# Patient Record
Sex: MALE | Race: BLACK OR AFRICAN AMERICAN | NOT HISPANIC OR LATINO | Employment: UNEMPLOYED
[De-identification: names, ages, dates, MRNs, and addresses within clinical notes are randomized per-mention and may not be internally consistent; named-entity substitution may affect disease eponyms.]

---

## 2017-05-04 ENCOUNTER — RECORDS - HEALTHEAST (OUTPATIENT)
Dept: ADMINISTRATIVE | Facility: OTHER | Age: 57
End: 2017-05-04

## 2017-12-07 ENCOUNTER — OFFICE VISIT - HEALTHEAST (OUTPATIENT)
Dept: INTERNAL MEDICINE | Facility: CLINIC | Age: 57
End: 2017-12-07

## 2017-12-07 DIAGNOSIS — F19.10 POLYSUBSTANCE ABUSE (H): ICD-10-CM

## 2017-12-07 DIAGNOSIS — M25.562 LEFT KNEE PAIN: ICD-10-CM

## 2017-12-07 DIAGNOSIS — I10 ESSENTIAL HYPERTENSION: ICD-10-CM

## 2017-12-07 DIAGNOSIS — F32.9 MAJOR DEPRESSION: ICD-10-CM

## 2017-12-07 ASSESSMENT — MIFFLIN-ST. JEOR: SCORE: 1567.43

## 2018-01-04 ENCOUNTER — COMMUNICATION - HEALTHEAST (OUTPATIENT)
Dept: INTERNAL MEDICINE | Facility: CLINIC | Age: 58
End: 2018-01-04

## 2018-01-09 ENCOUNTER — RECORDS - HEALTHEAST (OUTPATIENT)
Dept: ADMINISTRATIVE | Facility: OTHER | Age: 58
End: 2018-01-09

## 2018-01-15 ENCOUNTER — RECORDS - HEALTHEAST (OUTPATIENT)
Dept: ADMINISTRATIVE | Facility: OTHER | Age: 58
End: 2018-01-15

## 2018-01-18 ENCOUNTER — OFFICE VISIT - HEALTHEAST (OUTPATIENT)
Dept: INTERNAL MEDICINE | Facility: CLINIC | Age: 58
End: 2018-01-18

## 2018-01-18 DIAGNOSIS — I10 ESSENTIAL HYPERTENSION: ICD-10-CM

## 2018-01-18 DIAGNOSIS — M54.9 BACK PAIN: ICD-10-CM

## 2018-01-18 DIAGNOSIS — M25.562 LEFT KNEE PAIN: ICD-10-CM

## 2018-01-18 DIAGNOSIS — F32.9 MAJOR DEPRESSION: ICD-10-CM

## 2018-01-18 LAB
ALBUMIN SERPL-MCNC: 3.8 G/DL (ref 3.5–5)
ALP SERPL-CCNC: 67 U/L (ref 45–120)
ALT SERPL W P-5'-P-CCNC: 18 U/L (ref 0–45)
ANION GAP SERPL CALCULATED.3IONS-SCNC: 12 MMOL/L (ref 5–18)
AST SERPL W P-5'-P-CCNC: 24 U/L (ref 0–40)
BILIRUB SERPL-MCNC: 0.5 MG/DL (ref 0–1)
BUN SERPL-MCNC: 15 MG/DL (ref 8–22)
CALCIUM SERPL-MCNC: 9.8 MG/DL (ref 8.5–10.5)
CHLORIDE BLD-SCNC: 95 MMOL/L (ref 98–107)
CO2 SERPL-SCNC: 29 MMOL/L (ref 22–31)
CREAT SERPL-MCNC: 1.03 MG/DL (ref 0.7–1.3)
GFR SERPL CREATININE-BSD FRML MDRD: >60 ML/MIN/1.73M2
GLUCOSE BLD-MCNC: 110 MG/DL (ref 70–125)
POTASSIUM BLD-SCNC: 4 MMOL/L (ref 3.5–5)
PROT SERPL-MCNC: 7.7 G/DL (ref 6–8)
SODIUM SERPL-SCNC: 136 MMOL/L (ref 136–145)

## 2018-01-18 ASSESSMENT — MIFFLIN-ST. JEOR: SCORE: 1612.79

## 2018-01-22 ENCOUNTER — COMMUNICATION - HEALTHEAST (OUTPATIENT)
Dept: INTERNAL MEDICINE | Facility: CLINIC | Age: 58
End: 2018-01-22

## 2018-01-23 ENCOUNTER — COMMUNICATION - HEALTHEAST (OUTPATIENT)
Dept: INTERNAL MEDICINE | Facility: CLINIC | Age: 58
End: 2018-01-23

## 2018-01-29 ENCOUNTER — RECORDS - HEALTHEAST (OUTPATIENT)
Dept: ADMINISTRATIVE | Facility: OTHER | Age: 58
End: 2018-01-29

## 2018-01-30 ENCOUNTER — RECORDS - HEALTHEAST (OUTPATIENT)
Dept: ADMINISTRATIVE | Facility: OTHER | Age: 58
End: 2018-01-30

## 2018-01-30 ENCOUNTER — OFFICE VISIT - HEALTHEAST (OUTPATIENT)
Dept: BEHAVIORAL HEALTH | Facility: CLINIC | Age: 58
End: 2018-01-30

## 2018-01-30 DIAGNOSIS — F32.1 MODERATE SINGLE CURRENT EPISODE OF MAJOR DEPRESSIVE DISORDER (H): ICD-10-CM

## 2018-02-20 ENCOUNTER — COMMUNICATION - HEALTHEAST (OUTPATIENT)
Dept: INTERNAL MEDICINE | Facility: CLINIC | Age: 58
End: 2018-02-20

## 2018-02-23 ENCOUNTER — RECORDS - HEALTHEAST (OUTPATIENT)
Dept: ADMINISTRATIVE | Facility: OTHER | Age: 58
End: 2018-02-23

## 2018-03-05 ENCOUNTER — OFFICE VISIT - HEALTHEAST (OUTPATIENT)
Dept: BEHAVIORAL HEALTH | Facility: CLINIC | Age: 58
End: 2018-03-05

## 2018-03-05 ENCOUNTER — COMMUNICATION - HEALTHEAST (OUTPATIENT)
Dept: BEHAVIORAL HEALTH | Facility: CLINIC | Age: 58
End: 2018-03-05

## 2018-03-05 DIAGNOSIS — F41.1 GENERALIZED ANXIETY DISORDER: ICD-10-CM

## 2018-03-05 DIAGNOSIS — F12.21 CANNABIS DEPENDENCE IN REMISSION (H): ICD-10-CM

## 2018-03-05 DIAGNOSIS — F17.200 TOBACCO USE DISORDER: ICD-10-CM

## 2018-03-05 DIAGNOSIS — F32.1 MODERATE SINGLE CURRENT EPISODE OF MAJOR DEPRESSIVE DISORDER (H): ICD-10-CM

## 2018-03-05 DIAGNOSIS — F10.21 ALCOHOL USE DISORDER, SEVERE, IN EARLY REMISSION (H): ICD-10-CM

## 2018-03-05 DIAGNOSIS — F11.20: ICD-10-CM

## 2018-03-27 ENCOUNTER — OFFICE VISIT - HEALTHEAST (OUTPATIENT)
Dept: BEHAVIORAL HEALTH | Facility: CLINIC | Age: 58
End: 2018-03-27

## 2018-03-27 ENCOUNTER — AMBULATORY - HEALTHEAST (OUTPATIENT)
Dept: BEHAVIORAL HEALTH | Facility: CLINIC | Age: 58
End: 2018-03-27

## 2018-03-27 DIAGNOSIS — F10.21 ALCOHOL USE DISORDER, SEVERE, IN EARLY REMISSION (H): ICD-10-CM

## 2018-03-27 DIAGNOSIS — F11.20: ICD-10-CM

## 2018-03-27 DIAGNOSIS — F12.21 CANNABIS DEPENDENCE IN REMISSION (H): ICD-10-CM

## 2018-03-27 DIAGNOSIS — F41.1 GENERALIZED ANXIETY DISORDER: ICD-10-CM

## 2018-03-27 DIAGNOSIS — F32.1 MODERATE SINGLE CURRENT EPISODE OF MAJOR DEPRESSIVE DISORDER (H): ICD-10-CM

## 2018-03-27 DIAGNOSIS — F17.200 TOBACCO USE DISORDER: ICD-10-CM

## 2018-04-02 ENCOUNTER — RECORDS - HEALTHEAST (OUTPATIENT)
Dept: ADMINISTRATIVE | Facility: OTHER | Age: 58
End: 2018-04-02

## 2018-04-03 ENCOUNTER — OFFICE VISIT - HEALTHEAST (OUTPATIENT)
Dept: BEHAVIORAL HEALTH | Facility: CLINIC | Age: 58
End: 2018-04-03

## 2018-04-03 DIAGNOSIS — F17.200 TOBACCO USE DISORDER: ICD-10-CM

## 2018-04-03 DIAGNOSIS — F11.20: ICD-10-CM

## 2018-04-03 DIAGNOSIS — F32.1 MODERATE SINGLE CURRENT EPISODE OF MAJOR DEPRESSIVE DISORDER (H): ICD-10-CM

## 2018-04-03 DIAGNOSIS — F10.21 ALCOHOL USE DISORDER, SEVERE, IN EARLY REMISSION (H): ICD-10-CM

## 2018-04-03 DIAGNOSIS — F41.1 GENERALIZED ANXIETY DISORDER: ICD-10-CM

## 2018-04-06 ENCOUNTER — RECORDS - HEALTHEAST (OUTPATIENT)
Dept: ADMINISTRATIVE | Facility: OTHER | Age: 58
End: 2018-04-06

## 2018-04-08 ENCOUNTER — COMMUNICATION - HEALTHEAST (OUTPATIENT)
Dept: SCHEDULING | Facility: CLINIC | Age: 58
End: 2018-04-08

## 2018-04-16 ENCOUNTER — RECORDS - HEALTHEAST (OUTPATIENT)
Dept: ADMINISTRATIVE | Facility: OTHER | Age: 58
End: 2018-04-16

## 2018-04-17 ENCOUNTER — COMMUNICATION - HEALTHEAST (OUTPATIENT)
Dept: BEHAVIORAL HEALTH | Facility: CLINIC | Age: 58
End: 2018-04-17

## 2018-04-26 ENCOUNTER — OFFICE VISIT - HEALTHEAST (OUTPATIENT)
Dept: INTERNAL MEDICINE | Facility: CLINIC | Age: 58
End: 2018-04-26

## 2018-04-26 DIAGNOSIS — E66.01 MORBID OBESITY (H): ICD-10-CM

## 2018-04-26 DIAGNOSIS — I10 ESSENTIAL HYPERTENSION: ICD-10-CM

## 2018-04-26 DIAGNOSIS — G89.29 CHRONIC BACK PAIN: ICD-10-CM

## 2018-04-26 DIAGNOSIS — F32.9 MAJOR DEPRESSION: ICD-10-CM

## 2018-04-26 DIAGNOSIS — M54.9 CHRONIC BACK PAIN: ICD-10-CM

## 2018-04-26 ASSESSMENT — MIFFLIN-ST. JEOR: SCORE: 1701.24

## 2018-05-01 ENCOUNTER — COMMUNICATION - HEALTHEAST (OUTPATIENT)
Dept: BEHAVIORAL HEALTH | Facility: CLINIC | Age: 58
End: 2018-05-01

## 2018-05-01 ENCOUNTER — AMBULATORY - HEALTHEAST (OUTPATIENT)
Dept: BEHAVIORAL HEALTH | Facility: CLINIC | Age: 58
End: 2018-05-01

## 2018-05-07 ENCOUNTER — RECORDS - HEALTHEAST (OUTPATIENT)
Dept: ADMINISTRATIVE | Facility: OTHER | Age: 58
End: 2018-05-07

## 2018-05-08 ENCOUNTER — OFFICE VISIT - HEALTHEAST (OUTPATIENT)
Dept: BEHAVIORAL HEALTH | Facility: CLINIC | Age: 58
End: 2018-05-08

## 2018-05-08 DIAGNOSIS — F10.21 ALCOHOL USE DISORDER, SEVERE, IN EARLY REMISSION (H): ICD-10-CM

## 2018-05-08 DIAGNOSIS — F32.1 MODERATE SINGLE CURRENT EPISODE OF MAJOR DEPRESSIVE DISORDER (H): ICD-10-CM

## 2018-05-08 DIAGNOSIS — F17.200 TOBACCO USE DISORDER: ICD-10-CM

## 2018-05-08 DIAGNOSIS — F12.21 CANNABIS DEPENDENCE IN REMISSION (H): ICD-10-CM

## 2018-05-08 DIAGNOSIS — F11.20: ICD-10-CM

## 2018-05-08 DIAGNOSIS — F41.1 GENERALIZED ANXIETY DISORDER: ICD-10-CM

## 2018-05-10 ENCOUNTER — OFFICE VISIT - HEALTHEAST (OUTPATIENT)
Dept: BEHAVIORAL HEALTH | Facility: CLINIC | Age: 58
End: 2018-05-10

## 2018-05-10 ENCOUNTER — COMMUNICATION - HEALTHEAST (OUTPATIENT)
Dept: SURGERY | Facility: CLINIC | Age: 58
End: 2018-05-10

## 2018-05-10 DIAGNOSIS — F06.31 DEPRESSION DUE TO PHYSICAL ILLNESS: ICD-10-CM

## 2018-05-10 DIAGNOSIS — F32.9 REACTIVE DEPRESSION: ICD-10-CM

## 2018-05-10 ASSESSMENT — MIFFLIN-ST. JEOR: SCORE: 1660.41

## 2018-05-15 ENCOUNTER — OFFICE VISIT - HEALTHEAST (OUTPATIENT)
Dept: BEHAVIORAL HEALTH | Facility: CLINIC | Age: 58
End: 2018-05-15

## 2018-05-15 DIAGNOSIS — F10.21 ALCOHOL USE DISORDER, SEVERE, IN EARLY REMISSION (H): ICD-10-CM

## 2018-05-15 DIAGNOSIS — F11.20: ICD-10-CM

## 2018-05-15 DIAGNOSIS — F17.200 TOBACCO USE DISORDER: ICD-10-CM

## 2018-05-15 DIAGNOSIS — F12.21 CANNABIS DEPENDENCE IN REMISSION (H): ICD-10-CM

## 2018-05-15 DIAGNOSIS — F32.1 MODERATE SINGLE CURRENT EPISODE OF MAJOR DEPRESSIVE DISORDER (H): ICD-10-CM

## 2018-05-15 DIAGNOSIS — F41.1 GENERALIZED ANXIETY DISORDER: ICD-10-CM

## 2018-05-22 ENCOUNTER — OFFICE VISIT - HEALTHEAST (OUTPATIENT)
Dept: BEHAVIORAL HEALTH | Facility: CLINIC | Age: 58
End: 2018-05-22

## 2018-05-22 DIAGNOSIS — F11.20: ICD-10-CM

## 2018-05-22 DIAGNOSIS — F12.21 CANNABIS DEPENDENCE IN REMISSION (H): ICD-10-CM

## 2018-05-22 DIAGNOSIS — F32.1 MODERATE SINGLE CURRENT EPISODE OF MAJOR DEPRESSIVE DISORDER (H): ICD-10-CM

## 2018-05-22 DIAGNOSIS — F41.1 GENERALIZED ANXIETY DISORDER: ICD-10-CM

## 2018-05-22 DIAGNOSIS — F10.21 ALCOHOL USE DISORDER, SEVERE, IN EARLY REMISSION (H): ICD-10-CM

## 2018-05-22 DIAGNOSIS — F17.200 TOBACCO USE DISORDER: ICD-10-CM

## 2018-05-24 ENCOUNTER — COMMUNICATION - HEALTHEAST (OUTPATIENT)
Dept: INTERNAL MEDICINE | Facility: CLINIC | Age: 58
End: 2018-05-24

## 2018-05-24 DIAGNOSIS — M54.9 CHRONIC BACK PAIN: ICD-10-CM

## 2018-05-24 DIAGNOSIS — G89.29 CHRONIC BACK PAIN: ICD-10-CM

## 2018-05-29 ENCOUNTER — OFFICE VISIT - HEALTHEAST (OUTPATIENT)
Dept: BEHAVIORAL HEALTH | Facility: CLINIC | Age: 58
End: 2018-05-29

## 2018-05-29 DIAGNOSIS — F32.1 MODERATE SINGLE CURRENT EPISODE OF MAJOR DEPRESSIVE DISORDER (H): ICD-10-CM

## 2018-05-29 DIAGNOSIS — F41.1 GENERALIZED ANXIETY DISORDER: ICD-10-CM

## 2018-05-29 DIAGNOSIS — F17.200 TOBACCO USE DISORDER: ICD-10-CM

## 2018-06-05 ENCOUNTER — RECORDS - HEALTHEAST (OUTPATIENT)
Dept: ADMINISTRATIVE | Facility: OTHER | Age: 58
End: 2018-06-05

## 2018-06-06 ENCOUNTER — RECORDS - HEALTHEAST (OUTPATIENT)
Dept: GENERAL RADIOLOGY | Facility: CLINIC | Age: 58
End: 2018-06-06

## 2018-06-06 DIAGNOSIS — R76.11 NONSPECIFIC REACTION TO TUBERCULIN SKIN TEST WITHOUT ACTIVE TUBERCULOSIS: ICD-10-CM

## 2018-06-11 ENCOUNTER — OFFICE VISIT - HEALTHEAST (OUTPATIENT)
Dept: BEHAVIORAL HEALTH | Facility: CLINIC | Age: 58
End: 2018-06-11

## 2018-06-11 DIAGNOSIS — F17.200 TOBACCO USE DISORDER: ICD-10-CM

## 2018-06-11 DIAGNOSIS — F32.1 MODERATE SINGLE CURRENT EPISODE OF MAJOR DEPRESSIVE DISORDER (H): ICD-10-CM

## 2018-06-11 DIAGNOSIS — F41.1 GENERALIZED ANXIETY DISORDER: ICD-10-CM

## 2018-06-11 DIAGNOSIS — F11.20: ICD-10-CM

## 2018-06-21 ENCOUNTER — COMMUNICATION - HEALTHEAST (OUTPATIENT)
Dept: INTERNAL MEDICINE | Facility: CLINIC | Age: 58
End: 2018-06-21

## 2018-06-21 DIAGNOSIS — M54.9 CHRONIC BACK PAIN: ICD-10-CM

## 2018-06-21 DIAGNOSIS — G89.29 CHRONIC BACK PAIN: ICD-10-CM

## 2018-06-23 ENCOUNTER — COMMUNICATION - HEALTHEAST (OUTPATIENT)
Dept: SCHEDULING | Facility: CLINIC | Age: 58
End: 2018-06-23

## 2018-06-23 DIAGNOSIS — M54.9 CHRONIC BACK PAIN: ICD-10-CM

## 2018-06-23 DIAGNOSIS — G89.29 CHRONIC BACK PAIN: ICD-10-CM

## 2018-06-25 ENCOUNTER — COMMUNICATION - HEALTHEAST (OUTPATIENT)
Dept: SCHEDULING | Facility: CLINIC | Age: 58
End: 2018-06-25

## 2018-06-25 ENCOUNTER — OFFICE VISIT - HEALTHEAST (OUTPATIENT)
Dept: BEHAVIORAL HEALTH | Facility: CLINIC | Age: 58
End: 2018-06-25

## 2018-06-25 DIAGNOSIS — F17.200 TOBACCO USE DISORDER: ICD-10-CM

## 2018-06-25 DIAGNOSIS — M54.9 CHRONIC BACK PAIN: ICD-10-CM

## 2018-06-25 DIAGNOSIS — G89.29 CHRONIC BACK PAIN: ICD-10-CM

## 2018-06-25 DIAGNOSIS — F32.1 MODERATE SINGLE CURRENT EPISODE OF MAJOR DEPRESSIVE DISORDER (H): ICD-10-CM

## 2018-06-25 DIAGNOSIS — F41.1 GENERALIZED ANXIETY DISORDER: ICD-10-CM

## 2018-06-28 ENCOUNTER — COMMUNICATION - HEALTHEAST (OUTPATIENT)
Dept: TELEHEALTH | Facility: CLINIC | Age: 58
End: 2018-06-28

## 2018-06-28 ENCOUNTER — AMBULATORY - HEALTHEAST (OUTPATIENT)
Dept: LAB | Facility: CLINIC | Age: 58
End: 2018-06-28

## 2018-06-28 ENCOUNTER — OFFICE VISIT - HEALTHEAST (OUTPATIENT)
Dept: SURGERY | Facility: CLINIC | Age: 58
End: 2018-06-28

## 2018-06-28 DIAGNOSIS — F19.11 HX OF DRUG ABUSE (H): ICD-10-CM

## 2018-06-28 DIAGNOSIS — R60.0 PERIPHERAL EDEMA: ICD-10-CM

## 2018-06-28 DIAGNOSIS — R79.89 LOW VITAMIN D LEVEL: ICD-10-CM

## 2018-06-28 DIAGNOSIS — H04.123 DRY EYES: ICD-10-CM

## 2018-06-28 DIAGNOSIS — G47.19 EXCESSIVE DAYTIME SLEEPINESS: ICD-10-CM

## 2018-06-28 DIAGNOSIS — F32.A DEPRESSION: ICD-10-CM

## 2018-06-28 DIAGNOSIS — M19.90 ARTHRITIS: ICD-10-CM

## 2018-06-28 DIAGNOSIS — I10 HTN (HYPERTENSION): ICD-10-CM

## 2018-06-28 LAB
ALBUMIN SERPL-MCNC: 3.9 G/DL (ref 3.5–5)
ALP SERPL-CCNC: 74 U/L (ref 45–120)
ALT SERPL W P-5'-P-CCNC: 13 U/L (ref 0–45)
ANION GAP SERPL CALCULATED.3IONS-SCNC: 7 MMOL/L (ref 5–18)
AST SERPL W P-5'-P-CCNC: 23 U/L (ref 0–40)
BILIRUB SERPL-MCNC: 0.5 MG/DL (ref 0–1)
BUN SERPL-MCNC: 16 MG/DL (ref 8–22)
CALCIUM SERPL-MCNC: 9.6 MG/DL (ref 8.5–10.5)
CHLORIDE BLD-SCNC: 96 MMOL/L (ref 98–107)
CO2 SERPL-SCNC: 31 MMOL/L (ref 22–31)
CREAT SERPL-MCNC: 1.15 MG/DL (ref 0.7–1.3)
ERYTHROCYTE [DISTWIDTH] IN BLOOD BY AUTOMATED COUNT: 13.5 % (ref 11–14.5)
GFR SERPL CREATININE-BSD FRML MDRD: >60 ML/MIN/1.73M2
GLUCOSE BLD-MCNC: 89 MG/DL (ref 70–125)
HCT VFR BLD AUTO: 41.8 % (ref 40–54)
HGB BLD-MCNC: 13.4 G/DL (ref 14–18)
MAGNESIUM SERPL-MCNC: 2.2 MG/DL (ref 1.8–2.6)
MCH RBC QN AUTO: 26.3 PG (ref 27–34)
MCHC RBC AUTO-ENTMCNC: 32.1 G/DL (ref 32–36)
MCV RBC AUTO: 82 FL (ref 80–100)
PLATELET # BLD AUTO: 260 THOU/UL (ref 140–440)
PMV BLD AUTO: 9.7 FL (ref 8.5–12.5)
POTASSIUM BLD-SCNC: 4.1 MMOL/L (ref 3.5–5)
PROT SERPL-MCNC: 7.4 G/DL (ref 6–8)
RBC # BLD AUTO: 5.1 MILL/UL (ref 4.4–6.2)
SODIUM SERPL-SCNC: 134 MMOL/L (ref 136–145)
TSH SERPL DL<=0.005 MIU/L-ACNC: 2.28 UIU/ML (ref 0.3–5)
VIT B12 SERPL-MCNC: 585 PG/ML (ref 213–816)
WBC: 7.7 THOU/UL (ref 4–11)

## 2018-06-28 ASSESSMENT — MIFFLIN-ST. JEOR: SCORE: 1664.95

## 2018-06-29 ENCOUNTER — AMBULATORY - HEALTHEAST (OUTPATIENT)
Dept: SLEEP MEDICINE | Facility: CLINIC | Age: 58
End: 2018-06-29

## 2018-06-29 LAB
25(OH)D3 SERPL-MCNC: 20.4 NG/ML (ref 30–80)
HBA1C MFR BLD: 6.9 % (ref 4.2–6.1)

## 2018-07-03 LAB — VIT B1 PYROPHOSHATE BLD-SCNC: 92 NMOL/L (ref 70–180)

## 2018-07-11 ENCOUNTER — COMMUNICATION - HEALTHEAST (OUTPATIENT)
Dept: SCHEDULING | Facility: CLINIC | Age: 58
End: 2018-07-11

## 2018-07-11 DIAGNOSIS — G89.29 CHRONIC BACK PAIN: ICD-10-CM

## 2018-07-11 DIAGNOSIS — M54.9 CHRONIC BACK PAIN: ICD-10-CM

## 2018-07-13 ENCOUNTER — COMMUNICATION - HEALTHEAST (OUTPATIENT)
Dept: INTERNAL MEDICINE | Facility: CLINIC | Age: 58
End: 2018-07-13

## 2018-07-13 DIAGNOSIS — G89.29 CHRONIC BACK PAIN: ICD-10-CM

## 2018-07-13 DIAGNOSIS — M54.9 CHRONIC BACK PAIN: ICD-10-CM

## 2018-07-16 ENCOUNTER — OFFICE VISIT - HEALTHEAST (OUTPATIENT)
Dept: BEHAVIORAL HEALTH | Facility: CLINIC | Age: 58
End: 2018-07-16

## 2018-07-16 DIAGNOSIS — F32.1 MODERATE SINGLE CURRENT EPISODE OF MAJOR DEPRESSIVE DISORDER (H): ICD-10-CM

## 2018-07-17 ENCOUNTER — OFFICE VISIT - HEALTHEAST (OUTPATIENT)
Dept: BEHAVIORAL HEALTH | Facility: CLINIC | Age: 58
End: 2018-07-17

## 2018-07-17 ENCOUNTER — AMBULATORY - HEALTHEAST (OUTPATIENT)
Dept: BEHAVIORAL HEALTH | Facility: HOSPITAL | Age: 58
End: 2018-07-17

## 2018-07-17 DIAGNOSIS — F41.1 GENERALIZED ANXIETY DISORDER: ICD-10-CM

## 2018-07-17 DIAGNOSIS — F17.200 TOBACCO USE DISORDER: ICD-10-CM

## 2018-07-17 DIAGNOSIS — F32.1 MODERATE SINGLE CURRENT EPISODE OF MAJOR DEPRESSIVE DISORDER (H): ICD-10-CM

## 2018-07-18 ENCOUNTER — OFFICE VISIT - HEALTHEAST (OUTPATIENT)
Dept: INTERNAL MEDICINE | Facility: CLINIC | Age: 58
End: 2018-07-18

## 2018-07-18 DIAGNOSIS — I10 ESSENTIAL HYPERTENSION: ICD-10-CM

## 2018-07-18 DIAGNOSIS — M54.9 BACK PAIN: ICD-10-CM

## 2018-07-18 DIAGNOSIS — F32.9 MAJOR DEPRESSION: ICD-10-CM

## 2018-07-18 DIAGNOSIS — Z12.11 SCREEN FOR COLON CANCER: ICD-10-CM

## 2018-07-18 ASSESSMENT — MIFFLIN-ST. JEOR: SCORE: 1646.8

## 2018-07-24 ENCOUNTER — COMMUNICATION - HEALTHEAST (OUTPATIENT)
Dept: INTERNAL MEDICINE | Facility: CLINIC | Age: 58
End: 2018-07-24

## 2018-07-24 ENCOUNTER — COMMUNICATION - HEALTHEAST (OUTPATIENT)
Dept: SCHEDULING | Facility: CLINIC | Age: 58
End: 2018-07-24

## 2018-07-30 ENCOUNTER — COMMUNICATION - HEALTHEAST (OUTPATIENT)
Dept: INTERNAL MEDICINE | Facility: CLINIC | Age: 58
End: 2018-07-30

## 2018-07-30 ENCOUNTER — COMMUNICATION - HEALTHEAST (OUTPATIENT)
Dept: SURGERY | Facility: CLINIC | Age: 58
End: 2018-07-30

## 2018-07-30 DIAGNOSIS — R14.3 EXCESSIVE GAS: ICD-10-CM

## 2018-08-06 ENCOUNTER — OFFICE VISIT - HEALTHEAST (OUTPATIENT)
Dept: BEHAVIORAL HEALTH | Facility: CLINIC | Age: 58
End: 2018-08-06

## 2018-08-06 DIAGNOSIS — F41.1 GENERALIZED ANXIETY DISORDER: ICD-10-CM

## 2018-08-06 DIAGNOSIS — F32.1 MODERATE SINGLE CURRENT EPISODE OF MAJOR DEPRESSIVE DISORDER (H): ICD-10-CM

## 2018-08-06 DIAGNOSIS — F11.20: ICD-10-CM

## 2018-08-06 DIAGNOSIS — F17.200 TOBACCO USE DISORDER: ICD-10-CM

## 2018-08-09 ENCOUNTER — COMMUNICATION - HEALTHEAST (OUTPATIENT)
Dept: INTERNAL MEDICINE | Facility: CLINIC | Age: 58
End: 2018-08-09

## 2018-08-20 ENCOUNTER — OFFICE VISIT - HEALTHEAST (OUTPATIENT)
Dept: BEHAVIORAL HEALTH | Facility: CLINIC | Age: 58
End: 2018-08-20

## 2018-08-20 DIAGNOSIS — F11.20: ICD-10-CM

## 2018-08-20 DIAGNOSIS — F32.1 MODERATE SINGLE CURRENT EPISODE OF MAJOR DEPRESSIVE DISORDER (H): ICD-10-CM

## 2018-08-20 DIAGNOSIS — F17.200 TOBACCO USE DISORDER: ICD-10-CM

## 2018-08-20 DIAGNOSIS — F41.1 GENERALIZED ANXIETY DISORDER: ICD-10-CM

## 2018-08-23 ENCOUNTER — COMMUNICATION - HEALTHEAST (OUTPATIENT)
Dept: INTERNAL MEDICINE | Facility: CLINIC | Age: 58
End: 2018-08-23

## 2018-08-23 DIAGNOSIS — G89.29 CHRONIC BACK PAIN: ICD-10-CM

## 2018-08-23 DIAGNOSIS — M54.9 CHRONIC BACK PAIN: ICD-10-CM

## 2018-08-24 ENCOUNTER — OFFICE VISIT - HEALTHEAST (OUTPATIENT)
Dept: SURGERY | Facility: CLINIC | Age: 58
End: 2018-08-24

## 2018-08-24 DIAGNOSIS — Z71.3 NUTRITIONAL COUNSELING: ICD-10-CM

## 2018-08-24 DIAGNOSIS — M25.562 LEFT KNEE PAIN, UNSPECIFIED CHRONICITY: ICD-10-CM

## 2018-08-24 DIAGNOSIS — E66.9 OBESITY WITH BODY MASS INDEX OF 30.0-39.9: ICD-10-CM

## 2018-08-24 ASSESSMENT — MIFFLIN-ST. JEOR: SCORE: 1669.48

## 2018-08-31 ENCOUNTER — RECORDS - HEALTHEAST (OUTPATIENT)
Dept: SLEEP MEDICINE | Facility: CLINIC | Age: 58
End: 2018-08-31

## 2018-08-31 ENCOUNTER — RECORDS - HEALTHEAST (OUTPATIENT)
Dept: ADMINISTRATIVE | Facility: OTHER | Age: 58
End: 2018-08-31

## 2018-08-31 DIAGNOSIS — G47.19 OTHER HYPERSOMNIA: ICD-10-CM

## 2018-08-31 DIAGNOSIS — E66.9 OBESITY, UNSPECIFIED: ICD-10-CM

## 2018-09-04 ENCOUNTER — COMMUNICATION - HEALTHEAST (OUTPATIENT)
Dept: INTERNAL MEDICINE | Facility: CLINIC | Age: 58
End: 2018-09-04

## 2018-09-07 ENCOUNTER — OFFICE VISIT - HEALTHEAST (OUTPATIENT)
Dept: BEHAVIORAL HEALTH | Facility: HOSPITAL | Age: 58
End: 2018-09-07

## 2018-09-07 ENCOUNTER — COMMUNICATION - HEALTHEAST (OUTPATIENT)
Dept: SLEEP MEDICINE | Facility: CLINIC | Age: 58
End: 2018-09-07

## 2018-09-07 DIAGNOSIS — F11.20: ICD-10-CM

## 2018-09-07 DIAGNOSIS — F41.1 GENERALIZED ANXIETY DISORDER: ICD-10-CM

## 2018-09-07 DIAGNOSIS — F32.1 MODERATE SINGLE CURRENT EPISODE OF MAJOR DEPRESSIVE DISORDER (H): ICD-10-CM

## 2018-09-07 DIAGNOSIS — F17.200 TOBACCO USE DISORDER: ICD-10-CM

## 2018-09-10 ENCOUNTER — COMMUNICATION - HEALTHEAST (OUTPATIENT)
Dept: INTERNAL MEDICINE | Facility: CLINIC | Age: 58
End: 2018-09-10

## 2018-09-20 ENCOUNTER — AMBULATORY - HEALTHEAST (OUTPATIENT)
Dept: BEHAVIORAL HEALTH | Facility: HOSPITAL | Age: 58
End: 2018-09-20

## 2018-09-25 ENCOUNTER — OFFICE VISIT - HEALTHEAST (OUTPATIENT)
Dept: SLEEP MEDICINE | Facility: CLINIC | Age: 58
End: 2018-09-25

## 2018-09-25 DIAGNOSIS — G47.8 SLEEP DYSFUNCTION WITH SLEEP STAGE DISTURBANCE: ICD-10-CM

## 2018-09-25 DIAGNOSIS — G47.10 HYPERSOMNIA: ICD-10-CM

## 2018-09-25 DIAGNOSIS — G47.33 OBSTRUCTIVE SLEEP APNEA: ICD-10-CM

## 2018-09-25 ASSESSMENT — MIFFLIN-ST. JEOR: SCORE: 1558.35

## 2018-09-27 ENCOUNTER — AMBULATORY - HEALTHEAST (OUTPATIENT)
Dept: SLEEP MEDICINE | Facility: CLINIC | Age: 58
End: 2018-09-27

## 2018-09-27 ENCOUNTER — COMMUNICATION - HEALTHEAST (OUTPATIENT)
Dept: INTERNAL MEDICINE | Facility: CLINIC | Age: 58
End: 2018-09-27

## 2018-09-27 DIAGNOSIS — I10 ESSENTIAL HYPERTENSION: ICD-10-CM

## 2018-09-27 DIAGNOSIS — M54.9 CHRONIC BACK PAIN: ICD-10-CM

## 2018-09-27 DIAGNOSIS — G89.29 CHRONIC BACK PAIN: ICD-10-CM

## 2018-09-27 DIAGNOSIS — R14.3 EXCESSIVE GAS: ICD-10-CM

## 2018-09-28 ENCOUNTER — COMMUNICATION - HEALTHEAST (OUTPATIENT)
Dept: SURGERY | Facility: CLINIC | Age: 58
End: 2018-09-28

## 2018-10-08 ENCOUNTER — OFFICE VISIT - HEALTHEAST (OUTPATIENT)
Dept: SURGERY | Facility: CLINIC | Age: 58
End: 2018-10-08

## 2018-10-08 DIAGNOSIS — E66.9 OBESITY WITH BODY MASS INDEX OF 30.0-39.9: ICD-10-CM

## 2018-10-08 DIAGNOSIS — Z71.3 NUTRITIONAL COUNSELING: ICD-10-CM

## 2018-10-08 DIAGNOSIS — I10 ESSENTIAL HYPERTENSION: ICD-10-CM

## 2018-10-08 ASSESSMENT — MIFFLIN-ST. JEOR: SCORE: 1680.82

## 2018-10-09 ENCOUNTER — COMMUNICATION - HEALTHEAST (OUTPATIENT)
Dept: INTERNAL MEDICINE | Facility: CLINIC | Age: 58
End: 2018-10-09

## 2018-10-09 DIAGNOSIS — G89.29 CHRONIC BACK PAIN: ICD-10-CM

## 2018-10-09 DIAGNOSIS — M54.9 CHRONIC BACK PAIN: ICD-10-CM

## 2018-11-12 ENCOUNTER — OFFICE VISIT - HEALTHEAST (OUTPATIENT)
Dept: SURGERY | Facility: CLINIC | Age: 58
End: 2018-11-12

## 2018-11-12 DIAGNOSIS — E66.01 OBESITY, MORBID, BMI 40.0-49.9 (H): ICD-10-CM

## 2018-11-12 DIAGNOSIS — Z71.3 NUTRITIONAL COUNSELING: ICD-10-CM

## 2018-11-12 DIAGNOSIS — I10 ESSENTIAL HYPERTENSION: ICD-10-CM

## 2018-11-12 ASSESSMENT — MIFFLIN-ST. JEOR: SCORE: 1705.77

## 2018-11-13 ENCOUNTER — COMMUNICATION - HEALTHEAST (OUTPATIENT)
Dept: INTERNAL MEDICINE | Facility: CLINIC | Age: 58
End: 2018-11-13

## 2018-11-13 DIAGNOSIS — M54.9 CHRONIC BACK PAIN: ICD-10-CM

## 2018-11-13 DIAGNOSIS — G89.29 CHRONIC BACK PAIN: ICD-10-CM

## 2018-11-21 ENCOUNTER — OFFICE VISIT - HEALTHEAST (OUTPATIENT)
Dept: BEHAVIORAL HEALTH | Facility: CLINIC | Age: 58
End: 2018-11-21

## 2018-11-21 ENCOUNTER — COMMUNICATION - HEALTHEAST (OUTPATIENT)
Dept: INTERNAL MEDICINE | Facility: CLINIC | Age: 58
End: 2018-11-21

## 2018-11-21 ENCOUNTER — OFFICE VISIT - HEALTHEAST (OUTPATIENT)
Dept: INTERNAL MEDICINE | Facility: CLINIC | Age: 58
End: 2018-11-21

## 2018-11-21 DIAGNOSIS — F10.21 ALCOHOL USE DISORDER, SEVERE, IN EARLY REMISSION (H): ICD-10-CM

## 2018-11-21 DIAGNOSIS — I10 ESSENTIAL HYPERTENSION: ICD-10-CM

## 2018-11-21 DIAGNOSIS — M54.9 CHRONIC BACK PAIN: ICD-10-CM

## 2018-11-21 DIAGNOSIS — F32.1 CURRENT MODERATE EPISODE OF MAJOR DEPRESSIVE DISORDER WITHOUT PRIOR EPISODE (H): ICD-10-CM

## 2018-11-21 DIAGNOSIS — G89.29 CHRONIC BACK PAIN: ICD-10-CM

## 2018-11-21 ASSESSMENT — MIFFLIN-ST. JEOR: SCORE: 1696.7

## 2018-11-26 ENCOUNTER — COMMUNICATION - HEALTHEAST (OUTPATIENT)
Dept: INTERNAL MEDICINE | Facility: CLINIC | Age: 58
End: 2018-11-26

## 2018-11-27 ENCOUNTER — COMMUNICATION - HEALTHEAST (OUTPATIENT)
Dept: INTERNAL MEDICINE | Facility: CLINIC | Age: 58
End: 2018-11-27

## 2018-11-28 ENCOUNTER — HOSPITAL ENCOUNTER (OUTPATIENT)
Dept: PHYSICAL MEDICINE AND REHAB | Facility: CLINIC | Age: 58
Discharge: HOME | End: 2018-11-28
Attending: INTERNAL MEDICINE

## 2018-11-28 DIAGNOSIS — R29.898 DECONDITIONED LOW BACK: ICD-10-CM

## 2018-11-28 DIAGNOSIS — M47.816 LUMBAR FACET ARTHROPATHY: ICD-10-CM

## 2018-11-28 DIAGNOSIS — M54.50 CHRONIC BILATERAL LOW BACK PAIN WITHOUT SCIATICA: ICD-10-CM

## 2018-11-28 DIAGNOSIS — M62.9 HAMSTRING TIGHTNESS OF BOTH LOWER EXTREMITIES: ICD-10-CM

## 2018-11-28 DIAGNOSIS — M51.369 DDD (DEGENERATIVE DISC DISEASE), LUMBAR: ICD-10-CM

## 2018-11-28 DIAGNOSIS — G89.29 CHRONIC BILATERAL LOW BACK PAIN WITHOUT SCIATICA: ICD-10-CM

## 2018-11-30 ENCOUNTER — OFFICE VISIT - HEALTHEAST (OUTPATIENT)
Dept: SLEEP MEDICINE | Facility: CLINIC | Age: 58
End: 2018-11-30

## 2018-11-30 ENCOUNTER — RECORDS - HEALTHEAST (OUTPATIENT)
Dept: ADMINISTRATIVE | Facility: OTHER | Age: 58
End: 2018-11-30

## 2018-11-30 DIAGNOSIS — G47.8 SLEEP DYSFUNCTION WITH SLEEP STAGE DISTURBANCE: ICD-10-CM

## 2018-11-30 DIAGNOSIS — G47.33 OSA ON CPAP: ICD-10-CM

## 2018-11-30 ASSESSMENT — MIFFLIN-ST. JEOR: SCORE: 1678.56

## 2018-12-03 ENCOUNTER — COMMUNICATION - HEALTHEAST (OUTPATIENT)
Dept: INTERNAL MEDICINE | Facility: CLINIC | Age: 58
End: 2018-12-03

## 2018-12-12 ENCOUNTER — COMMUNICATION - HEALTHEAST (OUTPATIENT)
Dept: INTERNAL MEDICINE | Facility: CLINIC | Age: 58
End: 2018-12-12

## 2018-12-17 ENCOUNTER — OFFICE VISIT - HEALTHEAST (OUTPATIENT)
Dept: SURGERY | Facility: CLINIC | Age: 58
End: 2018-12-17

## 2018-12-17 DIAGNOSIS — I10 ESSENTIAL HYPERTENSION: ICD-10-CM

## 2018-12-17 DIAGNOSIS — Z71.3 NUTRITIONAL COUNSELING: ICD-10-CM

## 2018-12-17 DIAGNOSIS — E66.9 OBESITY WITH BODY MASS INDEX OF 30.0-39.9: ICD-10-CM

## 2018-12-17 ASSESSMENT — MIFFLIN-ST. JEOR: SCORE: 1678.56

## 2019-02-18 ENCOUNTER — COMMUNICATION - HEALTHEAST (OUTPATIENT)
Dept: SURGERY | Facility: CLINIC | Age: 59
End: 2019-02-18

## 2019-03-09 ENCOUNTER — COMMUNICATION - HEALTHEAST (OUTPATIENT)
Dept: INTERNAL MEDICINE | Facility: CLINIC | Age: 59
End: 2019-03-09

## 2019-03-18 ENCOUNTER — COMMUNICATION - HEALTHEAST (OUTPATIENT)
Dept: SURGERY | Facility: CLINIC | Age: 59
End: 2019-03-18

## 2019-03-25 ENCOUNTER — OFFICE VISIT - HEALTHEAST (OUTPATIENT)
Dept: INTERNAL MEDICINE | Facility: CLINIC | Age: 59
End: 2019-03-25

## 2019-03-25 DIAGNOSIS — K21.9 GASTROESOPHAGEAL REFLUX DISEASE WITHOUT ESOPHAGITIS: ICD-10-CM

## 2019-03-25 DIAGNOSIS — E66.01 CLASS 3 SEVERE OBESITY DUE TO EXCESS CALORIES WITHOUT SERIOUS COMORBIDITY WITH BODY MASS INDEX (BMI) OF 40.0 TO 44.9 IN ADULT (H): ICD-10-CM

## 2019-03-25 DIAGNOSIS — K59.01 SLOW TRANSIT CONSTIPATION: ICD-10-CM

## 2019-03-25 DIAGNOSIS — E66.813 CLASS 3 SEVERE OBESITY DUE TO EXCESS CALORIES WITHOUT SERIOUS COMORBIDITY WITH BODY MASS INDEX (BMI) OF 40.0 TO 44.9 IN ADULT (H): ICD-10-CM

## 2019-03-25 DIAGNOSIS — I10 ESSENTIAL HYPERTENSION: ICD-10-CM

## 2019-03-25 DIAGNOSIS — F32.1 CURRENT MODERATE EPISODE OF MAJOR DEPRESSIVE DISORDER WITHOUT PRIOR EPISODE (H): ICD-10-CM

## 2019-03-25 DIAGNOSIS — M15.0 PRIMARY OSTEOARTHRITIS INVOLVING MULTIPLE JOINTS: ICD-10-CM

## 2019-03-25 ASSESSMENT — MIFFLIN-ST. JEOR: SCORE: 1737.52

## 2019-03-27 ENCOUNTER — COMMUNICATION - HEALTHEAST (OUTPATIENT)
Dept: INTERNAL MEDICINE | Facility: CLINIC | Age: 59
End: 2019-03-27

## 2019-04-03 ENCOUNTER — OFFICE VISIT - HEALTHEAST (OUTPATIENT)
Dept: PHARMACY | Facility: CLINIC | Age: 59
End: 2019-04-03

## 2019-04-03 DIAGNOSIS — F32.9 REACTIVE DEPRESSION: ICD-10-CM

## 2019-04-03 DIAGNOSIS — M54.9 BACK PAIN, UNSPECIFIED BACK LOCATION, UNSPECIFIED BACK PAIN LATERALITY, UNSPECIFIED CHRONICITY: ICD-10-CM

## 2019-04-03 DIAGNOSIS — Z72.0 TOBACCO ABUSE: ICD-10-CM

## 2019-04-03 DIAGNOSIS — I10 ESSENTIAL HYPERTENSION: ICD-10-CM

## 2019-04-03 DIAGNOSIS — E66.9 OBESITY, UNSPECIFIED CLASSIFICATION, UNSPECIFIED OBESITY TYPE, UNSPECIFIED WHETHER SERIOUS COMORBIDITY PRESENT: ICD-10-CM

## 2019-04-03 DIAGNOSIS — G47.33 OBSTRUCTIVE SLEEP APNEA SYNDROME: ICD-10-CM

## 2019-04-11 ENCOUNTER — COMMUNICATION - HEALTHEAST (OUTPATIENT)
Dept: INTERNAL MEDICINE | Facility: CLINIC | Age: 59
End: 2019-04-11

## 2019-04-11 DIAGNOSIS — R21 RASH AND NONSPECIFIC SKIN ERUPTION: ICD-10-CM

## 2019-04-12 ENCOUNTER — COMMUNICATION - HEALTHEAST (OUTPATIENT)
Dept: NURSING | Facility: CLINIC | Age: 59
End: 2019-04-12

## 2019-04-12 ENCOUNTER — RECORDS - HEALTHEAST (OUTPATIENT)
Dept: ADMINISTRATIVE | Facility: OTHER | Age: 59
End: 2019-04-12

## 2019-04-19 ENCOUNTER — OFFICE VISIT - HEALTHEAST (OUTPATIENT)
Dept: PHARMACY | Facility: CLINIC | Age: 59
End: 2019-04-19

## 2019-04-19 DIAGNOSIS — Z72.0 TOBACCO ABUSE: ICD-10-CM

## 2019-04-19 DIAGNOSIS — F32.1 CURRENT MODERATE EPISODE OF MAJOR DEPRESSIVE DISORDER WITHOUT PRIOR EPISODE (H): ICD-10-CM

## 2019-04-26 ENCOUNTER — COMMUNICATION - HEALTHEAST (OUTPATIENT)
Dept: NURSING | Facility: CLINIC | Age: 59
End: 2019-04-26

## 2019-04-30 ENCOUNTER — OFFICE VISIT - HEALTHEAST (OUTPATIENT)
Dept: SLEEP MEDICINE | Facility: CLINIC | Age: 59
End: 2019-04-30

## 2019-04-30 DIAGNOSIS — G47.8 SLEEP DYSFUNCTION WITH SLEEP STAGE DISTURBANCE: ICD-10-CM

## 2019-04-30 DIAGNOSIS — G47.33 OBSTRUCTIVE SLEEP APNEA: ICD-10-CM

## 2019-04-30 ASSESSMENT — MIFFLIN-ST. JEOR: SCORE: 1687.63

## 2019-05-01 ENCOUNTER — AMBULATORY - HEALTHEAST (OUTPATIENT)
Dept: NURSING | Facility: CLINIC | Age: 59
End: 2019-05-01

## 2019-05-01 ENCOUNTER — OFFICE VISIT - HEALTHEAST (OUTPATIENT)
Dept: INTERNAL MEDICINE | Facility: CLINIC | Age: 59
End: 2019-05-01

## 2019-05-01 DIAGNOSIS — G89.29 CHRONIC MIDLINE LOW BACK PAIN WITHOUT SCIATICA: ICD-10-CM

## 2019-05-01 DIAGNOSIS — F32.1 CURRENT MODERATE EPISODE OF MAJOR DEPRESSIVE DISORDER WITHOUT PRIOR EPISODE (H): ICD-10-CM

## 2019-05-01 DIAGNOSIS — M54.50 CHRONIC MIDLINE LOW BACK PAIN WITHOUT SCIATICA: ICD-10-CM

## 2019-05-01 DIAGNOSIS — I10 ESSENTIAL HYPERTENSION: ICD-10-CM

## 2019-05-01 ASSESSMENT — MIFFLIN-ST. JEOR: SCORE: 1719.38

## 2019-05-03 ENCOUNTER — OFFICE VISIT - HEALTHEAST (OUTPATIENT)
Dept: SURGERY | Facility: CLINIC | Age: 59
End: 2019-05-03

## 2019-05-03 DIAGNOSIS — I10 ESSENTIAL HYPERTENSION: ICD-10-CM

## 2019-05-03 DIAGNOSIS — E66.01 OBESITY, MORBID, BMI 40.0-49.9 (H): ICD-10-CM

## 2019-05-03 DIAGNOSIS — Z71.3 NUTRITIONAL COUNSELING: ICD-10-CM

## 2019-05-03 ASSESSMENT — MIFFLIN-ST. JEOR: SCORE: 1703.5

## 2019-05-07 ENCOUNTER — COMMUNICATION - HEALTHEAST (OUTPATIENT)
Dept: NURSING | Facility: CLINIC | Age: 59
End: 2019-05-07

## 2019-05-07 ENCOUNTER — RECORDS - HEALTHEAST (OUTPATIENT)
Dept: ADMINISTRATIVE | Facility: OTHER | Age: 59
End: 2019-05-07

## 2019-05-15 ENCOUNTER — COMMUNICATION - HEALTHEAST (OUTPATIENT)
Dept: NURSING | Facility: CLINIC | Age: 59
End: 2019-05-15

## 2019-05-20 ENCOUNTER — OFFICE VISIT - HEALTHEAST (OUTPATIENT)
Dept: INTERNAL MEDICINE | Facility: CLINIC | Age: 59
End: 2019-05-20

## 2019-05-20 DIAGNOSIS — F32.1 CURRENT MODERATE EPISODE OF MAJOR DEPRESSIVE DISORDER WITHOUT PRIOR EPISODE (H): ICD-10-CM

## 2019-05-20 DIAGNOSIS — F19.10 CHEMICAL ABUSE (H): ICD-10-CM

## 2019-05-20 DIAGNOSIS — I10 ESSENTIAL HYPERTENSION: ICD-10-CM

## 2019-05-20 DIAGNOSIS — M15.0 PRIMARY OSTEOARTHRITIS INVOLVING MULTIPLE JOINTS: ICD-10-CM

## 2019-05-20 DIAGNOSIS — F10.10 ALCOHOL ABUSE: ICD-10-CM

## 2019-05-20 LAB
ALBUMIN SERPL-MCNC: 3.8 G/DL (ref 3.5–5)
ALP SERPL-CCNC: 75 U/L (ref 45–120)
ALT SERPL W P-5'-P-CCNC: 16 U/L (ref 0–45)
ANION GAP SERPL CALCULATED.3IONS-SCNC: 11 MMOL/L (ref 5–18)
AST SERPL W P-5'-P-CCNC: 22 U/L (ref 0–40)
BILIRUB DIRECT SERPL-MCNC: 0.1 MG/DL
BILIRUB SERPL-MCNC: 0.4 MG/DL (ref 0–1)
BUN SERPL-MCNC: 16 MG/DL (ref 8–22)
CALCIUM SERPL-MCNC: 9.8 MG/DL (ref 8.5–10.5)
CHLORIDE BLD-SCNC: 97 MMOL/L (ref 98–107)
CHOLEST SERPL-MCNC: 190 MG/DL
CO2 SERPL-SCNC: 27 MMOL/L (ref 22–31)
CREAT SERPL-MCNC: 1.19 MG/DL (ref 0.7–1.3)
ERYTHROCYTE [DISTWIDTH] IN BLOOD BY AUTOMATED COUNT: 12.2 % (ref 11–14.5)
FASTING STATUS PATIENT QL REPORTED: ABNORMAL
GFR SERPL CREATININE-BSD FRML MDRD: >60 ML/MIN/1.73M2
GLUCOSE BLD-MCNC: 130 MG/DL (ref 70–125)
HCT VFR BLD AUTO: 39.1 % (ref 40–54)
HDLC SERPL-MCNC: 30 MG/DL
HGB BLD-MCNC: 13.2 G/DL (ref 14–18)
LDLC SERPL CALC-MCNC: 98 MG/DL
MCH RBC QN AUTO: 26 PG (ref 27–34)
MCHC RBC AUTO-ENTMCNC: 33.7 G/DL (ref 32–36)
MCV RBC AUTO: 77 FL (ref 80–100)
PLATELET # BLD AUTO: 268 THOU/UL (ref 140–440)
PMV BLD AUTO: 7.7 FL (ref 7–10)
POTASSIUM BLD-SCNC: 4 MMOL/L (ref 3.5–5)
PROT SERPL-MCNC: 7.6 G/DL (ref 6–8)
RBC # BLD AUTO: 5.06 MILL/UL (ref 4.4–6.2)
SODIUM SERPL-SCNC: 135 MMOL/L (ref 136–145)
TRIGL SERPL-MCNC: 310 MG/DL
TSH SERPL DL<=0.005 MIU/L-ACNC: 1.75 UIU/ML (ref 0.3–5)
WBC: 7.2 THOU/UL (ref 4–11)

## 2019-05-20 ASSESSMENT — MIFFLIN-ST. JEOR: SCORE: 1719.38

## 2019-05-21 ENCOUNTER — COMMUNICATION - HEALTHEAST (OUTPATIENT)
Dept: INTERNAL MEDICINE | Facility: CLINIC | Age: 59
End: 2019-05-21

## 2019-05-21 DIAGNOSIS — E78.1 HYPERTRIGLYCERIDEMIA: ICD-10-CM

## 2019-05-22 ENCOUNTER — COMMUNICATION - HEALTHEAST (OUTPATIENT)
Dept: INTERNAL MEDICINE | Facility: CLINIC | Age: 59
End: 2019-05-22

## 2019-05-28 ENCOUNTER — COMMUNICATION - HEALTHEAST (OUTPATIENT)
Dept: BEHAVIORAL HEALTH | Facility: CLINIC | Age: 59
End: 2019-05-28

## 2019-05-28 DIAGNOSIS — F32.9 REACTIVE DEPRESSION: ICD-10-CM

## 2019-05-29 ENCOUNTER — COMMUNICATION - HEALTHEAST (OUTPATIENT)
Dept: INTERNAL MEDICINE | Facility: CLINIC | Age: 59
End: 2019-05-29

## 2019-05-29 DIAGNOSIS — M15.0 PRIMARY OSTEOARTHRITIS INVOLVING MULTIPLE JOINTS: ICD-10-CM

## 2019-06-03 ENCOUNTER — COMMUNICATION - HEALTHEAST (OUTPATIENT)
Dept: NURSING | Facility: CLINIC | Age: 59
End: 2019-06-03

## 2019-06-04 ENCOUNTER — COMMUNICATION - HEALTHEAST (OUTPATIENT)
Dept: INTERNAL MEDICINE | Facility: CLINIC | Age: 59
End: 2019-06-04

## 2019-06-04 ENCOUNTER — OFFICE VISIT - HEALTHEAST (OUTPATIENT)
Dept: INTERNAL MEDICINE | Facility: CLINIC | Age: 59
End: 2019-06-04

## 2019-06-04 ENCOUNTER — COMMUNICATION - HEALTHEAST (OUTPATIENT)
Dept: NURSING | Facility: CLINIC | Age: 59
End: 2019-06-04

## 2019-06-04 DIAGNOSIS — K14.8 TONGUE LESION: ICD-10-CM

## 2019-06-04 DIAGNOSIS — R79.89 LOW VITAMIN D LEVEL: ICD-10-CM

## 2019-06-04 ASSESSMENT — MIFFLIN-ST. JEOR: SCORE: 1710.31

## 2019-06-26 ENCOUNTER — COMMUNICATION - HEALTHEAST (OUTPATIENT)
Dept: INTERNAL MEDICINE | Facility: CLINIC | Age: 59
End: 2019-06-26

## 2019-06-26 ENCOUNTER — AMBULATORY - HEALTHEAST (OUTPATIENT)
Dept: INTERNAL MEDICINE | Facility: CLINIC | Age: 59
End: 2019-06-26

## 2019-06-26 DIAGNOSIS — F32.A DEPRESSION: ICD-10-CM

## 2019-06-28 ENCOUNTER — COMMUNICATION - HEALTHEAST (OUTPATIENT)
Dept: NURSING | Facility: CLINIC | Age: 59
End: 2019-06-28

## 2019-07-10 ENCOUNTER — COMMUNICATION - HEALTHEAST (OUTPATIENT)
Dept: INTERNAL MEDICINE | Facility: CLINIC | Age: 59
End: 2019-07-10

## 2019-07-10 DIAGNOSIS — E78.1 HYPERTRIGLYCERIDEMIA: ICD-10-CM

## 2019-07-12 ENCOUNTER — COMMUNICATION - HEALTHEAST (OUTPATIENT)
Dept: NURSING | Facility: CLINIC | Age: 59
End: 2019-07-12

## 2019-07-22 ENCOUNTER — OFFICE VISIT - HEALTHEAST (OUTPATIENT)
Dept: SURGERY | Facility: CLINIC | Age: 59
End: 2019-07-22

## 2019-07-22 DIAGNOSIS — E66.01 OBESITY, CLASS III, BMI 40-49.9 (MORBID OBESITY) (H): ICD-10-CM

## 2019-07-22 DIAGNOSIS — Z71.3 NUTRITIONAL COUNSELING: ICD-10-CM

## 2019-07-22 DIAGNOSIS — F17.200 NICOTINE USE DISORDER: ICD-10-CM

## 2019-07-22 DIAGNOSIS — R79.89 LOW VITAMIN D LEVEL: ICD-10-CM

## 2019-07-22 DIAGNOSIS — R73.03 BORDERLINE DIABETIC: ICD-10-CM

## 2019-07-22 DIAGNOSIS — I10 ESSENTIAL HYPERTENSION: ICD-10-CM

## 2019-07-22 ASSESSMENT — MIFFLIN-ST. JEOR
SCORE: 1701.24
SCORE: 1701.24

## 2019-07-23 ENCOUNTER — AMBULATORY - HEALTHEAST (OUTPATIENT)
Dept: LAB | Facility: CLINIC | Age: 59
End: 2019-07-23

## 2019-07-23 DIAGNOSIS — E66.01 OBESITY, CLASS III, BMI 40-49.9 (MORBID OBESITY) (H): ICD-10-CM

## 2019-07-23 DIAGNOSIS — R79.89 LOW VITAMIN D LEVEL: ICD-10-CM

## 2019-07-23 DIAGNOSIS — R73.03 BORDERLINE DIABETIC: ICD-10-CM

## 2019-07-23 LAB
HBA1C MFR BLD: 8.2 % (ref 3.5–6)
TSH SERPL DL<=0.005 MIU/L-ACNC: 1.19 UIU/ML (ref 0.3–5)
VIT B12 SERPL-MCNC: 648 PG/ML (ref 213–816)

## 2019-07-24 ENCOUNTER — COMMUNICATION - HEALTHEAST (OUTPATIENT)
Dept: INTERNAL MEDICINE | Facility: CLINIC | Age: 59
End: 2019-07-24

## 2019-07-24 DIAGNOSIS — I10 ESSENTIAL HYPERTENSION: ICD-10-CM

## 2019-07-24 DIAGNOSIS — M15.0 PRIMARY OSTEOARTHRITIS INVOLVING MULTIPLE JOINTS: ICD-10-CM

## 2019-07-24 DIAGNOSIS — L30.9 DERMATITIS: ICD-10-CM

## 2019-07-24 LAB — 25(OH)D3 SERPL-MCNC: 40.3 NG/ML (ref 30–80)

## 2019-07-25 ENCOUNTER — COMMUNICATION - HEALTHEAST (OUTPATIENT)
Dept: SCHEDULING | Facility: CLINIC | Age: 59
End: 2019-07-25

## 2019-07-25 ENCOUNTER — COMMUNICATION - HEALTHEAST (OUTPATIENT)
Dept: SURGERY | Facility: CLINIC | Age: 59
End: 2019-07-25

## 2019-07-25 DIAGNOSIS — H04.123 DRY EYES: ICD-10-CM

## 2019-07-26 ENCOUNTER — COMMUNICATION - HEALTHEAST (OUTPATIENT)
Dept: NURSING | Facility: CLINIC | Age: 59
End: 2019-07-26

## 2019-07-31 ENCOUNTER — AMBULATORY - HEALTHEAST (OUTPATIENT)
Dept: NURSING | Facility: CLINIC | Age: 59
End: 2019-07-31

## 2019-08-05 ENCOUNTER — AMBULATORY - HEALTHEAST (OUTPATIENT)
Dept: INTERNAL MEDICINE | Facility: CLINIC | Age: 59
End: 2019-08-05

## 2019-08-05 ENCOUNTER — COMMUNICATION - HEALTHEAST (OUTPATIENT)
Dept: INTERNAL MEDICINE | Facility: CLINIC | Age: 59
End: 2019-08-05

## 2019-08-05 DIAGNOSIS — Z79.899 MEDICATION MANAGEMENT: ICD-10-CM

## 2019-08-07 ENCOUNTER — COMMUNICATION - HEALTHEAST (OUTPATIENT)
Dept: NURSING | Facility: CLINIC | Age: 59
End: 2019-08-07

## 2019-08-09 ENCOUNTER — COMMUNICATION - HEALTHEAST (OUTPATIENT)
Dept: INTERNAL MEDICINE | Facility: CLINIC | Age: 59
End: 2019-08-09

## 2019-08-09 DIAGNOSIS — R14.3 EXCESSIVE GAS: ICD-10-CM

## 2019-08-09 DIAGNOSIS — M54.9 BACK PAIN: ICD-10-CM

## 2019-08-14 ENCOUNTER — COMMUNICATION - HEALTHEAST (OUTPATIENT)
Dept: NURSING | Facility: CLINIC | Age: 59
End: 2019-08-14

## 2019-08-27 ENCOUNTER — COMMUNICATION - HEALTHEAST (OUTPATIENT)
Dept: ADMINISTRATIVE | Facility: CLINIC | Age: 59
End: 2019-08-27

## 2019-08-27 ENCOUNTER — AMBULATORY - HEALTHEAST (OUTPATIENT)
Dept: INTERNAL MEDICINE | Facility: CLINIC | Age: 59
End: 2019-08-27

## 2019-08-27 ENCOUNTER — COMMUNICATION - HEALTHEAST (OUTPATIENT)
Dept: EDUCATION SERVICES | Facility: CLINIC | Age: 59
End: 2019-08-27

## 2019-08-27 DIAGNOSIS — R73.03 PRE-DIABETES: ICD-10-CM

## 2019-08-27 DIAGNOSIS — E11.9 DM2 (DIABETES MELLITUS, TYPE 2) (H): ICD-10-CM

## 2019-09-06 ENCOUNTER — COMMUNICATION - HEALTHEAST (OUTPATIENT)
Dept: NURSING | Facility: CLINIC | Age: 59
End: 2019-09-06

## 2019-09-17 ENCOUNTER — COMMUNICATION - HEALTHEAST (OUTPATIENT)
Dept: SCHEDULING | Facility: CLINIC | Age: 59
End: 2019-09-17

## 2019-09-18 ENCOUNTER — COMMUNICATION - HEALTHEAST (OUTPATIENT)
Dept: INTERNAL MEDICINE | Facility: CLINIC | Age: 59
End: 2019-09-18

## 2019-09-18 ENCOUNTER — OFFICE VISIT - HEALTHEAST (OUTPATIENT)
Dept: INTERNAL MEDICINE | Facility: CLINIC | Age: 59
End: 2019-09-18

## 2019-09-18 ENCOUNTER — AMBULATORY - HEALTHEAST (OUTPATIENT)
Dept: NURSING | Facility: CLINIC | Age: 59
End: 2019-09-18

## 2019-09-18 DIAGNOSIS — I10 ESSENTIAL HYPERTENSION: ICD-10-CM

## 2019-09-18 DIAGNOSIS — E66.01 MORBID OBESITY (H): ICD-10-CM

## 2019-09-18 DIAGNOSIS — E78.2 MIXED HYPERLIPIDEMIA: ICD-10-CM

## 2019-09-18 DIAGNOSIS — Z23 NEED FOR IMMUNIZATION AGAINST INFLUENZA: ICD-10-CM

## 2019-09-18 DIAGNOSIS — E11.9 TYPE 2 DIABETES MELLITUS WITHOUT COMPLICATION, WITHOUT LONG-TERM CURRENT USE OF INSULIN (H): ICD-10-CM

## 2019-09-18 DIAGNOSIS — G47.33 OSA (OBSTRUCTIVE SLEEP APNEA): ICD-10-CM

## 2019-09-18 DIAGNOSIS — F32.1 CURRENT MODERATE EPISODE OF MAJOR DEPRESSIVE DISORDER WITHOUT PRIOR EPISODE (H): ICD-10-CM

## 2019-09-18 DIAGNOSIS — Z00.00 ROUTINE GENERAL MEDICAL EXAMINATION AT A HEALTH CARE FACILITY: ICD-10-CM

## 2019-09-18 DIAGNOSIS — B35.1 ONYCHOMYCOSIS OF TOENAIL: ICD-10-CM

## 2019-09-18 DIAGNOSIS — Z12.11 SCREEN FOR COLON CANCER: ICD-10-CM

## 2019-09-18 ASSESSMENT — MIFFLIN-ST. JEOR: SCORE: 1657.01

## 2019-09-19 ENCOUNTER — COMMUNICATION - HEALTHEAST (OUTPATIENT)
Dept: CARE COORDINATION | Facility: CLINIC | Age: 59
End: 2019-09-19

## 2019-09-19 ASSESSMENT — PATIENT HEALTH QUESTIONNAIRE - PHQ9: SUM OF ALL RESPONSES TO PHQ QUESTIONS 1-9: 11

## 2019-09-20 ENCOUNTER — COMMUNICATION - HEALTHEAST (OUTPATIENT)
Dept: INTERNAL MEDICINE | Facility: CLINIC | Age: 59
End: 2019-09-20

## 2019-09-23 ENCOUNTER — OFFICE VISIT - HEALTHEAST (OUTPATIENT)
Dept: EDUCATION SERVICES | Facility: CLINIC | Age: 59
End: 2019-09-23

## 2019-09-27 ENCOUNTER — AMBULATORY - HEALTHEAST (OUTPATIENT)
Dept: LAB | Facility: CLINIC | Age: 59
End: 2019-09-27

## 2019-09-27 DIAGNOSIS — E11.9 TYPE 2 DIABETES MELLITUS WITHOUT COMPLICATION, WITHOUT LONG-TERM CURRENT USE OF INSULIN (H): ICD-10-CM

## 2019-09-27 DIAGNOSIS — I10 ESSENTIAL HYPERTENSION: ICD-10-CM

## 2019-09-27 DIAGNOSIS — Z00.00 ROUTINE GENERAL MEDICAL EXAMINATION AT A HEALTH CARE FACILITY: ICD-10-CM

## 2019-09-27 DIAGNOSIS — E78.2 MIXED HYPERLIPIDEMIA: ICD-10-CM

## 2019-09-27 LAB
ALBUMIN SERPL-MCNC: 4.3 G/DL (ref 3.5–5)
ALBUMIN UR-MCNC: NEGATIVE MG/DL
ALP SERPL-CCNC: 79 U/L (ref 45–120)
ALT SERPL W P-5'-P-CCNC: 23 U/L (ref 0–45)
ANION GAP SERPL CALCULATED.3IONS-SCNC: 10 MMOL/L (ref 5–18)
APPEARANCE UR: CLEAR
AST SERPL W P-5'-P-CCNC: 30 U/L (ref 0–40)
BILIRUB DIRECT SERPL-MCNC: 0.2 MG/DL
BILIRUB SERPL-MCNC: 0.6 MG/DL (ref 0–1)
BILIRUB UR QL STRIP: NEGATIVE
BUN SERPL-MCNC: 17 MG/DL (ref 8–22)
CALCIUM SERPL-MCNC: 9.6 MG/DL (ref 8.5–10.5)
CHLORIDE BLD-SCNC: 95 MMOL/L (ref 98–107)
CO2 SERPL-SCNC: 27 MMOL/L (ref 22–31)
COLOR UR AUTO: YELLOW
CREAT SERPL-MCNC: 1.52 MG/DL (ref 0.7–1.3)
CREAT UR-MCNC: 109.9 MG/DL
ERYTHROCYTE [DISTWIDTH] IN BLOOD BY AUTOMATED COUNT: 12.5 % (ref 11–14.5)
GFR SERPL CREATININE-BSD FRML MDRD: 47 ML/MIN/1.73M2
GLUCOSE BLD-MCNC: 85 MG/DL (ref 70–125)
GLUCOSE UR STRIP-MCNC: NEGATIVE MG/DL
HBA1C MFR BLD: 7.8 % (ref 3.5–6)
HCT VFR BLD AUTO: 42.8 % (ref 40–54)
HGB BLD-MCNC: 14.1 G/DL (ref 14–18)
HGB UR QL STRIP: NEGATIVE
KETONES UR STRIP-MCNC: NEGATIVE MG/DL
LEUKOCYTE ESTERASE UR QL STRIP: NEGATIVE
MCH RBC QN AUTO: 25.9 PG (ref 27–34)
MCHC RBC AUTO-ENTMCNC: 33 G/DL (ref 32–36)
MCV RBC AUTO: 78 FL (ref 80–100)
MICROALBUMIN UR-MCNC: 1.03 MG/DL (ref 0–1.99)
MICROALBUMIN/CREAT UR: 9.4 MG/G
NITRATE UR QL: NEGATIVE
PH UR STRIP: 6.5 [PH] (ref 5–8)
PLATELET # BLD AUTO: 317 THOU/UL (ref 140–440)
PMV BLD AUTO: 7.6 FL (ref 7–10)
POTASSIUM BLD-SCNC: 4.1 MMOL/L (ref 3.5–5)
PROT SERPL-MCNC: 7.8 G/DL (ref 6–8)
PSA SERPL-MCNC: 1.3 NG/ML (ref 0–3.5)
RBC # BLD AUTO: 5.45 MILL/UL (ref 4.4–6.2)
SODIUM SERPL-SCNC: 132 MMOL/L (ref 136–145)
SP GR UR STRIP: 1.02 (ref 1–1.03)
TSH SERPL DL<=0.005 MIU/L-ACNC: 1.86 UIU/ML (ref 0.3–5)
UROBILINOGEN UR STRIP-ACNC: NORMAL
WBC: 6.4 THOU/UL (ref 4–11)

## 2019-09-30 ENCOUNTER — COMMUNICATION - HEALTHEAST (OUTPATIENT)
Dept: INTERNAL MEDICINE | Facility: CLINIC | Age: 59
End: 2019-09-30

## 2019-10-02 ENCOUNTER — COMMUNICATION - HEALTHEAST (OUTPATIENT)
Dept: NURSING | Facility: CLINIC | Age: 59
End: 2019-10-02

## 2019-10-09 ENCOUNTER — COMMUNICATION - HEALTHEAST (OUTPATIENT)
Dept: CARE COORDINATION | Facility: CLINIC | Age: 59
End: 2019-10-09

## 2019-10-10 ENCOUNTER — COMMUNICATION - HEALTHEAST (OUTPATIENT)
Dept: CARE COORDINATION | Facility: CLINIC | Age: 59
End: 2019-10-10

## 2019-10-22 ENCOUNTER — COMMUNICATION - HEALTHEAST (OUTPATIENT)
Dept: NURSING | Facility: CLINIC | Age: 59
End: 2019-10-22

## 2019-10-30 ENCOUNTER — OFFICE VISIT - HEALTHEAST (OUTPATIENT)
Dept: INTERNAL MEDICINE | Facility: CLINIC | Age: 59
End: 2019-10-30

## 2019-10-30 DIAGNOSIS — E78.2 MIXED HYPERLIPIDEMIA: ICD-10-CM

## 2019-10-30 DIAGNOSIS — E11.9 TYPE 2 DIABETES MELLITUS WITHOUT COMPLICATION, WITHOUT LONG-TERM CURRENT USE OF INSULIN (H): ICD-10-CM

## 2019-10-30 DIAGNOSIS — Z12.11 SPECIAL SCREENING FOR MALIGNANT NEOPLASMS, COLON: ICD-10-CM

## 2019-10-30 DIAGNOSIS — F32.1 CURRENT MODERATE EPISODE OF MAJOR DEPRESSIVE DISORDER WITHOUT PRIOR EPISODE (H): ICD-10-CM

## 2019-10-30 DIAGNOSIS — G47.33 OBSTRUCTIVE SLEEP APNEA SYNDROME: ICD-10-CM

## 2019-10-30 DIAGNOSIS — F19.10 CHEMICAL ABUSE (H): ICD-10-CM

## 2019-10-30 DIAGNOSIS — E66.01 MORBID OBESITY (H): ICD-10-CM

## 2019-10-30 DIAGNOSIS — I10 ESSENTIAL HYPERTENSION: ICD-10-CM

## 2019-10-30 DIAGNOSIS — G89.29 CHRONIC PAIN OF LEFT KNEE: ICD-10-CM

## 2019-10-30 DIAGNOSIS — M25.562 CHRONIC PAIN OF LEFT KNEE: ICD-10-CM

## 2019-10-30 ASSESSMENT — MIFFLIN-ST. JEOR: SCORE: 1588.97

## 2019-11-06 ENCOUNTER — COMMUNICATION - HEALTHEAST (OUTPATIENT)
Dept: NURSING | Facility: CLINIC | Age: 59
End: 2019-11-06

## 2019-11-13 ENCOUNTER — COMMUNICATION - HEALTHEAST (OUTPATIENT)
Dept: CARE COORDINATION | Facility: CLINIC | Age: 59
End: 2019-11-13

## 2019-11-20 ENCOUNTER — COMMUNICATION - HEALTHEAST (OUTPATIENT)
Dept: NURSING | Facility: CLINIC | Age: 59
End: 2019-11-20

## 2019-12-05 ENCOUNTER — COMMUNICATION - HEALTHEAST (OUTPATIENT)
Dept: NURSING | Facility: CLINIC | Age: 59
End: 2019-12-05

## 2019-12-07 ENCOUNTER — COMMUNICATION - HEALTHEAST (OUTPATIENT)
Dept: INTERNAL MEDICINE | Facility: CLINIC | Age: 59
End: 2019-12-07

## 2019-12-07 DIAGNOSIS — I10 ESSENTIAL HYPERTENSION: ICD-10-CM

## 2019-12-12 ENCOUNTER — RECORDS - HEALTHEAST (OUTPATIENT)
Dept: ADMINISTRATIVE | Facility: OTHER | Age: 59
End: 2019-12-12

## 2020-01-02 ENCOUNTER — COMMUNICATION - HEALTHEAST (OUTPATIENT)
Dept: NURSING | Facility: CLINIC | Age: 60
End: 2020-01-02

## 2020-01-02 ENCOUNTER — COMMUNICATION - HEALTHEAST (OUTPATIENT)
Dept: CARE COORDINATION | Facility: CLINIC | Age: 60
End: 2020-01-02

## 2020-01-13 ENCOUNTER — COMMUNICATION - HEALTHEAST (OUTPATIENT)
Dept: NURSING | Facility: CLINIC | Age: 60
End: 2020-01-13

## 2020-01-16 ENCOUNTER — COMMUNICATION - HEALTHEAST (OUTPATIENT)
Dept: CARE COORDINATION | Facility: CLINIC | Age: 60
End: 2020-01-16

## 2020-02-06 ENCOUNTER — COMMUNICATION - HEALTHEAST (OUTPATIENT)
Dept: INTERNAL MEDICINE | Facility: CLINIC | Age: 60
End: 2020-02-06

## 2020-02-07 ENCOUNTER — COMMUNICATION - HEALTHEAST (OUTPATIENT)
Dept: NURSING | Facility: CLINIC | Age: 60
End: 2020-02-07

## 2020-02-12 ENCOUNTER — OFFICE VISIT - HEALTHEAST (OUTPATIENT)
Dept: INTERNAL MEDICINE | Facility: CLINIC | Age: 60
End: 2020-02-12

## 2020-02-12 ENCOUNTER — AMBULATORY - HEALTHEAST (OUTPATIENT)
Dept: NURSING | Facility: CLINIC | Age: 60
End: 2020-02-12

## 2020-02-12 DIAGNOSIS — F19.10 CHEMICAL ABUSE (H): ICD-10-CM

## 2020-02-12 DIAGNOSIS — F32.1 CURRENT MODERATE EPISODE OF MAJOR DEPRESSIVE DISORDER WITHOUT PRIOR EPISODE (H): ICD-10-CM

## 2020-02-12 DIAGNOSIS — E78.2 MIXED HYPERLIPIDEMIA: ICD-10-CM

## 2020-02-12 DIAGNOSIS — G89.29 CHRONIC PAIN OF LEFT KNEE: ICD-10-CM

## 2020-02-12 DIAGNOSIS — I10 ESSENTIAL HYPERTENSION: ICD-10-CM

## 2020-02-12 DIAGNOSIS — E11.9 TYPE 2 DIABETES MELLITUS WITHOUT COMPLICATION, WITHOUT LONG-TERM CURRENT USE OF INSULIN (H): ICD-10-CM

## 2020-02-12 DIAGNOSIS — E66.01 MORBID OBESITY (H): ICD-10-CM

## 2020-02-12 DIAGNOSIS — M25.562 CHRONIC PAIN OF LEFT KNEE: ICD-10-CM

## 2020-02-12 ASSESSMENT — PATIENT HEALTH QUESTIONNAIRE - PHQ9: SUM OF ALL RESPONSES TO PHQ QUESTIONS 1-9: 11

## 2020-02-12 ASSESSMENT — MIFFLIN-ST. JEOR: SCORE: 1517.53

## 2020-02-21 ENCOUNTER — COMMUNICATION - HEALTHEAST (OUTPATIENT)
Dept: NURSING | Facility: CLINIC | Age: 60
End: 2020-02-21

## 2020-03-23 ENCOUNTER — COMMUNICATION - HEALTHEAST (OUTPATIENT)
Dept: NURSING | Facility: CLINIC | Age: 60
End: 2020-03-23

## 2020-04-01 ENCOUNTER — COMMUNICATION - HEALTHEAST (OUTPATIENT)
Dept: SCHEDULING | Facility: CLINIC | Age: 60
End: 2020-04-01

## 2020-04-08 ENCOUNTER — COMMUNICATION - HEALTHEAST (OUTPATIENT)
Dept: SCHEDULING | Facility: CLINIC | Age: 60
End: 2020-04-08

## 2020-04-08 ASSESSMENT — ACTIVITIES OF DAILY LIVING (ADL): DEPENDENT_IADLS:: CLEANING;COOKING

## 2020-04-09 ENCOUNTER — COMMUNICATION - HEALTHEAST (OUTPATIENT)
Dept: NURSING | Facility: CLINIC | Age: 60
End: 2020-04-09

## 2020-04-30 ENCOUNTER — COMMUNICATION - HEALTHEAST (OUTPATIENT)
Dept: NURSING | Facility: CLINIC | Age: 60
End: 2020-04-30

## 2020-05-08 ENCOUNTER — COMMUNICATION - HEALTHEAST (OUTPATIENT)
Dept: NURSING | Facility: CLINIC | Age: 60
End: 2020-05-08

## 2020-05-12 ENCOUNTER — COMMUNICATION - HEALTHEAST (OUTPATIENT)
Dept: NURSING | Facility: CLINIC | Age: 60
End: 2020-05-12

## 2020-05-13 ENCOUNTER — COMMUNICATION - HEALTHEAST (OUTPATIENT)
Dept: CARE COORDINATION | Facility: CLINIC | Age: 60
End: 2020-05-13

## 2020-05-14 ENCOUNTER — RECORDS - HEALTHEAST (OUTPATIENT)
Dept: ADMINISTRATIVE | Facility: OTHER | Age: 60
End: 2020-05-14

## 2020-05-18 ENCOUNTER — OFFICE VISIT - HEALTHEAST (OUTPATIENT)
Dept: INTERNAL MEDICINE | Facility: CLINIC | Age: 60
End: 2020-05-18

## 2020-05-18 DIAGNOSIS — G47.33 OBSTRUCTIVE SLEEP APNEA SYNDROME: ICD-10-CM

## 2020-05-18 DIAGNOSIS — G89.29 CHRONIC PAIN OF LEFT KNEE: ICD-10-CM

## 2020-05-18 DIAGNOSIS — I10 ESSENTIAL HYPERTENSION: ICD-10-CM

## 2020-05-18 DIAGNOSIS — E66.01 MORBID OBESITY (H): ICD-10-CM

## 2020-05-18 DIAGNOSIS — M25.562 CHRONIC PAIN OF LEFT KNEE: ICD-10-CM

## 2020-05-18 DIAGNOSIS — E11.9 TYPE 2 DIABETES MELLITUS WITHOUT COMPLICATION, WITHOUT LONG-TERM CURRENT USE OF INSULIN (H): ICD-10-CM

## 2020-05-18 DIAGNOSIS — E78.2 MIXED HYPERLIPIDEMIA: ICD-10-CM

## 2020-05-18 DIAGNOSIS — F19.10 CHEMICAL ABUSE (H): ICD-10-CM

## 2020-05-19 ENCOUNTER — COMMUNICATION - HEALTHEAST (OUTPATIENT)
Dept: INTERNAL MEDICINE | Facility: CLINIC | Age: 60
End: 2020-05-19

## 2020-05-19 DIAGNOSIS — I10 ESSENTIAL HYPERTENSION: ICD-10-CM

## 2020-05-27 ENCOUNTER — COMMUNICATION - HEALTHEAST (OUTPATIENT)
Dept: SURGERY | Facility: CLINIC | Age: 60
End: 2020-05-27

## 2020-05-27 DIAGNOSIS — E11.9 TYPE 2 DIABETES MELLITUS WITHOUT COMPLICATION, WITHOUT LONG-TERM CURRENT USE OF INSULIN (H): ICD-10-CM

## 2020-06-10 ENCOUNTER — COMMUNICATION - HEALTHEAST (OUTPATIENT)
Dept: NURSING | Facility: CLINIC | Age: 60
End: 2020-06-10

## 2020-06-10 ENCOUNTER — COMMUNICATION - HEALTHEAST (OUTPATIENT)
Dept: INTERNAL MEDICINE | Facility: CLINIC | Age: 60
End: 2020-06-10

## 2020-06-10 DIAGNOSIS — K64.9 HEMORRHOIDS, UNSPECIFIED HEMORRHOID TYPE: ICD-10-CM

## 2020-06-15 ENCOUNTER — COMMUNICATION - HEALTHEAST (OUTPATIENT)
Dept: INTERNAL MEDICINE | Facility: CLINIC | Age: 60
End: 2020-06-15

## 2020-06-15 DIAGNOSIS — K64.9 HEMORRHOIDS, UNSPECIFIED HEMORRHOID TYPE: ICD-10-CM

## 2020-06-16 ENCOUNTER — COMMUNICATION - HEALTHEAST (OUTPATIENT)
Dept: NURSING | Facility: CLINIC | Age: 60
End: 2020-06-16

## 2020-06-16 ENCOUNTER — COMMUNICATION - HEALTHEAST (OUTPATIENT)
Dept: INTERNAL MEDICINE | Facility: CLINIC | Age: 60
End: 2020-06-16

## 2020-06-24 ENCOUNTER — AMBULATORY - HEALTHEAST (OUTPATIENT)
Dept: LAB | Facility: CLINIC | Age: 60
End: 2020-06-24

## 2020-06-24 DIAGNOSIS — E11.9 TYPE 2 DIABETES MELLITUS WITHOUT COMPLICATION, WITHOUT LONG-TERM CURRENT USE OF INSULIN (H): ICD-10-CM

## 2020-06-24 DIAGNOSIS — E78.2 MIXED HYPERLIPIDEMIA: ICD-10-CM

## 2020-06-24 DIAGNOSIS — I10 ESSENTIAL HYPERTENSION: ICD-10-CM

## 2020-06-24 LAB
ALBUMIN SERPL-MCNC: 4 G/DL (ref 3.5–5)
ALP SERPL-CCNC: 67 U/L (ref 45–120)
ALT SERPL W P-5'-P-CCNC: 14 U/L (ref 0–45)
ANION GAP SERPL CALCULATED.3IONS-SCNC: 12 MMOL/L (ref 5–18)
AST SERPL W P-5'-P-CCNC: 21 U/L (ref 0–40)
BILIRUB DIRECT SERPL-MCNC: 0.1 MG/DL
BILIRUB SERPL-MCNC: 0.3 MG/DL (ref 0–1)
BUN SERPL-MCNC: 18 MG/DL (ref 8–22)
CALCIUM SERPL-MCNC: 9.4 MG/DL (ref 8.5–10.5)
CHLORIDE BLD-SCNC: 94 MMOL/L (ref 98–107)
CHOLEST SERPL-MCNC: 201 MG/DL
CO2 SERPL-SCNC: 30 MMOL/L (ref 22–31)
CREAT SERPL-MCNC: 1.16 MG/DL (ref 0.7–1.3)
ERYTHROCYTE [DISTWIDTH] IN BLOOD BY AUTOMATED COUNT: 11.9 % (ref 11–14.5)
FASTING STATUS PATIENT QL REPORTED: YES
GFR SERPL CREATININE-BSD FRML MDRD: >60 ML/MIN/1.73M2
GLUCOSE BLD-MCNC: 90 MG/DL (ref 70–125)
HBA1C MFR BLD: 6.1 % (ref 3.5–6)
HCT VFR BLD AUTO: 41.8 % (ref 40–54)
HDLC SERPL-MCNC: 61 MG/DL
HGB BLD-MCNC: 14 G/DL (ref 14–18)
LDLC SERPL CALC-MCNC: 115 MG/DL
MCH RBC QN AUTO: 26.5 PG (ref 27–34)
MCHC RBC AUTO-ENTMCNC: 33.5 G/DL (ref 32–36)
MCV RBC AUTO: 79 FL (ref 80–100)
PLATELET # BLD AUTO: 297 THOU/UL (ref 140–440)
PMV BLD AUTO: 7.2 FL (ref 7–10)
POTASSIUM BLD-SCNC: 3.8 MMOL/L (ref 3.5–5)
PROT SERPL-MCNC: 7.4 G/DL (ref 6–8)
RBC # BLD AUTO: 5.3 MILL/UL (ref 4.4–6.2)
SODIUM SERPL-SCNC: 136 MMOL/L (ref 136–145)
TRIGL SERPL-MCNC: 123 MG/DL
WBC: 6.2 THOU/UL (ref 4–11)

## 2020-06-26 ENCOUNTER — COMMUNICATION - HEALTHEAST (OUTPATIENT)
Dept: INTERNAL MEDICINE | Facility: CLINIC | Age: 60
End: 2020-06-26

## 2020-06-29 ENCOUNTER — COMMUNICATION - HEALTHEAST (OUTPATIENT)
Dept: INTERNAL MEDICINE | Facility: CLINIC | Age: 60
End: 2020-06-29

## 2020-07-03 ENCOUNTER — COMMUNICATION - HEALTHEAST (OUTPATIENT)
Dept: NURSING | Facility: CLINIC | Age: 60
End: 2020-07-03

## 2020-07-15 ENCOUNTER — COMMUNICATION - HEALTHEAST (OUTPATIENT)
Dept: NURSING | Facility: CLINIC | Age: 60
End: 2020-07-15

## 2020-07-30 ENCOUNTER — COMMUNICATION - HEALTHEAST (OUTPATIENT)
Dept: INTERNAL MEDICINE | Facility: CLINIC | Age: 60
End: 2020-07-30

## 2020-07-30 DIAGNOSIS — R79.89 LOW VITAMIN D LEVEL: ICD-10-CM

## 2020-07-31 ENCOUNTER — COMMUNICATION - HEALTHEAST (OUTPATIENT)
Dept: CARE COORDINATION | Facility: CLINIC | Age: 60
End: 2020-07-31

## 2020-08-19 ENCOUNTER — COMMUNICATION - HEALTHEAST (OUTPATIENT)
Dept: NURSING | Facility: CLINIC | Age: 60
End: 2020-08-19

## 2020-08-20 ENCOUNTER — COMMUNICATION - HEALTHEAST (OUTPATIENT)
Dept: CARE COORDINATION | Facility: CLINIC | Age: 60
End: 2020-08-20

## 2020-08-27 ENCOUNTER — OFFICE VISIT - HEALTHEAST (OUTPATIENT)
Dept: ADDICTION MEDICINE | Facility: CLINIC | Age: 60
End: 2020-08-27

## 2020-08-27 DIAGNOSIS — Z03.89 NO DIAGNOSIS ON AXIS I: ICD-10-CM

## 2020-09-04 ENCOUNTER — COMMUNICATION - HEALTHEAST (OUTPATIENT)
Dept: NURSING | Facility: CLINIC | Age: 60
End: 2020-09-04

## 2020-09-08 ENCOUNTER — COMMUNICATION - HEALTHEAST (OUTPATIENT)
Dept: INTERNAL MEDICINE | Facility: CLINIC | Age: 60
End: 2020-09-08

## 2020-09-08 DIAGNOSIS — R14.3 EXCESSIVE GAS: ICD-10-CM

## 2020-09-09 ENCOUNTER — RECORDS - HEALTHEAST (OUTPATIENT)
Dept: GENERAL RADIOLOGY | Facility: CLINIC | Age: 60
End: 2020-09-09

## 2020-09-09 DIAGNOSIS — R76.11 NONSPECIFIC REACTION TO TUBERCULIN SKIN TEST WITHOUT ACTIVE TUBERCULOSIS: ICD-10-CM

## 2020-09-10 ENCOUNTER — COMMUNICATION - HEALTHEAST (OUTPATIENT)
Dept: INTERNAL MEDICINE | Facility: CLINIC | Age: 60
End: 2020-09-10

## 2020-09-20 ENCOUNTER — COMMUNICATION - HEALTHEAST (OUTPATIENT)
Dept: INTERNAL MEDICINE | Facility: CLINIC | Age: 60
End: 2020-09-20

## 2020-09-20 DIAGNOSIS — M54.9 BACK PAIN: ICD-10-CM

## 2020-09-23 ENCOUNTER — COMMUNICATION - HEALTHEAST (OUTPATIENT)
Dept: CARE COORDINATION | Facility: CLINIC | Age: 60
End: 2020-09-23

## 2020-09-23 ENCOUNTER — COMMUNICATION - HEALTHEAST (OUTPATIENT)
Dept: NURSING | Facility: CLINIC | Age: 60
End: 2020-09-23

## 2020-10-05 ENCOUNTER — COMMUNICATION - HEALTHEAST (OUTPATIENT)
Dept: INTERNAL MEDICINE | Facility: CLINIC | Age: 60
End: 2020-10-05

## 2020-10-05 DIAGNOSIS — K64.9 HEMORRHOIDS, UNSPECIFIED HEMORRHOID TYPE: ICD-10-CM

## 2020-10-06 ENCOUNTER — COMMUNICATION - HEALTHEAST (OUTPATIENT)
Dept: INTERNAL MEDICINE | Facility: CLINIC | Age: 60
End: 2020-10-06

## 2020-10-06 DIAGNOSIS — I10 ESSENTIAL HYPERTENSION: ICD-10-CM

## 2020-10-12 ENCOUNTER — OFFICE VISIT - HEALTHEAST (OUTPATIENT)
Dept: ADDICTION MEDICINE | Facility: CLINIC | Age: 60
End: 2020-10-12

## 2020-10-12 DIAGNOSIS — F11.20 OPIOID USE DISORDER, SEVERE, DEPENDENCE (H): ICD-10-CM

## 2020-10-12 DIAGNOSIS — F14.20 COCAINE USE DISORDER, MODERATE, DEPENDENCE (H): ICD-10-CM

## 2020-10-12 DIAGNOSIS — F10.20 MODERATE ALCOHOL USE DISORDER (H): ICD-10-CM

## 2020-10-13 ENCOUNTER — OFFICE VISIT - HEALTHEAST (OUTPATIENT)
Dept: INTERNAL MEDICINE | Facility: CLINIC | Age: 60
End: 2020-10-13

## 2020-10-13 ENCOUNTER — COMMUNICATION - HEALTHEAST (OUTPATIENT)
Dept: INTERNAL MEDICINE | Facility: CLINIC | Age: 60
End: 2020-10-13

## 2020-10-13 DIAGNOSIS — Z12.11 SCREEN FOR COLON CANCER: ICD-10-CM

## 2020-10-13 DIAGNOSIS — E11.9 TYPE 2 DIABETES MELLITUS WITHOUT COMPLICATION, WITHOUT LONG-TERM CURRENT USE OF INSULIN (H): ICD-10-CM

## 2020-10-13 DIAGNOSIS — K64.9 HEMORRHOIDS, UNSPECIFIED HEMORRHOID TYPE: ICD-10-CM

## 2020-10-13 DIAGNOSIS — Z23 NEED FOR IMMUNIZATION AGAINST INFLUENZA: ICD-10-CM

## 2020-10-13 DIAGNOSIS — E78.2 MIXED HYPERLIPIDEMIA: ICD-10-CM

## 2020-10-13 DIAGNOSIS — F19.10 CHEMICAL ABUSE (H): ICD-10-CM

## 2020-10-13 DIAGNOSIS — I10 ESSENTIAL HYPERTENSION: ICD-10-CM

## 2020-10-14 ASSESSMENT — PATIENT HEALTH QUESTIONNAIRE - PHQ9: SUM OF ALL RESPONSES TO PHQ QUESTIONS 1-9: 10

## 2020-10-20 ENCOUNTER — COMMUNICATION - HEALTHEAST (OUTPATIENT)
Dept: ADDICTION MEDICINE | Facility: CLINIC | Age: 60
End: 2020-10-20

## 2020-10-22 ENCOUNTER — COMMUNICATION - HEALTHEAST (OUTPATIENT)
Dept: INTERNAL MEDICINE | Facility: CLINIC | Age: 60
End: 2020-10-22

## 2020-10-23 ENCOUNTER — COMMUNICATION - HEALTHEAST (OUTPATIENT)
Dept: CARE COORDINATION | Facility: CLINIC | Age: 60
End: 2020-10-23

## 2020-10-24 ENCOUNTER — COMMUNICATION - HEALTHEAST (OUTPATIENT)
Dept: INTERNAL MEDICINE | Facility: CLINIC | Age: 60
End: 2020-10-24

## 2020-10-24 DIAGNOSIS — I10 ESSENTIAL HYPERTENSION: ICD-10-CM

## 2020-10-29 ENCOUNTER — COMMUNICATION - HEALTHEAST (OUTPATIENT)
Dept: NURSING | Facility: CLINIC | Age: 60
End: 2020-10-29

## 2020-11-03 ENCOUNTER — OFFICE VISIT - HEALTHEAST (OUTPATIENT)
Dept: ADDICTION MEDICINE | Facility: CLINIC | Age: 60
End: 2020-11-03

## 2020-11-03 DIAGNOSIS — F11.20 OPIOID USE DISORDER, MODERATE, DEPENDENCE (H): ICD-10-CM

## 2020-11-03 DIAGNOSIS — F10.20 MODERATE ALCOHOL USE DISORDER (H): ICD-10-CM

## 2020-11-03 DIAGNOSIS — F33.1 MAJOR DEPRESSIVE DISORDER, RECURRENT EPISODE, MODERATE (H): ICD-10-CM

## 2020-11-03 DIAGNOSIS — F14.20 COCAINE USE DISORDER, MODERATE, DEPENDENCE (H): ICD-10-CM

## 2020-11-04 ENCOUNTER — COMMUNICATION - HEALTHEAST (OUTPATIENT)
Dept: CARE COORDINATION | Facility: CLINIC | Age: 60
End: 2020-11-04

## 2020-11-04 ENCOUNTER — OFFICE VISIT - HEALTHEAST (OUTPATIENT)
Dept: ADDICTION MEDICINE | Facility: CLINIC | Age: 60
End: 2020-11-04

## 2020-11-04 ENCOUNTER — COMMUNICATION - HEALTHEAST (OUTPATIENT)
Dept: ADDICTION MEDICINE | Facility: CLINIC | Age: 60
End: 2020-11-04

## 2020-11-04 DIAGNOSIS — F14.20 COCAINE USE DISORDER, MODERATE, DEPENDENCE (H): ICD-10-CM

## 2020-11-04 ASSESSMENT — ACTIVITIES OF DAILY LIVING (ADL): DEPENDENT_IADLS:: CLEANING;COOKING

## 2020-11-05 ENCOUNTER — AMBULATORY - HEALTHEAST (OUTPATIENT)
Dept: ADDICTION MEDICINE | Facility: CLINIC | Age: 60
End: 2020-11-05

## 2020-11-06 ENCOUNTER — OFFICE VISIT - HEALTHEAST (OUTPATIENT)
Dept: ADDICTION MEDICINE | Facility: CLINIC | Age: 60
End: 2020-11-06

## 2020-11-06 DIAGNOSIS — F14.20 COCAINE USE DISORDER, MODERATE, DEPENDENCE (H): ICD-10-CM

## 2020-11-09 ENCOUNTER — OFFICE VISIT - HEALTHEAST (OUTPATIENT)
Dept: ADDICTION MEDICINE | Facility: CLINIC | Age: 60
End: 2020-11-09

## 2020-11-09 DIAGNOSIS — F14.20 COCAINE USE DISORDER, MODERATE, DEPENDENCE (H): ICD-10-CM

## 2020-11-11 ENCOUNTER — OFFICE VISIT - HEALTHEAST (OUTPATIENT)
Dept: ADDICTION MEDICINE | Facility: CLINIC | Age: 60
End: 2020-11-11

## 2020-11-11 DIAGNOSIS — F14.20 COCAINE USE DISORDER, MODERATE, DEPENDENCE (H): ICD-10-CM

## 2020-11-12 ENCOUNTER — AMBULATORY - HEALTHEAST (OUTPATIENT)
Dept: ADDICTION MEDICINE | Facility: CLINIC | Age: 60
End: 2020-11-12

## 2020-11-13 ENCOUNTER — OFFICE VISIT - HEALTHEAST (OUTPATIENT)
Dept: ADDICTION MEDICINE | Facility: CLINIC | Age: 60
End: 2020-11-13

## 2020-11-13 DIAGNOSIS — F14.20 COCAINE USE DISORDER, MODERATE, DEPENDENCE (H): ICD-10-CM

## 2020-11-16 ENCOUNTER — OFFICE VISIT - HEALTHEAST (OUTPATIENT)
Dept: ADDICTION MEDICINE | Facility: CLINIC | Age: 60
End: 2020-11-16

## 2020-11-16 DIAGNOSIS — F14.20 COCAINE USE DISORDER, MODERATE, DEPENDENCE (H): ICD-10-CM

## 2020-11-18 ENCOUNTER — OFFICE VISIT - HEALTHEAST (OUTPATIENT)
Dept: ADDICTION MEDICINE | Facility: CLINIC | Age: 60
End: 2020-11-18

## 2020-11-18 DIAGNOSIS — F14.20 COCAINE USE DISORDER, MODERATE, DEPENDENCE (H): ICD-10-CM

## 2020-11-19 ENCOUNTER — AMBULATORY - HEALTHEAST (OUTPATIENT)
Dept: ADDICTION MEDICINE | Facility: CLINIC | Age: 60
End: 2020-11-19

## 2020-11-19 ENCOUNTER — COMMUNICATION - HEALTHEAST (OUTPATIENT)
Dept: NURSING | Facility: CLINIC | Age: 60
End: 2020-11-19

## 2020-11-20 ENCOUNTER — OFFICE VISIT - HEALTHEAST (OUTPATIENT)
Dept: ADDICTION MEDICINE | Facility: CLINIC | Age: 60
End: 2020-11-20

## 2020-11-20 DIAGNOSIS — F14.20 COCAINE USE DISORDER, MODERATE, DEPENDENCE (H): ICD-10-CM

## 2020-11-25 ENCOUNTER — OFFICE VISIT - HEALTHEAST (OUTPATIENT)
Dept: ADDICTION MEDICINE | Facility: CLINIC | Age: 60
End: 2020-11-25

## 2020-11-25 DIAGNOSIS — F14.20 COCAINE USE DISORDER, MODERATE, DEPENDENCE (H): ICD-10-CM

## 2020-11-27 ENCOUNTER — AMBULATORY - HEALTHEAST (OUTPATIENT)
Dept: ADDICTION MEDICINE | Facility: CLINIC | Age: 60
End: 2020-11-27

## 2020-11-30 ENCOUNTER — OFFICE VISIT - HEALTHEAST (OUTPATIENT)
Dept: ADDICTION MEDICINE | Facility: CLINIC | Age: 60
End: 2020-11-30

## 2020-11-30 DIAGNOSIS — F14.20 COCAINE USE DISORDER, MODERATE, DEPENDENCE (H): ICD-10-CM

## 2020-12-02 ENCOUNTER — AMBULATORY - HEALTHEAST (OUTPATIENT)
Dept: ADDICTION MEDICINE | Facility: CLINIC | Age: 60
End: 2020-12-02

## 2020-12-02 ENCOUNTER — OFFICE VISIT - HEALTHEAST (OUTPATIENT)
Dept: ADDICTION MEDICINE | Facility: CLINIC | Age: 60
End: 2020-12-02

## 2020-12-02 DIAGNOSIS — F14.20 COCAINE USE DISORDER, MODERATE, DEPENDENCE (H): ICD-10-CM

## 2020-12-03 ENCOUNTER — AMBULATORY - HEALTHEAST (OUTPATIENT)
Dept: ADDICTION MEDICINE | Facility: CLINIC | Age: 60
End: 2020-12-03

## 2020-12-09 ENCOUNTER — OFFICE VISIT - HEALTHEAST (OUTPATIENT)
Dept: ADDICTION MEDICINE | Facility: CLINIC | Age: 60
End: 2020-12-09

## 2020-12-09 DIAGNOSIS — F14.20 COCAINE USE DISORDER, MODERATE, DEPENDENCE (H): ICD-10-CM

## 2020-12-10 ENCOUNTER — AMBULATORY - HEALTHEAST (OUTPATIENT)
Dept: ADDICTION MEDICINE | Facility: CLINIC | Age: 60
End: 2020-12-10

## 2020-12-14 ENCOUNTER — AMBULATORY - HEALTHEAST (OUTPATIENT)
Dept: ADDICTION MEDICINE | Facility: CLINIC | Age: 60
End: 2020-12-14

## 2020-12-16 ENCOUNTER — COMMUNICATION - HEALTHEAST (OUTPATIENT)
Dept: CARE COORDINATION | Facility: CLINIC | Age: 60
End: 2020-12-16

## 2020-12-16 ENCOUNTER — OFFICE VISIT - HEALTHEAST (OUTPATIENT)
Dept: ADDICTION MEDICINE | Facility: CLINIC | Age: 60
End: 2020-12-16

## 2020-12-16 DIAGNOSIS — F14.20 COCAINE USE DISORDER, MODERATE, DEPENDENCE (H): ICD-10-CM

## 2020-12-17 ENCOUNTER — AMBULATORY - HEALTHEAST (OUTPATIENT)
Dept: ADDICTION MEDICINE | Facility: CLINIC | Age: 60
End: 2020-12-17

## 2020-12-18 ENCOUNTER — OFFICE VISIT - HEALTHEAST (OUTPATIENT)
Dept: ADDICTION MEDICINE | Facility: CLINIC | Age: 60
End: 2020-12-18

## 2020-12-18 DIAGNOSIS — F14.20 COCAINE USE DISORDER, MODERATE, DEPENDENCE (H): ICD-10-CM

## 2020-12-21 ENCOUNTER — OFFICE VISIT - HEALTHEAST (OUTPATIENT)
Dept: ADDICTION MEDICINE | Facility: CLINIC | Age: 60
End: 2020-12-21

## 2020-12-21 ENCOUNTER — COMMUNICATION - HEALTHEAST (OUTPATIENT)
Dept: INTERNAL MEDICINE | Facility: CLINIC | Age: 60
End: 2020-12-21

## 2020-12-21 DIAGNOSIS — I10 ESSENTIAL HYPERTENSION: ICD-10-CM

## 2020-12-21 DIAGNOSIS — F14.20 COCAINE USE DISORDER, MODERATE, DEPENDENCE (H): ICD-10-CM

## 2020-12-23 ENCOUNTER — OFFICE VISIT - HEALTHEAST (OUTPATIENT)
Dept: ADDICTION MEDICINE | Facility: CLINIC | Age: 60
End: 2020-12-23

## 2020-12-23 DIAGNOSIS — F14.20 COCAINE USE DISORDER, MODERATE, DEPENDENCE (H): ICD-10-CM

## 2020-12-24 ENCOUNTER — AMBULATORY - HEALTHEAST (OUTPATIENT)
Dept: ADDICTION MEDICINE | Facility: CLINIC | Age: 60
End: 2020-12-24

## 2020-12-28 ENCOUNTER — OFFICE VISIT - HEALTHEAST (OUTPATIENT)
Dept: ADDICTION MEDICINE | Facility: CLINIC | Age: 60
End: 2020-12-28

## 2020-12-28 DIAGNOSIS — F14.20 COCAINE USE DISORDER, MODERATE, DEPENDENCE (H): ICD-10-CM

## 2020-12-29 RX ORDER — ACETAMINOPHEN 325 MG/1
650 TABLET ORAL
COMMUNITY
Start: 2019-12-28

## 2020-12-29 RX ORDER — MELOXICAM 15 MG/1
TABLET ORAL
COMMUNITY
Start: 2020-09-21 | End: 2021-10-01

## 2020-12-29 RX ORDER — POLYETHYLENE GLYCOL 3350 17 G/17G
17 POWDER, FOR SOLUTION ORAL
COMMUNITY
Start: 2019-03-25

## 2020-12-29 RX ORDER — AMLODIPINE BESYLATE 5 MG/1
TABLET ORAL
COMMUNITY
Start: 2020-05-20 | End: 2021-07-27

## 2020-12-29 RX ORDER — METHADONE HYDROCHLORIDE 10 MG/5ML
40 SOLUTION ORAL
COMMUNITY

## 2020-12-29 RX ORDER — MULTIVITAMIN,THERAPEUTIC
1 TABLET ORAL
COMMUNITY

## 2020-12-29 RX ORDER — IBUPROFEN 600 MG/1
600 TABLET, FILM COATED ORAL
COMMUNITY
Start: 2019-12-28

## 2020-12-29 RX ORDER — CALCIUM CARBONATE 500 MG/1
2 TABLET, CHEWABLE ORAL
COMMUNITY

## 2020-12-29 RX ORDER — HYDROCHLOROTHIAZIDE 25 MG/1
TABLET ORAL
COMMUNITY
Start: 2020-12-23 | End: 2022-07-28

## 2020-12-29 RX ORDER — FENOFIBRATE 48 MG/1
TABLET, COATED ORAL
COMMUNITY
Start: 2020-02-12 | End: 2022-02-04

## 2020-12-29 RX ORDER — POTASSIUM CHLORIDE 1500 MG/1
TABLET, EXTENDED RELEASE ORAL
COMMUNITY
Start: 2020-10-26 | End: 2021-07-27

## 2020-12-29 RX ORDER — LOSARTAN POTASSIUM 100 MG/1
100 TABLET ORAL
COMMUNITY
Start: 2020-10-06 | End: 2021-10-25

## 2020-12-29 RX ORDER — HYDROCORTISONE 2.5 %
CREAM (GRAM) TOPICAL
COMMUNITY
Start: 2020-06-17

## 2020-12-29 NOTE — PROGRESS NOTES
"Oscar Mojica is a 60 year old male who is being evaluated via a billable telephone visit.      The patient has been notified of following:     \"This telephone visit will be conducted via a call between you and your physician/provider. We have found that certain health care needs can be provided without the need for a physical exam.  This service lets us provide the care you need with a short phone conversation.  If a prescription is necessary we can send it directly to your pharmacy.  If lab work is needed we can place an order for that and you can then stop by our lab to have the test done at a later time.    Telephone visits are billed at different rates depending on your insurance coverage. During this emergency period, for some insurers they may be billed the same as an in-person visit.  Please reach out to your insurance provider with any questions.    If during the course of the call the physician/provider feels a telephone visit is not appropriate, you will not be charged for this service.\"    Patient has given verbal consent for Telephone visit?  Yes    What phone number would you like to be contacted at? [phone]    How would you like to obtain your AVS? Mail a copy      Bryan Copeland MA    Lmtcb. No show.    "

## 2020-12-30 ENCOUNTER — OFFICE VISIT - HEALTHEAST (OUTPATIENT)
Dept: ADDICTION MEDICINE | Facility: CLINIC | Age: 60
End: 2020-12-30

## 2020-12-30 DIAGNOSIS — F14.20 COCAINE USE DISORDER, MODERATE, DEPENDENCE (H): ICD-10-CM

## 2020-12-31 ENCOUNTER — AMBULATORY - HEALTHEAST (OUTPATIENT)
Dept: ADDICTION MEDICINE | Facility: CLINIC | Age: 60
End: 2020-12-31

## 2021-01-04 ENCOUNTER — COMMUNICATION - HEALTHEAST (OUTPATIENT)
Dept: NURSING | Facility: CLINIC | Age: 61
End: 2021-01-04

## 2021-01-04 ENCOUNTER — OFFICE VISIT - HEALTHEAST (OUTPATIENT)
Dept: ADDICTION MEDICINE | Facility: CLINIC | Age: 61
End: 2021-01-04

## 2021-01-04 DIAGNOSIS — F14.20 COCAINE USE DISORDER, MODERATE, DEPENDENCE (H): ICD-10-CM

## 2021-01-06 ENCOUNTER — OFFICE VISIT - HEALTHEAST (OUTPATIENT)
Dept: ADDICTION MEDICINE | Facility: CLINIC | Age: 61
End: 2021-01-06

## 2021-01-06 DIAGNOSIS — F14.20 COCAINE USE DISORDER, MODERATE, DEPENDENCE (H): ICD-10-CM

## 2021-01-07 ENCOUNTER — AMBULATORY - HEALTHEAST (OUTPATIENT)
Dept: ADDICTION MEDICINE | Facility: CLINIC | Age: 61
End: 2021-01-07

## 2021-01-08 ENCOUNTER — COMMUNICATION - HEALTHEAST (OUTPATIENT)
Dept: ADDICTION MEDICINE | Facility: HOSPITAL | Age: 61
End: 2021-01-08

## 2021-01-08 VITALS — WEIGHT: 186 LBS | HEIGHT: 61 IN | BODY MASS INDEX: 35.12 KG/M2

## 2021-01-08 ASSESSMENT — MIFFLIN-ST. JEOR: SCORE: 1522.07

## 2021-01-08 NOTE — PATIENT INSTRUCTIONS
Your BMI is Body mass index is 35.14 kg/m .  Weight management is a personal decision.  If you are interested in exploring weight loss strategies, the following discussion covers the approaches that may be successful. Body mass index (BMI) is one way to tell whether you are at a healthy weight, overweight, or obese. It measures your weight in relation to your height.  A BMI of 18.5 to 24.9 is in the healthy range. A person with a BMI of 25 to 29.9 is considered overweight, and someone with a BMI of 30 or greater is considered obese. More than two-thirds of American adults are considered overweight or obese.  Being overweight or obese increases the risk for further weight gain. Excess weight may lead to heart disease and diabetes.  Creating and following plans for healthy eating and physical activity may help you improve your health.  Weight control is part of healthy lifestyle and includes exercise, emotional health, and healthy eating habits. Careful eating habits lifelong are the mainstay of weight control. Though there are significant health benefits from weight loss, long-term weight loss with diet alone may be very difficult to achieve- studies show long-term success with dietary management in less than 10% of people. Attaining a healthy weight may be especially difficult to achieve in those with severe obesity. In some cases, medications, devices and surgical management might be considered.  What can you do?  If you are overweight or obese and are interested in methods for weight loss, you should discuss this with your provider.     Consider reducing daily calorie intake by 500 calories.     Keep a food journal.     Avoiding skipping meals, consider cutting portions instead.    Diet combined with exercise helps maintain muscle while optimizing fat loss. Strength training is particularly important for building and maintaining muscle mass. Exercise helps reduce stress, increase energy, and improves fitness.  Increasing exercise without diet control, however, may not burn enough calories to loose weight.       Start walking three days a week 10-20 minutes at a time    Work towards walking thirty minutes five days a week     Eventually, increase the speed of your walking for 1-2 minutes at time    In addition, we recommend that you review healthy lifestyles and methods for weight loss available through the National Institutes of Health patient information sites:  http://win.niddk.nih.gov/publications/index.htm    And look into health and wellness programs that may be available through your health insurance provider, employer, local community center, or frank club.    Weight management plan: Patient was referred to their PCP to discuss a diet and exercise plan.

## 2021-01-12 ENCOUNTER — VIRTUAL VISIT (OUTPATIENT)
Dept: SLEEP MEDICINE | Facility: CLINIC | Age: 61
End: 2021-01-12
Payer: COMMERCIAL

## 2021-01-12 DIAGNOSIS — Z53.9 NO SHOW: Primary | ICD-10-CM

## 2021-01-13 ENCOUNTER — OFFICE VISIT - HEALTHEAST (OUTPATIENT)
Dept: ADDICTION MEDICINE | Facility: CLINIC | Age: 61
End: 2021-01-13

## 2021-01-13 DIAGNOSIS — F14.20 COCAINE USE DISORDER, MODERATE, DEPENDENCE (H): ICD-10-CM

## 2021-01-14 ENCOUNTER — AMBULATORY - HEALTHEAST (OUTPATIENT)
Dept: ADDICTION MEDICINE | Facility: CLINIC | Age: 61
End: 2021-01-14

## 2021-01-15 ENCOUNTER — AMBULATORY - HEALTHEAST (OUTPATIENT)
Dept: ADDICTION MEDICINE | Facility: HOSPITAL | Age: 61
End: 2021-01-15

## 2021-01-15 ENCOUNTER — COMMUNICATION - HEALTHEAST (OUTPATIENT)
Dept: CARE COORDINATION | Facility: CLINIC | Age: 61
End: 2021-01-15

## 2021-01-18 ENCOUNTER — OFFICE VISIT - HEALTHEAST (OUTPATIENT)
Dept: ADDICTION MEDICINE | Facility: CLINIC | Age: 61
End: 2021-01-18

## 2021-01-18 DIAGNOSIS — F14.20 COCAINE USE DISORDER, MODERATE, DEPENDENCE (H): ICD-10-CM

## 2021-01-20 ENCOUNTER — OFFICE VISIT - HEALTHEAST (OUTPATIENT)
Dept: ADDICTION MEDICINE | Facility: CLINIC | Age: 61
End: 2021-01-20

## 2021-01-20 ENCOUNTER — COMMUNICATION - HEALTHEAST (OUTPATIENT)
Dept: NURSING | Facility: CLINIC | Age: 61
End: 2021-01-20

## 2021-01-20 DIAGNOSIS — F14.20 COCAINE USE DISORDER, MODERATE, DEPENDENCE (H): ICD-10-CM

## 2021-01-21 ENCOUNTER — AMBULATORY - HEALTHEAST (OUTPATIENT)
Dept: ADDICTION MEDICINE | Facility: CLINIC | Age: 61
End: 2021-01-21

## 2021-01-23 ENCOUNTER — AMBULATORY - HEALTHEAST (OUTPATIENT)
Dept: ADDICTION MEDICINE | Facility: CLINIC | Age: 61
End: 2021-01-23

## 2021-01-25 ENCOUNTER — OFFICE VISIT - HEALTHEAST (OUTPATIENT)
Dept: ADDICTION MEDICINE | Facility: CLINIC | Age: 61
End: 2021-01-25

## 2021-01-25 DIAGNOSIS — F14.20 COCAINE USE DISORDER, MODERATE, DEPENDENCE (H): ICD-10-CM

## 2021-01-27 ENCOUNTER — OFFICE VISIT - HEALTHEAST (OUTPATIENT)
Dept: ADDICTION MEDICINE | Facility: CLINIC | Age: 61
End: 2021-01-27

## 2021-01-27 DIAGNOSIS — F14.20 COCAINE USE DISORDER, MODERATE, DEPENDENCE (H): ICD-10-CM

## 2021-01-28 ENCOUNTER — AMBULATORY - HEALTHEAST (OUTPATIENT)
Dept: ADDICTION MEDICINE | Facility: CLINIC | Age: 61
End: 2021-01-28

## 2021-01-28 ENCOUNTER — COMMUNICATION - HEALTHEAST (OUTPATIENT)
Dept: CARE COORDINATION | Facility: CLINIC | Age: 61
End: 2021-01-28

## 2021-02-01 ENCOUNTER — OFFICE VISIT - HEALTHEAST (OUTPATIENT)
Dept: ADDICTION MEDICINE | Facility: CLINIC | Age: 61
End: 2021-02-01

## 2021-02-01 DIAGNOSIS — F14.20 COCAINE USE DISORDER, MODERATE, DEPENDENCE (H): ICD-10-CM

## 2021-02-02 ENCOUNTER — OFFICE VISIT - HEALTHEAST (OUTPATIENT)
Dept: INTERNAL MEDICINE | Facility: CLINIC | Age: 61
End: 2021-02-02

## 2021-02-02 DIAGNOSIS — G89.29 CHRONIC MIDLINE LOW BACK PAIN WITHOUT SCIATICA: ICD-10-CM

## 2021-02-02 DIAGNOSIS — E11.9 TYPE 2 DIABETES MELLITUS WITHOUT COMPLICATION, WITHOUT LONG-TERM CURRENT USE OF INSULIN (H): ICD-10-CM

## 2021-02-02 DIAGNOSIS — G89.29 CHRONIC PAIN OF LEFT KNEE: ICD-10-CM

## 2021-02-02 DIAGNOSIS — F10.21 ALCOHOL DEPENDENCE IN REMISSION (H): ICD-10-CM

## 2021-02-02 DIAGNOSIS — E78.2 MIXED HYPERLIPIDEMIA: ICD-10-CM

## 2021-02-02 DIAGNOSIS — I10 ESSENTIAL HYPERTENSION: ICD-10-CM

## 2021-02-02 DIAGNOSIS — Z12.11 COLON CANCER SCREENING: ICD-10-CM

## 2021-02-02 DIAGNOSIS — G47.33 OSA (OBSTRUCTIVE SLEEP APNEA): ICD-10-CM

## 2021-02-02 DIAGNOSIS — M25.562 CHRONIC PAIN OF LEFT KNEE: ICD-10-CM

## 2021-02-02 DIAGNOSIS — M54.50 CHRONIC MIDLINE LOW BACK PAIN WITHOUT SCIATICA: ICD-10-CM

## 2021-02-02 LAB
ALBUMIN SERPL-MCNC: 4 G/DL (ref 3.5–5)
ALBUMIN UR-MCNC: NEGATIVE MG/DL
ALP SERPL-CCNC: 47 U/L (ref 45–120)
ALT SERPL W P-5'-P-CCNC: 14 U/L (ref 0–45)
ANION GAP SERPL CALCULATED.3IONS-SCNC: 8 MMOL/L (ref 5–18)
APPEARANCE UR: CLEAR
AST SERPL W P-5'-P-CCNC: 22 U/L (ref 0–40)
BILIRUB DIRECT SERPL-MCNC: 0.1 MG/DL
BILIRUB SERPL-MCNC: 0.3 MG/DL (ref 0–1)
BILIRUB UR QL STRIP: NEGATIVE
BUN SERPL-MCNC: 16 MG/DL (ref 8–22)
CALCIUM SERPL-MCNC: 9.4 MG/DL (ref 8.5–10.5)
CHLORIDE BLD-SCNC: 97 MMOL/L (ref 98–107)
CHOLEST SERPL-MCNC: 188 MG/DL
CO2 SERPL-SCNC: 31 MMOL/L (ref 22–31)
COLOR UR AUTO: YELLOW
CREAT SERPL-MCNC: 1.24 MG/DL (ref 0.7–1.3)
CREAT UR-MCNC: 147.1 MG/DL
ERYTHROCYTE [DISTWIDTH] IN BLOOD BY AUTOMATED COUNT: 12.7 % (ref 11–14.5)
FASTING STATUS PATIENT QL REPORTED: YES
GFR SERPL CREATININE-BSD FRML MDRD: 60 ML/MIN/1.73M2
GLUCOSE BLD-MCNC: 102 MG/DL (ref 70–125)
GLUCOSE UR STRIP-MCNC: NEGATIVE MG/DL
HBA1C MFR BLD: 6.9 %
HCT VFR BLD AUTO: 37.9 % (ref 40–54)
HDLC SERPL-MCNC: 36 MG/DL
HGB BLD-MCNC: 12.3 G/DL (ref 14–18)
HGB UR QL STRIP: NEGATIVE
KETONES UR STRIP-MCNC: NEGATIVE MG/DL
LDLC SERPL CALC-MCNC: 122 MG/DL
LEUKOCYTE ESTERASE UR QL STRIP: NEGATIVE
MCH RBC QN AUTO: 25.1 PG (ref 27–34)
MCHC RBC AUTO-ENTMCNC: 32.5 G/DL (ref 32–36)
MCV RBC AUTO: 77 FL (ref 80–100)
MICROALBUMIN UR-MCNC: 0.59 MG/DL (ref 0–1.99)
MICROALBUMIN/CREAT UR: 4 MG/G
NITRATE UR QL: NEGATIVE
PH UR STRIP: 7 [PH] (ref 5–8)
PLATELET # BLD AUTO: 290 THOU/UL (ref 140–440)
PMV BLD AUTO: 9.6 FL (ref 7–10)
POTASSIUM BLD-SCNC: 4.4 MMOL/L (ref 3.5–5)
PROT SERPL-MCNC: 7.2 G/DL (ref 6–8)
RBC # BLD AUTO: 4.91 MILL/UL (ref 4.4–6.2)
SODIUM SERPL-SCNC: 136 MMOL/L (ref 136–145)
SP GR UR STRIP: 1.02 (ref 1–1.03)
TRIGL SERPL-MCNC: 151 MG/DL
TSH SERPL DL<=0.005 MIU/L-ACNC: 2.65 UIU/ML (ref 0.3–5)
UROBILINOGEN UR STRIP-ACNC: NORMAL
WBC: 6.8 THOU/UL (ref 4–11)

## 2021-02-03 ENCOUNTER — COMMUNICATION - HEALTHEAST (OUTPATIENT)
Dept: INTERNAL MEDICINE | Facility: CLINIC | Age: 61
End: 2021-02-03

## 2021-02-05 ENCOUNTER — COMMUNICATION - HEALTHEAST (OUTPATIENT)
Dept: ADDICTION MEDICINE | Facility: CLINIC | Age: 61
End: 2021-02-05

## 2021-02-05 ENCOUNTER — AMBULATORY - HEALTHEAST (OUTPATIENT)
Dept: ADDICTION MEDICINE | Facility: CLINIC | Age: 61
End: 2021-02-05

## 2021-02-08 ENCOUNTER — COMMUNICATION - HEALTHEAST (OUTPATIENT)
Dept: ADDICTION MEDICINE | Facility: CLINIC | Age: 61
End: 2021-02-08

## 2021-02-09 ENCOUNTER — COMMUNICATION - HEALTHEAST (OUTPATIENT)
Dept: ADDICTION MEDICINE | Facility: CLINIC | Age: 61
End: 2021-02-09

## 2021-02-12 ENCOUNTER — AMBULATORY - HEALTHEAST (OUTPATIENT)
Dept: ADDICTION MEDICINE | Facility: CLINIC | Age: 61
End: 2021-02-12

## 2021-03-04 PROBLEM — E66.01 MORBID OBESITY (H): Status: ACTIVE | Noted: 2019-09-18

## 2021-03-04 PROBLEM — I10 ESSENTIAL HYPERTENSION: Status: ACTIVE | Noted: 2017-12-07

## 2021-03-04 PROBLEM — Z72.0 TOBACCO ABUSE: Status: ACTIVE | Noted: 2021-03-04

## 2021-03-04 PROBLEM — E78.5 DYSLIPIDEMIA: Status: ACTIVE | Noted: 2017-07-30

## 2021-03-04 PROBLEM — F31.9 BIPOLAR 1 DISORDER (H): Status: ACTIVE | Noted: 2021-03-04

## 2021-03-04 PROBLEM — F32.9 MAJOR DEPRESSION: Status: ACTIVE | Noted: 2017-12-07

## 2021-03-04 PROBLEM — B18.2 HEP C W/O COMA, CHRONIC (H): Status: ACTIVE | Noted: 2021-03-04

## 2021-03-04 PROBLEM — F11.20 METHADONE DEPENDENCE (H): Status: ACTIVE | Noted: 2017-07-29

## 2021-03-04 PROBLEM — F14.10 COCAINE SUBSTANCE ABUSE (H): Status: ACTIVE | Noted: 2017-07-29

## 2021-03-04 PROBLEM — G47.33 OBSTRUCTIVE SLEEP APNEA SYNDROME: Status: ACTIVE | Noted: 2019-10-30

## 2021-03-04 PROBLEM — F10.10 ALCOHOL ABUSE: Status: ACTIVE | Noted: 2021-03-04

## 2021-03-04 NOTE — PROGRESS NOTES
Oscar is a 60 year old who is being evaluated via a billable telephone visit.      What phone number would you like to be contacted at? [phone]  How would you like to obtain your AVS? Mail a copy          CPAP Follow-Up Visit:    Date on this visit: 3/5/2021    Oscar Mojica has a follow-up visit today to review his CPAP use for JANE. His medical history is significant for HTN, bipolar 1, DM 2, Hep C, methadone dependence (40 mg daily), history of alcohol, cocaine and heroine abuse.      He was at the bus stop when I called and he forgot he had the appointment. He started having a conversation with a lady in the middle of our conversation.     Previous Study Results:   PSG 08/31/18 as part of his candidacy for bariatric surgery. His AHI 48.3/hr with the lowest O2 sat of 73%    He has not been using CPAP in the last few months because he needs some parts. He needs a new mask. It is worn out. He last replaced it about 1-2 years ago. He says he was using it all night until water started shooting out of it. He may have filled the water chamber too much.     PAP machine: [facility]. Pressure settings: 7-13.2 cm  There was a download from 12/9/20-1/7/21. It shows one day of use for 1:56. His 95th% pressure was 12.6 cm and 95th% leak 27.6. His AHI was 0.5/hr.     Interface:  Mask: full face mask  Chin strap: No  Leak: Yes  Using Humidifier: Yes  Condensation in hose or mask: Yes in the hose     Difficulties with therapy:    [-] Snoring with CPAP: does not think so  [-] Difficulty tolerating the pressure:  [-] Epistaxis/dry nose:   [-] Nasal congestion:  [+] Dry mouth:  [-] Mouth breathing:   [-] Pain/skin breakdown:      Improvements noted with CPAP:   [+] waking up more refreshed  [+] sleeping better with less arousals  [+] improved energy level during the day    Weight change since sleep study: He says his weight is 2 something today. He says he is overweight and his girlfriend complains about his snoring without the  machine.    He says he is on sertraline (?50 mg), prescribed by a provider at [facility]. He says that helps him go to sleep.   He takes methadone 40 mg in the morning.     Bedtime: 8-9-10 PM.  Wake time: 7-8 AM. He naps 8-9 PM to midnight. He is up for an hour and then he goes back to sleep. It does not bother him.   Naps: He may nap in the late afternoon if at home doing nothing. He naps in the evening around 8 PM.       Past medical/surgical history, family history, social history, medications and allergies were reviewed.      Problem List:  Patient Active Problem List    Diagnosis Date Noted     Alcohol abuse 03/04/2021     Priority: Medium     Bipolar 1 disorder (H) 03/04/2021     Priority: Medium     Hep C w/o coma, chronic (H) 03/04/2021     Priority: Medium     [facility] follows       Tobacco abuse 03/04/2021     Priority: Medium     Obstructive sleep apnea syndrome 10/30/2019     Priority: Medium     Morbid obesity (H) 09/18/2019     Priority: Medium     Type 2 diabetes mellitus without complication, without long-term current use of insulin (H) 09/18/2019     Priority: Medium     Essential hypertension 12/07/2017     Priority: Medium     Major depression 12/07/2017     Priority: Medium     Dyslipidemia 07/30/2017     Priority: Medium     Cocaine substance abuse (H) 07/29/2017     Priority: Medium     Methadone dependence (H) 07/29/2017     Priority: Medium     Bilateral carpal tunnel syndrome 07/10/2015     Priority: Medium        Impression/Plan:    (G47.33) Obstructive sleep apnea syndrome  (primary encounter diagnosis)  Comment: Oscar has a visit today with a main purpose of getting a prescription for new supplies. He has not used the machine lately because his mask is worn out. He was quite distracted on the visit today. He was on a bus and talking to various people during the call.  Plan: Comprehensive DME        Resume auto CPAP 7-13.2 cm. A prescription was written for new supplies. We talked about the  schedule for replacing supplies. He was given the number for [facility] in [place]. I reviewed the usage goals, but it did not seem that it registered with him. I also discussed recommendations with him to reduce his sleep in the daytime to help improve his sleep continuity at night.      He will follow up with me in about 3 month(s) to check a download and make sure his usage is up.     Phone call duration: 29 minutes. 3:59 PM-4:28 PM  Bennett Goltz, PA-C    CC: No ref. provider found

## 2021-03-05 ENCOUNTER — VIRTUAL VISIT (OUTPATIENT)
Dept: SLEEP MEDICINE | Facility: CLINIC | Age: 61
End: 2021-03-05
Payer: COMMERCIAL

## 2021-03-05 DIAGNOSIS — G47.33 OBSTRUCTIVE SLEEP APNEA SYNDROME: Primary | ICD-10-CM

## 2021-03-05 PROCEDURE — 99213 OFFICE O/P EST LOW 20 MIN: CPT | Mod: 95 | Performed by: PHYSICIAN ASSISTANT

## 2021-03-08 NOTE — PROGRESS NOTES
Left message for patient to schedule a 3 month follow up with Bennett Goltz.   AVS mailed  Kristina Ruiz MA

## 2021-03-09 ENCOUNTER — RECORDS - HEALTHEAST (OUTPATIENT)
Dept: ADMINISTRATIVE | Facility: OTHER | Age: 61
End: 2021-03-09

## 2021-03-10 ENCOUNTER — COMMUNICATION - HEALTHEAST (OUTPATIENT)
Dept: INTERNAL MEDICINE | Facility: CLINIC | Age: 61
End: 2021-03-10

## 2021-03-10 DIAGNOSIS — E78.2 MIXED HYPERLIPIDEMIA: ICD-10-CM

## 2021-03-15 ENCOUNTER — COMMUNICATION - HEALTHEAST (OUTPATIENT)
Dept: INTERNAL MEDICINE | Facility: CLINIC | Age: 61
End: 2021-03-15

## 2021-03-15 DIAGNOSIS — M15.0 PRIMARY OSTEOARTHRITIS INVOLVING MULTIPLE JOINTS: ICD-10-CM

## 2021-03-18 ENCOUNTER — RECORDS - HEALTHEAST (OUTPATIENT)
Dept: ADMINISTRATIVE | Facility: OTHER | Age: 61
End: 2021-03-18

## 2021-04-01 ENCOUNTER — COMMUNICATION - HEALTHEAST (OUTPATIENT)
Dept: SCHEDULING | Facility: CLINIC | Age: 61
End: 2021-04-01

## 2021-04-08 ENCOUNTER — RECORDS - HEALTHEAST (OUTPATIENT)
Dept: ADMINISTRATIVE | Facility: OTHER | Age: 61
End: 2021-04-08

## 2021-05-26 ASSESSMENT — PATIENT HEALTH QUESTIONNAIRE - PHQ9
SUM OF ALL RESPONSES TO PHQ QUESTIONS 1-9: 10
SUM OF ALL RESPONSES TO PHQ QUESTIONS 1-9: 11
SUM OF ALL RESPONSES TO PHQ QUESTIONS 1-9: 11

## 2021-05-27 NOTE — PATIENT INSTRUCTIONS - HE
Recommendations from today's MTM visit:                                                    MTM (medication therapy management) is a service provided by a clinical pharmacist designed to help you get the most of out of your medicines. and Today we reviewed what your medicines are for, how to know if they are working, that your medicines are safe and how to make your medicine regimen as easy as possible.     1. referral to care management to help with appointments     2. Use CPAP nightly to help with weight loss and improve blood pressure     3.  some tums to use for acid reflux     4. Rosy will talk to Dr. Hodge about switching from sertraline to bupropion     5. Restart taking vitamin D 5000 daily     6.  desonex for your feet     Next MTM visit:     To schedule another MTM appointment, please call the clinic directly or you may call the MTM scheduling line at [phone] or toll-free at [phone].     My Clinical Pharmacist's contact information:                                                      It was a pleasure seeing you today!  Please feel free to contact me with any questions or concerns you have.      Osiel Hill, PharmD, BCACP  Medication Management (MTM) Pharmacist  [facility]      You may receive a survey about the MTM services you received by email and/or US Mail.  I would appreciate your feedback to help me serve you better in the future. Your comments will be anonymous.

## 2021-05-27 NOTE — PROGRESS NOTES
Attempt 1-Care Guide called patient.  If this patient is returning our call please transfer to Francisca at ext. [phone]    Next outreach due: 4/19/19

## 2021-05-27 NOTE — TELEPHONE ENCOUNTER
RN cannot approve Refill Request    RN can NOT refill this medication med is not covered by policy/route to provider. Last office visit: 3/25/2019 Chele Hodge MD Last Physical: 12/7/2017 Last MTM visit: Visit date not found Last visit same specialty: 3/25/2019 Chele Hodge MD.  Next visit within 3 mo: Visit date not found  Next physical within 3 mo: Visit date not found      Kristina Quijano, [facility] Triage/Med Refill 4/11/2019    Requested Prescriptions   Pending Prescriptions Disp Refills     clotrimazole (LOTRIMIN) 1 % cream [Pharmacy Med Name: CLOTRIMAZOLE 1% CREAM 30GM] 30 g 0     Sig: APPLY EXTERNALLY TO THE AFFECTED AREA TWICE DAILY       There is no refill protocol information for this order

## 2021-05-27 NOTE — TELEPHONE ENCOUNTER
Medication Question or Clarification  Who is calling: Patient  What medication are you calling about? (include dose and sig) HCTZ  Who prescribed the medication?: PCP  What is your question/concern?: Does he continue the HCTZ with the amlodpine.  He is waiting to take HCTZ until her hears back. Please advise.   Pharmacy: [id number]  Okay to leave a detailed message?: Yes  Site CMT - Please call the pharmacy to obtain any additional needed information.

## 2021-05-27 NOTE — PROGRESS NOTES
Office Visit - Follow Up   Oscar Mojica   58 y.o. male    Date of Visit: 3/25/2019    Chief Complaint   Patient presents with     Follow-up     wants to discuss laxative, abdominal bloating, states he poops daily but not as much as he should        Assessment and Plan   1. Essential hypertension  Not controlled now with just hydrochlorothiazide 25 mg daily because of his weight gain.  Will add amlodipine 5 mg daily.  - amLODIPine (NORVASC) 5 MG tablet; Take 1 tablet (5 mg total) by mouth daily.  Dispense: 90 tablet; Refill: 3    2. Slow transit constipation  Has lost transit constipation.  MiraLAX works well for him.  Wants to refill of this.  Will give MiraLAX refill.  - polyethylene glycol (MIRALAX) 17 gram packet; Take 1 packet (17 g total) by mouth daily.  Dispense: 30 each; Refill: 6    3. Gastroesophageal reflux disease without esophagitis  Has GERD.  Takes omeprazole.  Does not have GERD symptoms anymore.    4. Current moderate episode of major depressive disorder without prior episode (H)  Anxiety and depression are now controlled.  Does not take medication for this.  NOREEN 7 score is only 5 which suggests mild anxiety.  PHQ 9 score is only 2 which suggests his depression is now in remission.    5. Primary osteoarthritis involving multiple joints  Complains of recurring low back pains and left knee pains.  Was sent to the spine care clinic for his back pains.  Was sent for physical therapy for which he now goes to the [facility] for pool therapy once a week.  Still suffers from chronic back pains and knee pains.  Takes meloxicam which does not work anymore.  Wants to try something else.  Will change this to celecoxib 100 mg daily and see if this works.  - celecoxib (CELEBREX) 200 MG capsule; Take 1 capsule (200 mg total) by mouth daily.  Dispense: 30 capsule; Refill: 2    6. Class 3 severe obesity due to excess calories without serious comorbidity with body mass index (BMI) of 40.0 to 44.9 in adult (H)  Obese  based on his BMI.  Gained 15 pounds since last visit no falls into morbid obesity having BMI of 42.07.  Encouraged to lose weight.  Should not gain more weight next visit.  Informed his increased weight causes his blood pressure to be not controlled.    The following high BMI interventions were performed this visit: encouragement to exercise and weight monitoring    I like him to see our Pharm.D. for MTM.    Follow up in 6 weeks.  Will fast next visit.     History of Present Illness   This 58 y.o. old male is here for follow-up.  Has hypertension.  Not ideally controlled now with just hydrochlorothiazide.  Gained weight since his last visit by almost 15 pounds.  His increased weight made his blood pressure is not well controlled with just hydrochlorothiazide.  Need additional blood pressure medication.  Has chronic back pains and left knee pain due to osteoarthritis.  Was referred to spine care clinic for his chronic back pains.  Was seen by a nurse practitioner.  Was sent for physical therapy.  Now getting pool therapy once a week.  But still continues to have low back pains.  Wants to change his meloxicam because he thinks it does not work anymore.  Has major depression anxiety now controlled.  Does not take antidepressant or anti-anxiety medication.  Complains of constipation.  Responds to MiraLAX.  Wants to get refill.  Has GERD.  Takes omeprazole and resolves his acid reflux.  Obese based on his BMI.  Encouraged to lose weight.    Review of Systems   A 12 point comprehensive review of systems was negative except as noted..     Medications, Allergies and Problem List   Reviewed and updated             Chief Complaint   Follow-up (wants to discuss laxative, abdominal bloating, states he poops daily but not as much as he should)       Patient Profile   Social History     Social History Narrative    Single. No children. Under disability.         Past Medical History   Patient Active Problem List   Diagnosis      "Major depression     Essential hypertension     Left knee pain     Back pain       Past Surgical History  He has no past surgical history on file.       Medications and Allergies   Current Outpatient Medications   Medication Sig     carboxymethylcellulose sodium 0.5 % Drop 2 drops 3 times daily for dry eyes.     clotrimazole (LOTRIMIN) 1 % cream APPLY EXTERNALLY TO THE AFFECTED AREA TWICE DAILY     diclofenac sodium (VOLTAREN) 1 % Gel APPLY 2 GRAMS TOPICALLY FOUR TIMES DAILY.     hydroCHLOROthiazide (HYDRODIURIL) 25 MG tablet Take 1 tablet (25 mg total) by mouth daily.     methadone (DOLOPHINE) 10 mg/5 mL solution Take 45 mg by mouth every 12 (twelve) hours.     omeprazole (PRILOSEC) 20 MG capsule TAKE 1 CAPSULE BY MOUTH EVERY DAY 1 HOUR BEFORE A MEAL     sertraline (ZOLOFT) 100 MG tablet Take 1 tablet (100 mg total) by mouth at bedtime.     amLODIPine (NORVASC) 5 MG tablet Take 1 tablet (5 mg total) by mouth daily.     celecoxib (CELEBREX) 200 MG capsule Take 1 capsule (200 mg total) by mouth daily.     cholecalciferol, vitamin D3, (VITAMIN D3) 5,000 unit Tab Once daily     polyethylene glycol (MIRALAX) 17 gram packet Take 1 packet (17 g total) by mouth daily.     potassium chloride (K-DUR,KLOR-CON) 20 MEQ tablet TAKE 1 TABLET BY MOUTH EVERY 24 HOURS     Allergies   Allergen Reactions     Hay Fever And Allergy Relief      Penicillins Nausea Only        Family and Social History   No family history on file.     Social History     Tobacco Use     Smoking status: Current Every Day Smoker     Packs/day: 0.50     Types: Cigarettes     Smokeless tobacco: Never Used   Substance Use Topics     Alcohol use: No     Comment: 9 months sober.     Drug use: No        Physical Exam       Physical Exam  /90   Pulse 78   Ht 5' 2\" (1.575 m)   Wt (!) 230 lb (104.3 kg)   SpO2 96%   BMI 42.07 kg/m    General appearance: alert, appears stated age, cooperative, no distress and morbidly obese  Head: Normocephalic, without " obvious abnormality, atraumatic  Throat: lips, mucosa, and tongue normal; teeth and gums normal  Neck: no adenopathy, no carotid bruit, no JVD, supple, symmetrical, trachea midline and thyroid not enlarged, symmetric, no tenderness/mass/nodules  Lungs: clear to auscultation bilaterally  Heart: regular rate and rhythm, S1, S2 normal, no murmur, click, rub or gallop  Abdomen: soft, non-tender; bowel sounds normal; no masses,  no organomegaly  Extremities: extremities normal, atraumatic, no cyanosis or edema  Skin: Skin color, texture, turgor normal. No rashes or lesions  Neurologic: Grossly normal  Musculoskeletal: Normal left knee exam with normal range of motion, low back tenderness with mild limitation range of motion     Additional Information        Chele Hodge MD  Internal Medicine  Contact me at [phone]     Additional Information   Current Outpatient Medications   Medication Sig     carboxymethylcellulose sodium 0.5 % Drop 2 drops 3 times daily for dry eyes.     clotrimazole (LOTRIMIN) 1 % cream APPLY EXTERNALLY TO THE AFFECTED AREA TWICE DAILY     diclofenac sodium (VOLTAREN) 1 % Gel APPLY 2 GRAMS TOPICALLY FOUR TIMES DAILY.     hydroCHLOROthiazide (HYDRODIURIL) 25 MG tablet Take 1 tablet (25 mg total) by mouth daily.     methadone (DOLOPHINE) 10 mg/5 mL solution Take 45 mg by mouth every 12 (twelve) hours.     omeprazole (PRILOSEC) 20 MG capsule TAKE 1 CAPSULE BY MOUTH EVERY DAY 1 HOUR BEFORE A MEAL     sertraline (ZOLOFT) 100 MG tablet Take 1 tablet (100 mg total) by mouth at bedtime.     amLODIPine (NORVASC) 5 MG tablet Take 1 tablet (5 mg total) by mouth daily.     celecoxib (CELEBREX) 200 MG capsule Take 1 capsule (200 mg total) by mouth daily.     cholecalciferol, vitamin D3, (VITAMIN D3) 5,000 unit Tab Once daily     polyethylene glycol (MIRALAX) 17 gram packet Take 1 packet (17 g total) by mouth daily.     potassium chloride (K-DUR,KLOR-CON) 20 MEQ tablet TAKE 1 TABLET BY MOUTH EVERY 24  HOURS     Allergies   Allergen Reactions     Hay Fever And Allergy Relief      Penicillins Nausea Only     Social History     Tobacco Use     Smoking status: Current Every Day Smoker     Packs/day: 0.50     Types: Cigarettes     Smokeless tobacco: Never Used   Substance Use Topics     Alcohol use: No     Comment: 9 months sober.     Drug use: No         Time: total time spent with the patient was 25 minutes of which >50% was spent in counseling and coordination of care

## 2021-05-27 NOTE — TELEPHONE ENCOUNTER
Patient notified that he should continue HCTZ at current dose and to start the amlodipine. He verbalizes understanding, advised him to call if he is having hypotensive symptoms.

## 2021-05-27 NOTE — PROGRESS NOTES
MTM Initial Encounter  Assessment & Plan                                                     1. Back pain, unspecified back location, unspecified back pain laterality, unspecified chronicity  Multifaceted plan to work with PT, however he is not adherent to his exercises he may be let go from the program. Encouraged him to work on exercises when he goes to the gym which has been very helpful for him pain. Due to several daily appointments and difficulty with NS's, recommend care management which he is open to. Due to low vitamin D level recommend to restart.   -Referral to care management (OK per Dr. Hodge)   -Restart taking vitamin D daily      2. Essential hypertension  At goal blood pressure <140/90, encouraged him to continue to focus on weight loss and he may be able to taper down and off of one of these over time. Tolerating without adverse effects, continue current regimen.     3. Obstructive sleep apnea syndrome  Reiterated importance of use of CPAP to help improve sleep quality and energy levels, decrease blood pressure and encourage weight loss. He did not remember this discussion from sleep doctor.  -educate to be adherent to CPAP nightly     4. Obesity, unspecified classification, unspecified obesity type, unspecified whether serious comorbidity present  Through diet changes he has already lost 10lbs on his own.He is not interested in weight loss surgery any longer, and does not feel that he needs to return to dietician.  Encouraged him to continue working on this, he does have some breakthrough reflux, suggested use of tums as needed.   -start using tums as needed     5. Reactive depression  He is not adherent to current dose of sertraline, which may be effecting how effective this is for him, consider rotating to bupropion which may also help with decreasing cravings for cigarettes. Will discuss with PCP.   -consider switching sertraline to bupropion (OK per Dr. Hodge)     6. Tobacco  abuse  Interested in quitting smoking, has been difficult to do so. Discussed antidepressant rotation from sertraline to bupropion to improve mood and help decrease cravings for cigarettes.       Follow Up  Return in about 1 month (around 5/3/2019) for Medication Management Pharmacist.      Subjective & Objective                                                     Oscar Mojica is a 58 y.o. male coming in for an initial visit for Medication Therapy Management. He was referred to me from Chele Hodge MD. He is going for his GED, has class every morning.     Chief Complaint: Medication Management     Medication Adherence/Access: He thinks he would benefit from someone to help him schedule appointments, he has a hard time keeping all of his appointments in line. He rarely misses a dose.     Chronic pain: in 2017 was the last time he went to treatment, before this he had been sober on his own for about 2 years. before that he was sober for 5 years. He is now on a methadone program. He now has been sober for 18 months. He would like to get off of this eventually. He is on 35mg methadone daily, and is working with this counselor to help taper this down. He had been up to 50mg daily and this made him too sleepy, he is now at 35mg and has been at this dose for about two months. He is in a group with the methadone clinic once weekly called [facility]. He has also been going to the [facility], exercising, cardio and lifting. He is working on weight loss, he has not seen the dietician since his last appointment. Right when he started taking celebrex he was getting some stomach upset but this has since resolved. He does not take celebrex with food, will eat after he takes celebrex. He is doing pool therapy, spine care recommended this. He is going to start doing his exercises more regularly. He does have some constipation, will have a BM every 2 days.   Vitamin D, Total (25-Hydroxy)   Date Value Ref Range Status   06/28/2018  "20.4 (L) 30.0 - 80.0 ng/mL Final     Hypertension: continues on hydrochlorothiazide and amlodipine daily, denies s/sx hypotension.     Obstructive sleep apnea: Follows with sleep medicine clinic, he has not been adherent to using this device.     Obesity: He is undergoing evaluation with the bariatric team. Hard time following diet due to cravings of sweets. He does use PPI for reflux. While on omeprazole he rarely has symptoms. He is drinking about 6 glasses of water per day. He has been eating small portions more often, he has lost 10lbs.     Depression: used to follow at [facility]. At times he feels \"bored and anxious\" he does not necessarily feel depressed. \"Feels like something is missing.\" The more meetings he goes to the better he feels. He was discharged from the therapist at [facility] due to too many no shows. He notes that he has mood swings. He does not want to take take too much sertraline at one time so he will cut them and take 1/2 tab in the morning and sometimes he will take 50mg at night, he may skip this evening dose. He thinks sertraline interacts with his methadone and makes him really tired. Notes that he used to be on seroquel.     Tobacco abuse: he is not interested in bariatric surgery, but he wants to loose weight. He continues to smoke 1/2 ppd. He finds that this is more of a habit rather than cravings. Every 1-2 hours he will have a cigarette. He has tried to quit in the past, he was using nicotine gum at the time, this did help somewhat. He never completely quit while he was in this program.     PMH: reviewed in EPIC   Allergies/ADRs: reviewed in EPIC   Alcohol: reviewed in EPIC   Tobacco:   Social History     Tobacco Use   Smoking Status Current Every Day Smoker     Packs/day: 0.50     Types: Cigarettes   Smokeless Tobacco Never Used     Today's Vitals:   Vitals:    04/03/19 1323   BP: 120/78   Pulse: 78   Weight: 220 lb 1.6 oz (99.8 kg)     ----------------    Much or all of " the text in this note was generated through the use of Dragon Dictate voice-to-text software. Errors in spelling or words which seem out of context are unintentional. Sound alike errors, in particular, may have escaped editing.    The patient was given a summary of these recommendations as an after visit summary    I spent 60 minutes with this patient today.   All changes were made via collaborative practice agreement with Chele Hodge MD. A copy of the visit note was provided to the patient's provider.     Osiel Hill, PharmD, BCACP  Medication Management (MTM) Pharmacist  [facility]     Current Outpatient Medications   Medication Sig Dispense Refill     amLODIPine (NORVASC) 5 MG tablet Take 1 tablet (5 mg total) by mouth daily. 90 tablet 3     calcium, as carbonate, (TUMS) 200 mg calcium (500 mg) chewable tablet Chew 2 tablets as needed. Acid reflux       carboxymethylcellulose sodium 0.5 % Drop 2 drops 3 times daily for dry eyes. 30 mL 3     celecoxib (CELEBREX) 200 MG capsule Take 1 capsule (200 mg total) by mouth daily. 30 capsule 2     cholecalciferol, vitamin D3, (VITAMIN D3) 5,000 unit Tab Once daily 90 each 3     clotrimazole (LOTRIMIN) 1 % cream APPLY EXTERNALLY TO THE AFFECTED AREA TWICE DAILY 30 g 0     diclofenac sodium (VOLTAREN) 1 % Gel APPLY 2 GRAMS TOPICALLY FOUR TIMES DAILY. 300 g 0     hydroCHLOROthiazide (HYDRODIURIL) 25 MG tablet Take 1 tablet (25 mg total) by mouth daily. 90 tablet 3     methadone (DOLOPHINE) 10 mg/5 mL solution Take 35 mg by mouth every morning.             multivitamin therapeutic tablet Take 1 tablet by mouth daily.       omeprazole (PRILOSEC) 20 MG capsule TAKE 1 CAPSULE BY MOUTH EVERY DAY 1 HOUR BEFORE A MEAL 90 capsule 2     polyethylene glycol (MIRALAX) 17 gram packet Take 1 packet (17 g total) by mouth daily. 30 each 6     potassium chloride (K-DUR,KLOR-CON) 20 MEQ tablet TAKE 1 TABLET BY MOUTH EVERY 24 HOURS 90 tablet 2      sertraline (ZOLOFT) 100 MG tablet Take 0.5 tablets (50 mg total) by mouth 2 (two) times a day. 30 tablet 6     No current facility-administered medications for this visit.

## 2021-05-28 NOTE — PROGRESS NOTES
Dear Dr. Hodge, Chele ORDAZ MD  [address] [place] [address],    Thank you for the opportunity to participate in the care of Oscar Mojica.     He is a 58 y.o. y/o male patient who comes to the sleep medicine clinic for follow up.  The patient states that the reason why he was not using it because he needed to get a new mask.  The patient has some questions about usage and was repetitive in his questioning.    Compliance Download data for 30 days:  Pressure setting: APAP 7-13.2 CWP  Residual AHI: 1.7 events per hour  Leak: Minimal  Compliance: 10%  Mask Tolerance: Good  Skin irritation: None      Past Medical History:   Diagnosis Date     Alcoholism (H)      Anxiety      Arthritis      Depression      Hepatitis C     s/p treatment for 90 days.     Hypertension      Substance abuse (H)     hx of alcohol, cocain, heroin       No past surgical history on file.    Social History     Socioeconomic History     Marital status: Single     Spouse name: Not on file     Number of children: Not on file     Years of education: Not on file     Highest education level: Not on file   Occupational History     Not on file   Social Needs     Financial resource strain: Not on file     Food insecurity:     Worry: Not on file     Inability: Not on file     Transportation needs:     Medical: Not on file     Non-medical: Not on file   Tobacco Use     Smoking status: Current Every Day Smoker     Packs/day: 0.50     Types: Cigarettes     Smokeless tobacco: Never Used   Substance and Sexual Activity     Alcohol use: No     Comment: 9 months sober.     Drug use: No     Sexual activity: Not Currently   Lifestyle     Physical activity:     Days per week: Not on file     Minutes per session: Not on file     Stress: Not on file   Relationships     Social connections:     Talks on phone: Not on file     Gets together: Not on file     Attends Zoroastrian service: Not on file     Active member of club or organization: Not on file      Attends meetings of clubs or organizations: Not on file     Relationship status: Not on file     Intimate partner violence:     Fear of current or ex partner: Not on file     Emotionally abused: Not on file     Physically abused: Not on file     Forced sexual activity: Not on file   Other Topics Concern     Not on file   Social History Narrative    Single. No children. Under disability.            Current Outpatient Medications   Medication Sig Dispense Refill     amLODIPine (NORVASC) 5 MG tablet Take 1 tablet (5 mg total) by mouth daily. 90 tablet 3     buPROPion (WELLBUTRIN SR) 150 MG 12 hr tablet Take 1 tablet (150 mg total) by mouth daily for 7 days, THEN 1 tablet (150 mg total) 2 (two) times a day for 7 days. 60 tablet 11     calcium, as carbonate, (TUMS) 200 mg calcium (500 mg) chewable tablet Chew 2 tablets as needed. Acid reflux       carboxymethylcellulose sodium 0.5 % Drop 2 drops 3 times daily for dry eyes. 30 mL 3     celecoxib (CELEBREX) 200 MG capsule Take 1 capsule (200 mg total) by mouth daily. 30 capsule 2     cholecalciferol, vitamin D3, (VITAMIN D3) 5,000 unit Tab Once daily 90 each 3     clotrimazole (LOTRIMIN) 1 % cream APPLY EXTERNALLY TO THE AFFECTED AREA TWICE DAILY 30 g 0     clotrimazole (LOTRIMIN) 1 % cream APPLY EXTERNALLY TO THE AFFECTED AREA TWICE DAILY 30 g 0     diclofenac sodium (VOLTAREN) 1 % Gel APPLY 2 GRAMS TOPICALLY FOUR TIMES DAILY. 300 g 0     hydroCHLOROthiazide (HYDRODIURIL) 25 MG tablet Take 1 tablet (25 mg total) by mouth daily. 90 tablet 3     methadone (DOLOPHINE) 10 mg/5 mL solution Take 35 mg by mouth every morning.             multivitamin therapeutic tablet Take 1 tablet by mouth daily.       omeprazole (PRILOSEC) 20 MG capsule TAKE 1 CAPSULE BY MOUTH EVERY DAY 1 HOUR BEFORE A MEAL 90 capsule 2     polyethylene glycol (MIRALAX) 17 gram packet Take 1 packet (17 g total) by mouth daily. 30 each 6     potassium chloride (K-DUR,KLOR-CON) 20 MEQ tablet TAKE 1 TABLET BY  "MOUTH EVERY 24 HOURS 90 tablet 2     No current facility-administered medications for this visit.        Allergies   Allergen Reactions     Hay Fever And Allergy Relief      Penicillins Nausea Only       Epworths Sleepiness Scale 9/25/2018 11/30/2018 4/30/2019   Sitting and reading 3 1 2   Watching TV 0 2 1   Sitting, inactive in a public place (e.g. a theatre or a meeting) 1 2 2   As a passenger in a car for an hour without a break 3 1 1   Lying down to rest in the afternoon when circumstances permit 3 3 2   Sitting and talking to someone 0 1 1   Sitting quietly after a lunch without alcohol 3 2 2   In a car, while stopped for a few minutes in traffic 1 0 1   Total score 14 12 12       Physical Exam:  /72   Pulse 84   Ht 5' 2\" (1.575 m)   Wt 219 lb (99.3 kg)   SpO2 98%   BMI 40.06 kg/m    BMI:Body mass index is 40.06 kg/m .   GEN: NAD, obese  Psych: normal mood, normal affect    Labs/Studies:    I reviewed the efficacy and compliance report from his device. Data summarized on the HPI and the PAP compliance flow sheet.     Lab Results   Component Value Date    WBC 7.7 06/28/2018    HGB 13.4 (L) 06/28/2018    HCT 41.8 06/28/2018    MCV 82 06/28/2018     06/28/2018         Chemistry        Component Value Date/Time     (L) 06/28/2018 1522    K 4.1 06/28/2018 1522    CL 96 (L) 06/28/2018 1522    CO2 31 06/28/2018 1522    BUN 16 06/28/2018 1522    CREATININE 1.15 06/28/2018 1522    GLU 89 06/28/2018 1522        Component Value Date/Time    CALCIUM 9.6 06/28/2018 1522    ALKPHOS 74 06/28/2018 1522    AST 23 06/28/2018 1522    ALT 13 06/28/2018 1522    BILITOT 0.5 06/28/2018 1522            No results found for: FERRITIN         Assessment and Plan:  In summary Osacr Mojica is a 58 y.o. year old male who is here for follow-up.    1.  Obstructive sleep apnea on CPAP  Since the patient is comfortable with the current pressure settings I will keep him the way they are.  I strongly advised the patient " to discuss any questions that he has with equipment with the durable medical .  I gave him their card.  I also welcome the patient to follow-up with me 2 years.  2.  Other sleep disturbance     Patient verbalized understanding of these issues, agrees with the plan and all questions were answered today. Patient was given an opportuntity to voice any other symptoms or concerns not listed above. Patient did not have any other symptoms or concerns.      Antonio Israel DO  Board Certified in Internal Medicine and Sleep Medicine  [facility].    I spent a total of 15 minutes of face-to-face encounter and more than 50% of the encounter was used for counseling or coordination of care.    (Note created with Dragon voice recognition and unintended spelling errors and word substitutions may occur)

## 2021-05-28 NOTE — PROGRESS NOTES
My Clinic Care Coordination Care Plan    [facility] [address]  [address]  [address] [place]  [address]  [phone]    My Preferred Method of Contact:  Phone: [phone]    My Primary/Preferred Language:  English    Preferred Learning Style:  Reading information, Face to face discussion, Pictures/Diagrams and Hands on teaching    Emergency Contact: Extended Emergency Contact Information  Primary Emergency Contact: Darrell Mojica  Address: [address]           [place] [address] [facility]  Home Phone: [phone]  Mobile Phone: [phone]  Relation: Sibling     PCP:  Chele Hodge MD  Specialists:    Care Team            Chele Hodge MD   [phone] PCP - General, Internal Medicine    Osiel Hill, PharmD Pharmacist, Pharmacist    Francisca Rollins [facility] Care Coordination Care Guide, Primary Care - CC    [facility] [phone] Fax [phone]           Hospitalizations and/or ED Visits  Most Recent: 4/17/2018     Previous:  11/10/2014   Reason:  Back Pain    Concerns regarding my health: Depression and Back Pain    Advanced Directive/Living Will: The patient was given information regarding Adanced Directives/Living Will      Oscar elected to enroll in the [facility] Care Coordination.  Oscar was given a copy of the Clinic Care Coordination brochure and full description of how to access their care team both during clinic hours and after hours.   My Care Guide's Name and Phone Number:  Francisca Rollins @ [phone]  The Care Guide and myself agreed to be in contact monthly.      Medication Update  The patient's medication list is up to date per David Myhre,RN     Health Maintenance and Immunization Update  The patient's preventive health screenings and immunizations are up to date per Dr. Hodge .    My Emergency Plan  Emergency Plan  Depression  Everyone feels down at times. The  blues are a natural part of life. But an unhappy period that s intense or lasts for more than a couple of weeks can be a sign of depression. Depression is a serious illness. It can disrupt the lives of family and friends. If you know someone you think may be depressed, find out what you can do to help.     Know the serious signals  Warning signals for suicide include:    Threats or talk of suicide    Statements such as  I won t be a problem much longer  or  Nothing matters     Giving away possessions or making a will or  arrangements    Buying a gun or other weapon    Sudden, unexplained cheerfulness or calm after a period of depression  If you notice any of these signs, get help right away. Call a health care professional, mental health clinic, or suicide hotline and ask what action to take. In an emergency, don t hesitate to call the police.     [facility] Mental Health Crisis Lines:  [facility] [phone]  [facility] [phone]  [facility] [phone]  [facility] [phone]  [facility], Adults [phone]  [facility], Children [phone]  [facility], Adults [phone]  [facility], Children [phone]     High Blood Pressure (Hypertension)  You have been diagnosed with high blood pressure (also called hypertension). This means the force of blood against your artery walls is too strong. It also means your heart is working hard to move blood. High blood pressure usually has no symptoms, but over time, it can damage your heart, blood vessels, eyes, kidneys, and other organs. With help from your doctor, you can manage your blood pressure and protect your health.  Taking medications    Learn to take your own blood pressure. Keep a record of your results. Ask your doctor which readings mean that you need medical attention.    Take your blood pressure medication exactly as directed. Don t skip doses. Missing doses can cause your blood pressure to get out of  control.    Avoid medications that contain heart stimulants, including over-the-counter drugs. Check for warnings about high blood pressure on the label.    Check with your doctor before taking a decongestant. Some decongestants can worsen high blood pressure.  Lifestyle changes    Maintain a healthy weight. Get help to lose any extra pounds.    Cut back on salt.  ? Limit canned, dried, packaged, and fast foods.  ? Don t add salt to your food at the table.  ? Season foods with herbs instead of salt when you cook.    Follow the DASH (Dietary Approaches to Stop Hypertension) eating plan. This plan recommends vegetables, fruits, whole gains, and other heart healthy foods.    Begin an exercise program. Ask your doctor how to get started. The American Heart Association recommends aerobic exercise 3 to 4 times a week for an average of 40 minutes at a time, with your doctor's approval. Simple activities like walking or gardening can help.    Break the smoking habit. Enroll in a stop-smoking program to improve your chances of success. Ask your health care provider about programs and medications to help you stop smoking.    Limit drinks that contain caffeine (coffee, black or green tea, cola) to 2 per day.    Never take stimulants such as amphetamines or cocaine; these drugs can be deadly for someone with high blood pressure.    Control your stress. Learn stress-management techniques.    Limit alcohol to no more than 1 drink a day for women and 2 drinks a day for men.  Follow-up care  Make a follow-up appointment as directed by our staff.     When to seek medical care  Call your doctor immediately if you have any of the following:    Chest pain or shortness of breath (call 911)    Moderate to severe headache    Weakness in the muscles of your face, arms, or legs    Trouble speaking    Extreme drowsiness    Confusion    Fainting or dizziness    Pulsating or rushing sound in your ears    Unexplained nosebleed    Weakness,  tingling, or numbness of your face, arms, or legs    Change in vision    Blood pressure measured at home that is greater than 180/110       Tips for Quitting Smoking (Cardiovascular)  Quitting smoking is a gift to yourself, one of the best things you can do to keep your heart disease from getting worse. Smoking reduces oxygen flow to your heart by speeding the buildup of plaque and changing the health of your blood vessels. This increases your risk for heart attack, also known as acute myocardial infarction, or AMI. Quitting helps reduce smoking's harmful effects. You may have tried to quit before, but don t give up. Try again. Many smokers try 4 or 5 times before they succeed. It is never too early to benefit from smoking cessation, especially if you already have chronic conditions such as high blood pressure and high cholesterol that put you at increased risk for cardiovascular disease.  Line up help    Ask for the support of your family and friends.    Join a smoking cessation class, or ask your healthcare provider for a referral to a psychologist who specializes in helping people quit smoking.     Ask your healthcare provider about nicotine replacement products and prescription medicines that can help you quit.  Set a quit date    Choose a date within the next 2 to 4 weeks.    After picking a day, katherine it in bold letters on a calendar.  Set limits    Limit where you can smoke. Pick one room or a porch, and smoke only in that place.    Make smoking outdoors a house rule. Other smokers won t tempt you as much.    Speak to smokers around you about your intent to stop smoking so they can show consideration for you and limit their smoking around you.    Hang a list of  quit benefits  in the spot where you smoke. Put one on the refrigerator and one on your car dashboard.  For more information    smokefree.gov/jiwf-kr-li-expert    National [employer] [place] Smoking Quitline: 877-44U-QUIT (835-569-4021     Managing  Chronic Pain: Medicines  Medicines can help you live better with chronic pain. You may use over-the-counter or prescription medicines. It can take some time and trial and error to work out the best treatment plan for you. Work with your health care provider to find the best medicines for you, and to use them safely and effectively.  Tell your health care provider about all medicines you`re taking, including herbs and vitamins.   A part of your treatment plan  Depending on your situation and the type of pain, you may take medicines:    At times when pain is more intense than usual.    For pain relief throughout the day.    Before activities that tend to trigger pain, like going shopping or doing physical therapy.     To decrease sensitivity to pain and help you sleep.  There are 4 major groupings of medicines for the treatment of chronic pain:  Non-opioids. These include the commonly used medicine acetaminophen as well as the non-steroidal anti-inflammatory drugs (NSAIDs), like aspirin and ibuprofen, naproxen sodium, and ketoprofen. These all help control pain but NSAIDs also help relieve inflammation. These drugs are available over-the-counter. Some NSAIDS are available by prescription only.  Acetaminophen can cause liver damage if taken above the recommended dose. NSAIDs may cause stomach problems like bleeding ulcers. Using them over the long-term can cause heart problems and stroke in a very small number of people. None of these drugs is addictive.  Opioids. This includes drugs, like morphine, oxycodone, codeine, fentanyl, and methadone. Opioids may be used to treat breakthrough pain or severe chronic pain. Opioids are available only by prescription. These medicines may be effective for managing chronic pain. But they are controversial because of their side effects and because they can be addictive.    Adjuvants. This group includes medicines that were originally made to treat other conditions, but were also  found to relieve pain. Examples of adjuvant drugs include antidepressants and anticonvulsants.  Antidepressants. These help pain by working on the same brain chemicals that play a role in depression. They also help improve sleep. Tricyclic antidepressants are 1 group of antidepressants used for treating chronic pain caused by nerve injury (neuropathic pain). Examples include amitriptyline, nortriptyline, and desipramine. Serotonin-norepinephrine reuptake inhibitors (SNRIs), like duloxetine and milnacipran, are also used.  Some types of antidepressants are used in low doses for sleep problems. They may also be prescribed if you are very sensitive to pain or some kinds of nerve pain.  Anticonvulsants, developed to prevent seizures, can help certain pain conditions, particularly nerve (neuropathic) pain. Examples include gabapentin and pregabalin.  Other pain medicines    Topical. These medicines, like lidocaine or capsaicin, are applied to the skin to treat pain in one location.    Muscle relaxants. These may be used to stop painful muscle spasms. Drugs like cyclobenzaprine can be sedating.  Taking medicine safely    Take your medicine on time and in the right dose as prescribed.    Tell your health care professional if your medicine doesn't relieve your pain or work for a long enough time, or if you have side effects.    Don't take other people's medicines. They may not be safe for you.  Avoid alcohol, tobacco, and illegal drugs. These may interact with your medicines causing you harm.     Preventing Falls in the Home  An adult or child can fall for many reasons. If you are an older adult, you may fall because your reaction time slows down. Your muscles and joints may get stiff, weak, or less flexible because of illness, medicines, or a physical condition. These things can also make a child more likely to fall or be injured in a fall.  Other health problems that make falls more likely  include:    Arthritis    Dizziness or lightheadedness when you get out of bed (orthostatic hypotension)    History of a stroke    Dizziness    Anemia    Certain medicines taken for mental illness    Problems with balance or gait    History of falls with or without an injury    Changes in vision (vision impairment)    Changes in thinking skills and memory (cognitive impairment)  Injuries from a fall can include broken bones, dislocated joints, and cuts. When these injuries are serious enough, they can make it impossible for you or a child who is injured in a fall to live on his or her own.  Prevention tips  To help prevent falls and fall-related injuries, follow the tips below.   Floors  Make floors safer by doing the following:     Put nonskid pads under area rugs.    Remove throw rugs.    Replace worn floor coverings.    Tack carpets firmly to each step on carpeted stairs. Put nonskid strips on the edges of uncarpeted stairs.    Keep floors and stairs free of clutter and cords.    Arrange furniture so there are clear pathways.    Clean up any spills right away.    Wear shoes that fit.  Bathrooms  Make bathrooms safer by doing the following:     Install grab bars in the tub or shower.    Apply nonskid strips or put a nonskid rubber mat in the tub or shower.    Sit on a bath chair to bathe.    Use bathmats with nonskid backing.  Lighting and the environment  Improve lighting in your home by doing the following:     Keep a flashlight in each room. Or put a lamp next to the bed within easy reach.    Put nightlights in the bedrooms, hallways, kitchen, and bathrooms.    Make sure all stairways have good lighting.    Take your time when going up and down stairs.    Put handrails on both sides of stairs and in walkways for more support. To prevent injury to your wrist or arm, don t use handrails to pull yourself up.    Install grab bars to pull yourself up.    Move or rearrange items that you use often. This will make them  easier to find or reach.    Look at your home to find any safety hazards. Especially look at doorways, walkways, and the driveway. Remove or repair any safety problems that you find.  Preventing Falls    Having a health problem can make you more likely to fall. Taking certain kinds of medicines may also increase your risk of falls. So, improving your health can help you avoid a fall. Work with your healthcare provider to manage health problems and to review your medicines. If you have your health under control, your risk of falling is lessened.     When to call your healthcare provider  Be sure to call your healthcare provider if you fall. Also call if you have any of these signs or symptoms (someone else may need to point them out to you):    Feeling lightheaded or dizzy more than once a day    Losing your balance often or feeling unsteady on your feet    Feeling numbness in your legs or feet, or noticing a change in the way you walk    Having a steady decline in your memory or mental sharpness       6483-7313 [employer]. [address] [place] [address]. All rights reserved. This information is not intended as a substitute for professional medical care. Always follow your healthcare professional's instructions.      Emergency Plan Recommendation:     When to Use the Emergency Department (ED)  An emergency means you could die if you don t get care quickly. Or you could be hurt permanently (disabled). Read below to know when to use -- and when not to use -- an emergency department (also called ED).     Dangers to your life  Here are examples of emergencies. These need immediate care:  A hard time breathing  Severe chest pain  Choking  Severe bleeding  Suddenly not able to move or speak  Blacking out (fainting)`  Poisoning     Dangers of permanent injuries  Here are other emergencies. These also need immediate care:  Deep cuts or severe burns  Broken bones, or sudden severe pain and swelling  in a joint     When it s an emergency  If you have an emergency, follow these steps:     1. Go to the nearest emergency department  If you can, go to the hospital ED closest to you right away.  If you cannot get there right away, or if it is not safe to take yourself, call 911 or your police emergency number.  2. Call your primary care doctor  Tell your doctor about the emergency. Call within 24 hours of going to the ED.  If you cannot call, have someone call for you.  Go to your doctor (not the ED) for any follow-up care.     When it s not an emergency  If a problem is not an emergency, follow these steps:     1. Call your primary care doctor  If you don t know the name of your doctor, call your health plan.  If you cannot call, have someone call for you.  2. Follow instructions  Your doctor will tell you what you should do.  You may be told to see your doctor right away. You may be told to go to the ED. Or you may be told to go to an urgent care center.  Follow your doctor s advice.    All [facility] clinic patients have access to a Nurse 24 hours a day, 7 days a week.  If you have questions or want advice from a Nurse, please know [facility] is here for you.  You can call your clinic and they will connect you or you can call Care Connection at [phone].  [facility] also has Walk In Care clinics in multiple locations.  Call the number listed above for more information about our Walk In Care clinics or visit the [facility] website at [email or web address].    Patient Support  I will ask my emergency contact for help in supporting me in these goals.  Relationship to patient: Josh  Cell # : [phone] , best time to call is in the afternoon.

## 2021-05-28 NOTE — PROGRESS NOTES
"Non-surgical Weight Loss Follow Up Diet Evaluation    Assessment:  This patient is a 58 y.o. male is being seen today for follow-up non-surgical nutritional evaluation. Today we reviewed the patients current eating habits and level of physical activity, and instructed on the changes that are required for successful weight loss outcomes.    Pt's Initial Weight: 214 lbs  Weight: (!) 222 lb 8 oz (100.9 kg)  Weight loss from initial: -8.5  % Weight loss: -3.97 %    BMI: Body mass index is 40.7 kg/m .  IBW: 116 lbs    Personal goal weight: 150 lb      Pt Active Problem List Diagnosis:  Patient Active Problem List   Diagnosis     Major depression     Essential hypertension     Left knee pain     Back pain     Estimated RMR ([place]-St Jeor equation): 1711 calories  Protein requirements (.5grams to .9grams per pound IBW, 20-30% of calories, minimum of 60-80gm per day):  55-99 grams    Progress made since last visit: 5 month hiatus from last RD visit due to \"having too much on my plate\" and then opened up and stated \"there's no hope for me to changes\".     Concerns: Poor motivation to make dietary changes.     Diet Recall/Time: Pt wakes up 7:00am   -sometimes wakes up in the middle for bowl of cereal.   Breakfast: skipped   Am Snack: none   Lunch: ham and cheese sandwich, potato chips, juice (30g)   Pm snack:none   Dinner: pork chop, macaroni and cheese (20g)   HS Snack: 2 cups watermelon   *eats snack size candies 3/day    Protein: 50 grams    Meals per week away from home:  Rarely     Portion Sizes problematic? YES per patient/diet recall  Encouraged slowing meal times down, 20-30 minutes, chewing to applesauce consistency.   To aid in proper portion control and slow meal time down discussed consuming meals off smaller plates, use toddler/children utensils and set utensils down after each bite.    Beverages (Type/Oz. per day)  Water: 4-6 glasses   Coffee: 1 cup   Regular soda: 1 can/day   Juice: orange juice, soft drinks- " pt can't recall.     Discussed the importance of adequate hydration and the goal of 64+ oz of fluid daily.   The patient understands the importance of  avoiding all sweetened and alcoholic drinks, and instead choosing 64 oz plain water.    Exercise  Nothing routine established.       PES statement:     1. (NI-1.3)Excessive energy intake related to Not ready for diet/lifestyle change as evidenced by Intake of high caloric density foods/beverages (juice, soda) at meals and/or snacks; large portion; Estimated intake that exceeds estimated daily energy intake; Binge eating patterns; Frequent excessive fast food or restaurant intake; and BMI 40.       Intervention:  Discussion:  1. Reviewed the food groups, writing out examples of protein,vegetables, and carbohydrates.  2. Discussed the importance of staying hydrated.  3. Encouraged eating three meals a day, with emphasis on breakfast.     Instructions/Goals:   1. Include 20-30 gm protein at each meal.  2. Increase vegetable/fruit intake, by having a vegetable or fruit with each meal daily. Recommended pt to increase vegetable/fruit intake to 4-5 servings daily.  3. Increase fluid intake to 64oz daily: choose plain or calorie/alcohol-free beverages.  4. Incorporate daily structured activity, 45-60 minutes most days of the week  5. Read food labels more consistently: keeping total fat grams <10, total sugar grams <10, fiber >3gm per serving.   6. Practice plate method: 1/2 plate lean/low fat protein source, vegetable/fruit, <25% of plate complex carbohydrates.  7. Carbohydrates from grain sources at meal times to be no more than 1 Carb Choice, ie: 15-20 gm total carbohydrate per serving  8. Practice eating off of smaller plates/bowls, chewing to applesauce consistency, taking 20-30 minutes to eat in a calm/relaxed environment without distractions of tv/email/cell phone.    Handouts Provided:  Simplified plate method     Monitor/Evaluation:    Pt will f/u PRN with RD and  bariatrician.     Plan for next visit with RD:  Review previous nutrition education.       Time In: 1:30pm  Time Out: 1:45pm      ABN signed: Yes
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
## 2021-05-28 NOTE — PROGRESS NOTES
Silver Sneakers:  Patient is only 58 so he is not eligible for Silver Sneakers (65+) Community Health Worker mailed information for his insurance regarding gym membership reimbursement at the [facility].   He must go 12 times a month for discount/reimbursement.     PCA/ILS worker  Patient is waiting to hear from his [facility]  regarding his assessment for services    Appointments:  Care guide reviewed upcoming appointments and will mail Epic appointment calendar.    Next outreach due: 5/29/19

## 2021-05-28 NOTE — PROGRESS NOTES
Office Visit - Follow Up   Oscar Mojica   58 y.o. male    Date of Visit: 5/1/2019    Chief Complaint   Patient presents with     Back Pain     wants to discuss getting an order for a cane, back, & Lt knee brace. Working with care guides as well.        Assessment and Plan   1. Chronic midline low back pain without sciatica  Has chronic low back pains.  Pains come and go.  Takes celecoxib and apply diclofenac sodium.  But has not difficulty in ambulation.  Uses a walker.  Wants also to use cane at home.  Has difficulty also bending over and squatting because of her back pains.  Wants to get a back brace and knee brace which are fine.  Prescription for cane, back brace, and knee brace were given to the patient.  I asked her to see our clinical care coordinator to help him to procure his assistive devices.  Worried about the  cost of these devices.    2. Essential hypertension  Controlled.  Continue amlodipine and hydrochlorothiazide.    3. Current moderate episode of major depressive disorder without prior episode (H)  Has major depression.  Used to take sertraline.  But was changed to bupropion by our Pharm.D. when he was seen for MTM.  Reports he stopped taking his bupropion because of nausea.  I like him to see our Pharm.D. again for medication management.  - Ambulatory referral to Medication Management      Follow up in 3 months.  Will fast next visit.     History of Present Illness   This 58 y.o. old male is here because he wants to get assistive devices specifically a cane, back brace and knee brace because of his chronic pains involving his low back and knees due to degenerative  disease or osteoarthritis left.  Uses uses a walker when he ambulates outside but once again to use at home.  Also wants a back brace and knee brace because certain movements of his back and knees because pains and wants to avoid doing these movements.  Has hypertension controlled by amlodipine hydrochlorothiazide.  Has major  depression.  Stopped taking his bupropion because of nausea.  Used to take sertraline.  But this was changed by our Pharm.D. to bupropion.  Overall, feels well.  No other complaints.    Review of Systems   A 12 point comprehensive review of systems was negative except as noted..     Medications, Allergies and Problem List   Reviewed and updated             Chief Complaint   Back Pain (wants to discuss getting an order for a cane, back, & Lt knee brace. Working with care guides as well.)       Patient Profile   Social History     Social History Narrative    Single. No children. Under disability.         Past Medical History   Patient Active Problem List   Diagnosis     Major depression     Essential hypertension     Left knee pain     Back pain       Past Surgical History  He has no past surgical history on file.       Medications and Allergies   Current Outpatient Medications   Medication Sig     amLODIPine (NORVASC) 5 MG tablet Take 1 tablet (5 mg total) by mouth daily.     calcium, as carbonate, (TUMS) 200 mg calcium (500 mg) chewable tablet Chew 2 tablets as needed. Acid reflux     carboxymethylcellulose sodium 0.5 % Drop 2 drops 3 times daily for dry eyes.     celecoxib (CELEBREX) 200 MG capsule Take 1 capsule (200 mg total) by mouth daily.     cholecalciferol, vitamin D3, (VITAMIN D3) 5,000 unit Tab Once daily     clotrimazole (LOTRIMIN) 1 % cream APPLY EXTERNALLY TO THE AFFECTED AREA TWICE DAILY     clotrimazole (LOTRIMIN) 1 % cream APPLY EXTERNALLY TO THE AFFECTED AREA TWICE DAILY     diclofenac sodium (VOLTAREN) 1 % Gel APPLY 2 GRAMS TOPICALLY FOUR TIMES DAILY.     hydroCHLOROthiazide (HYDRODIURIL) 25 MG tablet Take 1 tablet (25 mg total) by mouth daily.     methadone (DOLOPHINE) 10 mg/5 mL solution Take 35 mg by mouth every morning.           multivitamin therapeutic tablet Take 1 tablet by mouth daily.     omeprazole (PRILOSEC) 20 MG capsule TAKE 1 CAPSULE BY MOUTH EVERY DAY 1 HOUR BEFORE A MEAL      "polyethylene glycol (MIRALAX) 17 gram packet Take 1 packet (17 g total) by mouth daily.     potassium chloride (K-DUR,KLOR-CON) 20 MEQ tablet TAKE 1 TABLET BY MOUTH EVERY 24 HOURS     buPROPion (WELLBUTRIN SR) 150 MG 12 hr tablet Take 1 tablet (150 mg total) by mouth daily for 7 days, THEN 1 tablet (150 mg total) 2 (two) times a day for 7 days.     Allergies   Allergen Reactions     Hay Fever And Allergy Relief      Penicillins Nausea Only        Family and Social History   No family history on file.     Social History     Tobacco Use     Smoking status: Current Every Day Smoker     Packs/day: 0.50     Types: Cigarettes     Smokeless tobacco: Never Used   Substance Use Topics     Alcohol use: No     Comment: 9 months sober.     Drug use: No        Physical Exam       Physical Exam  /80   Pulse 87   Ht 5' 2\" (1.575 m)   Wt (!) 226 lb (102.5 kg)   SpO2 98%   BMI 41.34 kg/m    General appearance: alert, appears stated age, cooperative and no distress  Head: Normocephalic, without obvious abnormality, atraumatic  Throat: lips, mucosa, and tongue normal; teeth and gums normal  Neck: no adenopathy, no carotid bruit, no JVD, supple, symmetrical, trachea midline and thyroid not enlarged, symmetric, no tenderness/mass/nodules  Lungs: clear to auscultation bilaterally  Heart: regular rate and rhythm, S1, S2 normal, no murmur, click, rub or gallop  Abdomen: soft, non-tender; bowel sounds normal; no masses,  no organomegaly  Extremities: extremities normal, atraumatic, no cyanosis or edema  Skin: Skin color, texture, turgor normal. No rashes or lesions  Neurologic: Grossly normal   Musculoskeletal: Tender low back, normal range of motion, normal knee exam     Additional Information        Chele Hodge MD  Internal Medicine  Contact me at [phone]     Additional Information   Current Outpatient Medications   Medication Sig     amLODIPine (NORVASC) 5 MG tablet Take 1 tablet (5 mg total) by mouth daily.     " calcium, as carbonate, (TUMS) 200 mg calcium (500 mg) chewable tablet Chew 2 tablets as needed. Acid reflux     carboxymethylcellulose sodium 0.5 % Drop 2 drops 3 times daily for dry eyes.     celecoxib (CELEBREX) 200 MG capsule Take 1 capsule (200 mg total) by mouth daily.     cholecalciferol, vitamin D3, (VITAMIN D3) 5,000 unit Tab Once daily     clotrimazole (LOTRIMIN) 1 % cream APPLY EXTERNALLY TO THE AFFECTED AREA TWICE DAILY     clotrimazole (LOTRIMIN) 1 % cream APPLY EXTERNALLY TO THE AFFECTED AREA TWICE DAILY     diclofenac sodium (VOLTAREN) 1 % Gel APPLY 2 GRAMS TOPICALLY FOUR TIMES DAILY.     hydroCHLOROthiazide (HYDRODIURIL) 25 MG tablet Take 1 tablet (25 mg total) by mouth daily.     methadone (DOLOPHINE) 10 mg/5 mL solution Take 35 mg by mouth every morning.           multivitamin therapeutic tablet Take 1 tablet by mouth daily.     omeprazole (PRILOSEC) 20 MG capsule TAKE 1 CAPSULE BY MOUTH EVERY DAY 1 HOUR BEFORE A MEAL     polyethylene glycol (MIRALAX) 17 gram packet Take 1 packet (17 g total) by mouth daily.     potassium chloride (K-DUR,KLOR-CON) 20 MEQ tablet TAKE 1 TABLET BY MOUTH EVERY 24 HOURS     buPROPion (WELLBUTRIN SR) 150 MG 12 hr tablet Take 1 tablet (150 mg total) by mouth daily for 7 days, THEN 1 tablet (150 mg total) 2 (two) times a day for 7 days.     Allergies   Allergen Reactions     Hay Fever And Allergy Relief      Penicillins Nausea Only     Social History     Tobacco Use     Smoking status: Current Every Day Smoker     Packs/day: 0.50     Types: Cigarettes     Smokeless tobacco: Never Used   Substance Use Topics     Alcohol use: No     Comment: 9 months sober.     Drug use: No         Time: total time spent with the patient was 25 minutes of which >50% was spent in counseling and coordination of care

## 2021-05-28 NOTE — PROGRESS NOTES
The Clinic Care Guide called the patient  today at the request of the PCP to discuss possible clinic care coordination enrollment. Clinic care coordination was described to the patient and immediate needs were discussed. The patient agreed to enrollment in clinic care coordination and future appointments were scheduled for an RN care coordination assessment and an enrollment visit with the primary care physician and care guide. The patient was provided with contact information for the clinic care guide.    [facility] date 5/01/19    Care Guide mailed out the following.  1. CCC Brochure  2. Appointment reminder card

## 2021-05-28 NOTE — PROGRESS NOTES
"[facility] Follow up Encounter  Assessment & Plan                                                     1. Current moderate episode of major depressive disorder without prior episode (H)  Tolerating bupropion without adverse effects, in the last few days titrated to twice daily dosing.  Discussed benefits of assisting with depression, weight loss, decreasing cravings for tobacco.  Recommend reassess at follow-up.    2. Tobacco abuse  Continues to smoke same amount of cigarettes but down note that his cravings are going down, continue bupropion and reassess with PCP at next scheduled follow up.     Follow Up  Return in about 1 month (around 5/20/2019) for with PCP.      Subjective & Objective                                                     Oscar Mojica is a 58 y.o. male coming in for an initial visit for Medication Therapy Management. He was referred to me from Chele Hodge MD. He is going for his GED, has class every morning.     Chief Complaint: Medication Management     Medication Adherence/Access: He thinks he would benefit from someone to help him schedule appointments, he has a hard time keeping all of his appointments in line. He rarely misses a dose.     Tobacco Abuse: No change in energy, but has noticed a decrease in cravings for cigarettes. He does not feel more agitated on the bupropion. He is really enjoying going to the [facility] regularly. He is smoking about 10 cigarettes per day, he gives his \"lady friend\" cigarettes as well. Triggers include frustration.     PMH: reviewed in EPIC   Allergies/ADRs: reviewed in EPIC   Alcohol: reviewed in EPIC   Tobacco:   Social History     Tobacco Use   Smoking Status Current Every Day Smoker     Packs/day: 0.50     Types: Cigarettes   Smokeless Tobacco Never Used     Today's Vitals:   Vitals:    04/19/19 1415   BP: 120/78   Pulse: 78   Weight: 220 lb (99.8 kg)     ----------------    Much or all of the text in this note was generated through the use of Dragon Dictate " voice-to-text software. Errors in spelling or words which seem out of context are unintentional. Sound alike errors, in particular, may have escaped editing.    The patient was given a summary of these recommendations as an after visit summary    I spent 30 minutes with this patient today.   All changes were made via collaborative practice agreement with Chele Hodge MD. A copy of the visit note was provided to the patient's provider.     Osiel Hill, PharmD, BCACP  Medication Management (MTM) Pharmacist  [facility]     Current Outpatient Medications   Medication Sig Dispense Refill     buPROPion (WELLBUTRIN SR) 150 MG 12 hr tablet Take 1 tablet (150 mg total) by mouth daily for 7 days, THEN 1 tablet (150 mg total) 2 (two) times a day for 7 days. 60 tablet 11     amLODIPine (NORVASC) 5 MG tablet Take 1 tablet (5 mg total) by mouth daily. 90 tablet 3     calcium, as carbonate, (TUMS) 200 mg calcium (500 mg) chewable tablet Chew 2 tablets as needed. Acid reflux       carboxymethylcellulose sodium 0.5 % Drop 2 drops 3 times daily for dry eyes. 30 mL 3     celecoxib (CELEBREX) 200 MG capsule Take 1 capsule (200 mg total) by mouth daily. 30 capsule 2     cholecalciferol, vitamin D3, (VITAMIN D3) 5,000 unit Tab Once daily 90 each 3     clotrimazole (LOTRIMIN) 1 % cream APPLY EXTERNALLY TO THE AFFECTED AREA TWICE DAILY 30 g 0     clotrimazole (LOTRIMIN) 1 % cream APPLY EXTERNALLY TO THE AFFECTED AREA TWICE DAILY 30 g 0     diclofenac sodium (VOLTAREN) 1 % Gel APPLY 2 GRAMS TOPICALLY FOUR TIMES DAILY. 300 g 0     hydroCHLOROthiazide (HYDRODIURIL) 25 MG tablet Take 1 tablet (25 mg total) by mouth daily. 90 tablet 3     methadone (DOLOPHINE) 10 mg/5 mL solution Take 35 mg by mouth every morning.             multivitamin therapeutic tablet Take 1 tablet by mouth daily.       omeprazole (PRILOSEC) 20 MG capsule TAKE 1 CAPSULE BY MOUTH EVERY DAY 1 HOUR BEFORE A MEAL 90 capsule 2      polyethylene glycol (MIRALAX) 17 gram packet Take 1 packet (17 g total) by mouth daily. 30 each 6     potassium chloride (K-DUR,KLOR-CON) 20 MEQ tablet TAKE 1 TABLET BY MOUTH EVERY 24 HOURS 90 tablet 2     No current facility-administered medications for this visit.

## 2021-05-28 NOTE — PROGRESS NOTES
Office Visit - Follow Up   Oscar Mojica   58 y.o. male    Date of Visit: 5/20/2019    Chief Complaint   Patient presents with     Follow-up     fasting, wants to discuss getting a new walker. Never got the knee braces- needs new order for knee braces. Working out at the [facility] and riding bike.        Assessment and Plan   1. Essential hypertension  Controlled.  Initial blood pressure was 140/72.  But on my recheck it was was 130/80.  Continue amlodipine and hydrochlorothiazide.  Takes potassium supplement daily.  Wants to stop it because he reports it is a big pill to swallow.  Will wait for his potassium  result today.  We will let him know if he can stop his potassium supplement.  Check lipids, basic metabolic panel liver function and CBC.  - Lipid Cascade  - Basic Metabolic Panel  - Hepatic Profile  - HM2(CBC w/o Differential)    2. Primary osteoarthritis involving multiple joints   Has osteoarthritis involving multiple joints specifically his knees and back  causing recurring and persisting back and knee pains.  Wants to get a new walker because his old walker is not functioning well due to the rollers getting out of place.  Also wants to  get a knee and back brace for his knee and back pains.  Prescription for these were provided to the patient    3. Current moderate episode of major depressive disorder without prior episode (H)  Now in remission.  Continue bupropion.  Check TSH.  - Thyroid Stimulating Hormone (TSH)    4. Chemical abuse (H)  Was abusing crack and opioids in the past.  But now sober for 19 months.  Still goes to methadone clinic and gets his methadone.    5. Alcohol abuse  Also  sober from alcohol for 19 months.  Has not touched alcohol since he got treatment for alcohol abuse  19 months ago.      Follow up in 3 months.     History of Present Illness   This 58 y.o. old male is here for follow-up.  Has hypertension controlled by combination of amlodipine and hydrochlorothiazide.  Takes potassium  because of diuretic hydrochlorothiazide he is taking for his hypertension.  Complains of chronic knee and low back pains due to osteoarthritis.  Uses a walker but is now malfunctioning due to the roller of this walker getting out of place all the time.  Wants to get a new walker.  Also wants to get an knee and back brace for his knee and back pains.  Abused alcohol and  crack, cocaine in the past but has been sober now for 19 months.  Still goes to methadone clinic for his methadone prescription.  Had successful outpatient  treatment for alcohol abuse.  Overall, doing and feeling well.  Goes to the [facility] for his exercise.  Wants to finish a few weeks of set exercises  so he can be reimbursed for going to [facility].     Review of Systems   A 12 point comprehensive review of systems was negative except as noted..     Medications, Allergies and Problem List   Reviewed and updated             Chief Complaint   Follow-up (fasting, wants to discuss getting a new walker. Never got the knee braces- needs new order for knee braces. Working out at the [facility] and riding bike.)       Patient Profile   Social History     Social History Narrative    Single. No children. Under disability.         Past Medical History   Patient Active Problem List   Diagnosis     Major depression     Essential hypertension     Left knee pain     Back pain       Past Surgical History  He has no past surgical history on file.       Medications and Allergies   Current Outpatient Medications   Medication Sig     amLODIPine (NORVASC) 5 MG tablet Take 1 tablet (5 mg total) by mouth daily.     calcium, as carbonate, (TUMS) 200 mg calcium (500 mg) chewable tablet Chew 2 tablets as needed. Acid reflux     carboxymethylcellulose sodium 0.5 % Drop 2 drops 3 times daily for dry eyes.     celecoxib (CELEBREX) 200 MG capsule Take 1 capsule (200 mg total) by mouth daily.     cholecalciferol, vitamin D3, (VITAMIN D3) 5,000 unit Tab Once daily     clotrimazole (LOTRIMIN) 1  "% cream APPLY EXTERNALLY TO THE AFFECTED AREA TWICE DAILY     clotrimazole (LOTRIMIN) 1 % cream APPLY EXTERNALLY TO THE AFFECTED AREA TWICE DAILY     diclofenac sodium (VOLTAREN) 1 % Gel APPLY 2 GRAMS TOPICALLY FOUR TIMES DAILY.     hydroCHLOROthiazide (HYDRODIURIL) 25 MG tablet Take 1 tablet (25 mg total) by mouth daily.     methadone (DOLOPHINE) 10 mg/5 mL solution Take 35 mg by mouth every morning.           multivitamin therapeutic tablet Take 1 tablet by mouth daily.     omeprazole (PRILOSEC) 20 MG capsule TAKE 1 CAPSULE BY MOUTH EVERY DAY 1 HOUR BEFORE A MEAL     polyethylene glycol (MIRALAX) 17 gram packet Take 1 packet (17 g total) by mouth daily.     potassium chloride (K-DUR,KLOR-CON) 20 MEQ tablet TAKE 1 TABLET BY MOUTH EVERY 24 HOURS     buPROPion (WELLBUTRIN SR) 150 MG 12 hr tablet Take 1 tablet (150 mg total) by mouth daily for 7 days, THEN 1 tablet (150 mg total) 2 (two) times a day for 7 days.     Allergies   Allergen Reactions     Hay Fever And Allergy Relief      Penicillins Nausea Only        Family and Social History   No family history on file.     Social History     Tobacco Use     Smoking status: Current Every Day Smoker     Packs/day: 0.50     Types: Cigarettes     Smokeless tobacco: Never Used   Substance Use Topics     Alcohol use: No     Comment: 9 months sober.     Drug use: No        Physical Exam       Physical Exam  /72   Pulse 82   Ht 5' 2\" (1.575 m)   Wt (!) 226 lb (102.5 kg)   SpO2 97%   BMI 41.34 kg/m    General appearance: alert, appears stated age, cooperative and no distress  Head: Normocephalic, without obvious abnormality, atraumatic  Throat: lips, mucosa, and tongue normal; teeth and gums normal  Neck: no adenopathy, no carotid bruit, no JVD, supple, symmetrical, trachea midline and thyroid not enlarged, symmetric, no tenderness/mass/nodules  Lungs: clear to auscultation bilaterally  Heart: regular rate and rhythm, S1, S2 normal, no murmur, click, rub or " gallop  Abdomen: soft, non-tender; bowel sounds normal; no masses,  no organomegaly  Extremities: extremities normal, atraumatic, no cyanosis or edema  Skin: Skin color, texture, turgor normal. No rashes or lesions  Neurologic: Grossly normal  Musculoskeletal: Uses a walker to help him ambulate better because of chronic knee and back pains     Additional Information        Chele Hodge MD  Internal Medicine  Contact me at [phone]     Additional Information   Current Outpatient Medications   Medication Sig     amLODIPine (NORVASC) 5 MG tablet Take 1 tablet (5 mg total) by mouth daily.     calcium, as carbonate, (TUMS) 200 mg calcium (500 mg) chewable tablet Chew 2 tablets as needed. Acid reflux     carboxymethylcellulose sodium 0.5 % Drop 2 drops 3 times daily for dry eyes.     celecoxib (CELEBREX) 200 MG capsule Take 1 capsule (200 mg total) by mouth daily.     cholecalciferol, vitamin D3, (VITAMIN D3) 5,000 unit Tab Once daily     clotrimazole (LOTRIMIN) 1 % cream APPLY EXTERNALLY TO THE AFFECTED AREA TWICE DAILY     clotrimazole (LOTRIMIN) 1 % cream APPLY EXTERNALLY TO THE AFFECTED AREA TWICE DAILY     diclofenac sodium (VOLTAREN) 1 % Gel APPLY 2 GRAMS TOPICALLY FOUR TIMES DAILY.     hydroCHLOROthiazide (HYDRODIURIL) 25 MG tablet Take 1 tablet (25 mg total) by mouth daily.     methadone (DOLOPHINE) 10 mg/5 mL solution Take 35 mg by mouth every morning.           multivitamin therapeutic tablet Take 1 tablet by mouth daily.     omeprazole (PRILOSEC) 20 MG capsule TAKE 1 CAPSULE BY MOUTH EVERY DAY 1 HOUR BEFORE A MEAL     polyethylene glycol (MIRALAX) 17 gram packet Take 1 packet (17 g total) by mouth daily.     potassium chloride (K-DUR,KLOR-CON) 20 MEQ tablet TAKE 1 TABLET BY MOUTH EVERY 24 HOURS     buPROPion (WELLBUTRIN SR) 150 MG 12 hr tablet Take 1 tablet (150 mg total) by mouth daily for 7 days, THEN 1 tablet (150 mg total) 2 (two) times a day for 7 days.     Allergies   Allergen Reactions     Hay  Fever And Allergy Relief      Penicillins Nausea Only     Social History     Tobacco Use     Smoking status: Current Every Day Smoker     Packs/day: 0.50     Types: Cigarettes     Smokeless tobacco: Never Used   Substance Use Topics     Alcohol use: No     Comment: 9 months sober.     Drug use: No         Time: total time spent with the patient was 25 minutes of which >50% was spent in counseling and coordination of care

## 2021-05-29 NOTE — PROGRESS NOTES
Patient dropped off [employer] mobility forms on Friday, scheduled visit with community health worker to complete patient portion per patient request.  Patient would also like to see PCP today for some swelling he is having. Appointment scheduled.    Patient was on the bus headed toward school. Reminded patient of his 1 pm appointment with [facility]. Patient forgot.  He was given a calendar with appointment on them printed from [employer] and also got a reminder call from the automated system.   Will discuss how he can be more responsible about his appointments when he come in.    Next outreach due: 6/20/19

## 2021-05-29 NOTE — TELEPHONE ENCOUNTER
Dr. Lawrence covering for TANVIR Junior    Last seen: 5/10/18  NO F/U    11/21/18 Pt was discharged from [facility]. *9-20-18 pt being discharged from [facility] due to several no shows/late cancels    12/4/18 appt cancelled: Provider Initiated (per Nadine, patient has been discharged from [facility]'s care)

## 2021-05-29 NOTE — TELEPHONE ENCOUNTER
Called and spoke with patient. Relayed results and recommendations to patient. Patient agreed with plan and letter was mailed to patient.    Triglycerides are still increased.  I  like you to start fenofibrate 48 mg daily.  Prescription was sent to your pharmacy.  Rest of your lipid profile are normal. Normal all your other labs.  Continue all medications.  Thank you.    Jaci Madrid LPN

## 2021-05-29 NOTE — TELEPHONE ENCOUNTER
Dr. Hodge,  Patient was prescribed fenofibrate 48 mg on 5/22/19 and would like to know if he should be taking hydrochlorothiazide while taking fenofibrate.  Please advise.  Thank you.  Shanae GATICA CMA/LOLIS....................10:21 AM

## 2021-05-29 NOTE — PROGRESS NOTES
Office Visit   Oscar Mojica   58 y.o. male    Date of Visit: 6/4/2019    Chief Complaint   Patient presents with     Edema     Swollen tongue for 2 months        Assessment and Plan   1. Tongue lesion  There is a lesion on his tongue and then an area of swelling.  I am going to send him to dermatology to evaluate this further.  He is in agreement with this plan.  - Ambulatory referral to Dermatology    2. Low vitamin D level  - cholecalciferol, vitamin D3, (VITAMIN D3) 5,000 unit Tab; Once daily  Dispense: 90 each; Refill: 3       No follow-ups on file.     History of Present Illness   This 58 y.o. old male comes in due to an area of swelling on the left side of his tongue.  States is been there for couple of months.  He does not really feel like it is changing.  It bothers him and he would like to have it checked out.  He has no other acute concerns today.    Review of Systems: As above, systems otherwise reviewed and negative.     Medications, Allergies and Problem List   Patient Active Problem List   Diagnosis     Major depression     Essential hypertension     Left knee pain     Back pain     Current Outpatient Medications   Medication Sig Dispense Refill     amLODIPine (NORVASC) 5 MG tablet Take 1 tablet (5 mg total) by mouth daily. 90 tablet 3     calcium, as carbonate, (TUMS) 200 mg calcium (500 mg) chewable tablet Chew 2 tablets as needed. Acid reflux       carboxymethylcellulose sodium 0.5 % Drop 2 drops 3 times daily for dry eyes. 30 mL 3     celecoxib (CELEBREX) 200 MG capsule Take 1 capsule (200 mg total) by mouth daily. 30 capsule 2     cholecalciferol, vitamin D3, (VITAMIN D3) 5,000 unit Tab Once daily 90 each 3     clotrimazole (LOTRIMIN) 1 % cream APPLY EXTERNALLY TO THE AFFECTED AREA TWICE DAILY 30 g 0     clotrimazole (LOTRIMIN) 1 % cream APPLY EXTERNALLY TO THE AFFECTED AREA TWICE DAILY 30 g 0     diclofenac sodium (VOLTAREN) 1 % Gel APPLY 2 GRAMS TOPICALLY FOUR TIMES DAILY. 300 g 0      "fenofibrate (TRICOR) 48 MG tablet Take 1 tablet (48 mg total) by mouth daily. 90 tablet 3     hydroCHLOROthiazide (HYDRODIURIL) 25 MG tablet Take 1 tablet (25 mg total) by mouth daily. 90 tablet 3     methadone (DOLOPHINE) 10 mg/5 mL solution Take 35 mg by mouth every morning.             multivitamin therapeutic tablet Take 1 tablet by mouth daily.       omeprazole (PRILOSEC) 20 MG capsule TAKE 1 CAPSULE BY MOUTH EVERY DAY 1 HOUR BEFORE A MEAL 90 capsule 2     polyethylene glycol (MIRALAX) 17 gram packet Take 1 packet (17 g total) by mouth daily. 30 each 6     potassium chloride (K-DUR,KLOR-CON) 20 MEQ tablet TAKE 1 TABLET BY MOUTH EVERY 24 HOURS 90 tablet 2     sertraline (ZOLOFT) 100 MG tablet TAKE 1 TABLET(100 MG) BY MOUTH AT BEDTIME 30 tablet 0     buPROPion (WELLBUTRIN SR) 150 MG 12 hr tablet Take 1 tablet (150 mg total) by mouth daily for 7 days, THEN 1 tablet (150 mg total) 2 (two) times a day for 7 days. 60 tablet 11     No current facility-administered medications for this visit.      Allergies   Allergen Reactions     Hay Fever And Allergy Relief      Penicillins Nausea Only          Physical Exam     /78 (Patient Site: Right Arm, Patient Position: Sitting)   Pulse 78   Ht 5' 2\" (1.575 m)   Wt (!) 224 lb (101.6 kg)   SpO2 95%   BMI 40.97 kg/m      General: This is an alert, well-appearing male, no apparent distress.  HEENT: On the left side of his tongue there is an area of indentation that is a little bit thickened.  Next to that there is some swelling.  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     Drug use: No          Tanna Gonsalez MD    "

## 2021-05-29 NOTE — TELEPHONE ENCOUNTER
Medication Question or Clarification  Who is calling: Patient  What medication are you calling about? (include dose and sig) hydroCHLOROthiazide (HYDRODIURIL) 25 MG tablet [[id number]]     Electronically signed by: Luis Gill MD on 11/29/18 1120 Status: Active   Ordering user: Luis Gill MD 11/29/18 1120 Authorized by: Luis Gill MD   Frequency: DAILY 11/29/18 - Until Discontinued Released by: Luis Gill MD 11/29/18 1120       Who prescribed the medication?:: Luis Gill MD  What is your question/concern?: Patient wants to know if he still needs to take the above medication with the new blood pressure medication he was prescribed.  Patient did not remember the name of the new medication.  Pharmacy: [facility], [place]  Okay to leave a detailed message?: Yes  Site CMT - Please call the pharmacy to obtain any additional needed information.

## 2021-05-29 NOTE — PROGRESS NOTES
Completed the patient portion of the [employer] form with the patient.  Physician portion given to PCP to complete.  CHW will mail when it is complete.    Next outreach due: 6/20/19

## 2021-05-29 NOTE — TELEPHONE ENCOUNTER
RN cannot approve Refill Request    RN can NOT refill this medication med is not covered by policy/route to provider. Last office visit: 5/20/2019 Chele Hodge MD Last Physical: 12/7/2017 Last MTM visit: Visit date not found Last visit same specialty: 5/20/2019 Chele Hodge MD.  Next visit within 3 mo: Visit date not found  Next physical within 3 mo: Visit date not found      Karol Feliz, [facility] Triage/Med Refill 5/30/2019    Requested Prescriptions   Pending Prescriptions Disp Refills     diclofenac sodium (VOLTAREN) 1 % Gel [Pharmacy Med Name: DICLOFENAC 1% GEL 100GM] 300 g 0     Sig: APPLY 2 GRAMS TOPICALLY FOUR TIMES DAILY.       There is no refill protocol information for this order

## 2021-05-29 NOTE — TELEPHONE ENCOUNTER
Spoke with the patient and relayed message below from Dr. Hodge.  Patient verbalized understanding and had no further questions at this time.    Shanae GATICA, MARY/CMT....................11:36 AM

## 2021-05-29 NOTE — TELEPHONE ENCOUNTER
Yes take both med, hctz is for blood pressure and fenofibrate is for his increased triglycerides.

## 2021-05-30 NOTE — PROGRESS NOTES
"Bariatric Clinic Follow-Up Visit:    Oscar Mojica is a 58 y.o.  male with Body mass index is 40.6 kg/m .  presenting here today for follow-up on non-surgical efforts for weight loss. Original Intake visit occurred on 6/28/18 with a weight of 214 lbs and BMI of 39.1.  Along with diet and behavior changes, he has been using no medications to assist his weight loss goals and has failed to follow up with me since his original intake visit.  He last saw our dietician in May of this year and will be seeing her again today. He has a history of no shows.  See his intake visit notes for details on identified contributors to weight gain in the past.    Weight:   Wt Readings from Last 2 Encounters:   07/22/19 222 lb (100.7 kg)   06/04/19 (!) 224 lb (101.6 kg)    pounds  Height: 5' 2\" (1.575 m) (7/22/2019  9:58 AM)  Initial Weight: 214 lbs (7/22/2019  9:58 AM)  Weight: 222 lb (100.7 kg) (7/22/2019  9:58 AM)  Weight loss from initial: -8 (7/22/2019  9:58 AM)  % Weight loss: -3.74 % (7/22/2019  9:58 AM)  BMI (Calculated): 40.6 (7/22/2019  9:58 AM)  SpO2: 95 % (7/22/2019  9:58 AM)      Comorbidities:  Patient Active Problem List   Diagnosis     Major depression     Essential hypertension     Left knee pain     Back pain       Current Outpatient Medications:      amLODIPine (NORVASC) 5 MG tablet, Take 1 tablet (5 mg total) by mouth daily., Disp: 90 tablet, Rfl: 3     calcium, as carbonate, (TUMS) 200 mg calcium (500 mg) chewable tablet, Chew 2 tablets as needed. Acid reflux, Disp: , Rfl:      carboxymethylcellulose sodium 0.5 % Drop, 2 drops 3 times daily for dry eyes., Disp: 30 mL, Rfl: 3     celecoxib (CELEBREX) 200 MG capsule, Take 1 capsule (200 mg total) by mouth daily., Disp: 30 capsule, Rfl: 2     cholecalciferol, vitamin D3, (VITAMIN D3) 5,000 unit Tab, Once daily, Disp: 90 each, Rfl: 3     clotrimazole (LOTRIMIN) 1 % cream, APPLY EXTERNALLY TO THE AFFECTED AREA TWICE DAILY, Disp: 30 g, Rfl: 0     clotrimazole (LOTRIMIN) 1 " "% cream, APPLY EXTERNALLY TO THE AFFECTED AREA TWICE DAILY, Disp: 30 g, Rfl: 0     diclofenac sodium (VOLTAREN) 1 % Gel, APPLY 2 GRAMS TOPICALLY FOUR TIMES DAILY., Disp: 300 g, Rfl: 0     fenofibrate (TRICOR) 48 MG tablet, TAKE 1 TABLET(48 MG) BY MOUTH DAILY, Disp: 90 tablet, Rfl: 2     hydroCHLOROthiazide (HYDRODIURIL) 25 MG tablet, Take 1 tablet (25 mg total) by mouth daily., Disp: 90 tablet, Rfl: 3     methadone (DOLOPHINE) 10 mg/5 mL solution, Take 35 mg by mouth every morning.   , Disp: , Rfl:      multivitamin therapeutic tablet, Take 1 tablet by mouth daily., Disp: , Rfl:      omeprazole (PRILOSEC) 20 MG capsule, TAKE 1 CAPSULE BY MOUTH EVERY DAY 1 HOUR BEFORE A MEAL, Disp: 90 capsule, Rfl: 2     polyethylene glycol (MIRALAX) 17 gram packet, Take 1 packet (17 g total) by mouth daily., Disp: 30 each, Rfl: 6     potassium chloride (K-DUR,KLOR-CON) 20 MEQ tablet, TAKE 1 TABLET BY MOUTH EVERY 24 HOURS, Disp: 90 tablet, Rfl: 2     sertraline (ZOLOFT) 100 MG tablet, TAKE 1 TABLET(100 MG) BY MOUTH AT BEDTIME, Disp: 30 tablet, Rfl: 0     buPROPion (WELLBUTRIN SR) 150 MG 12 hr tablet, Take 1 tablet (150 mg total) by mouth daily for 7 days, THEN 1 tablet (150 mg total) 2 (two) times a day for 7 days., Disp: 60 tablet, Rfl: 11     liraglutide (VICTOZA) 0.6 mg/0.1 mL (18 mg/3 mL) PnIj injection, Start 0.1ml injected daily for one week, then increase to 0.2ml (1.2mg) daily for a week and then 0.3 ml daily (1.8mg) daily if tolerated., Disp: 3 mL, Rfl: 3     metFORMIN (GLUCOPHAGE) 500 MG tablet, Start once daily with supper for 3 weeks, then increase to twice daily (breakfast and supper)., Disp: 180 tablet, Rfl: 0     pen needle, diabetic (BD TONY 2ND GEN PEN NEEDLE) 32 gauge x 5/32\" Ndle, For use w/ liraglutide., Disp: 100 each, Rfl: 1      Interim: Since our last visit, he has been weaning down on methadone, now at 22mg/day; is back in school for his GED. 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 We discussed the importance of cpap use/JANE treatment for optimizing his weight loss goals and reducing cardiopulmonary complication risks.  He's been weaning down on nicotine and tried some Chantix recently that made him nauseated. He was interested in some \"aids\" to help quit and a referral to the Call it Quits program was given today.  He continues to use cane/walker for ambulation. We focused on embracing the routine of weight loss and discussed pros/cons of metformin/liraglutide to curb sweet tooth cravings and help glycemic balance and appetite given his borderline diabetes and BMI of 40.6.  He's not appropriate for surgical weight loss at this point given his nicotine use and hx of irregular follow up.  He' d been using D3 but isn't sure if he's still using it (low last year at 20). Due for recheck of labs: A1c, D, B12, TSH.   Since his single visit 13 months ago, he's gained 8 lbs.    Plan:   1.  Welcome back to get on track we're going to continue working with dietician on a normal routine. Initially, optimizing protein at 3 meals a day about 5 hours apart and getting 25-30 grams of protein at each meal should make your sweet tooth cravings controllable. Hydrate well with water  oz daily would be fine.    2. Given your borderline diabetes and other medications that may interfere with typical appetite suppressants, I recommend a trial of metformin and liraglutide.  Metformin can be taken once daily with your supper for 3 weeks and if tolerating, increase to TWICE daily dosing, one pill with breakfast and one pill with supper.      Liraglutide may or may not be covered. Don't pay if too expensive. If your insurance covers it, start by using the 0.6mg injection pen once daily for a week and increase to 1.2mg the second week and then 1.8mg thereafter if you tolerate it. Stop if abdominal pains, " "nausea/vomiting/rash or throat swelling.    3. Continue tapering methadone as planned.     4. Continue nicotine quit plan, referral placed today for Call it Quits. 3 minutes of counseling provided.    5. Recheck D, B12 and A1c levels this month.     6. Start using your CPAP again and follow up with the sleep clinic if mask/humidity is a problem.      1. Borderline diabetic  Glycosylated Hemoglobin A1c    Vitamin B12    metFORMIN (GLUCOPHAGE) 500 MG tablet    liraglutide (VICTOZA) 0.6 mg/0.1 mL (18 mg/3 mL) PnIj injection    pen needle, diabetic (BD TONY 2ND GEN PEN NEEDLE) 32 gauge x 5/32\" Ndle   2. Obesity, Class III, BMI 40-49.9 (morbid obesity) (H)  Thyroid Stimulating Hormone (TSH)   3. Low vitamin D level  Vitamin D, Total (25-Hydroxy)       We discussed [facility] Bariatric Basics including:  -eating 3 meals daily  -eating protein first  -eating slowly, chewing food well  -avoiding/limiting calorie containing beverages  -We discussed the importance of restorative sleep and stress management in maintaining a healthy weight.  -We discussed the National Weight Control Registry healthy weight maintenance strategies and ways to optimize metabolism.  -We discussed the importance of physical activity including cardiovascular and strength training in maintaining a healthier weight and explored viable options.    Most recent labs:  Lab Results   Component Value Date    WBC 7.2 05/20/2019    HGB 13.2 (L) 05/20/2019    HCT 39.1 (L) 05/20/2019    MCV 77 (L) 05/20/2019     05/20/2019     Lab Results   Component Value Date    CHOL 190 05/20/2019     Lab Results   Component Value Date    HDL 30 (L) 05/20/2019     Lab Results   Component Value Date    LDLCALC 98 05/20/2019     Lab Results   Component Value Date    TRIG 310 (H) 05/20/2019     No components found for: CHOLHDL  Lab Results   Component Value Date    ALT 16 05/20/2019    AST 22 05/20/2019    ALKPHOS 75 05/20/2019    BILITOT 0.4 05/20/2019     Lab Results " "  Component Value Date    HGBA1C 6.9 (H) 06/28/2018     Lab Results   Component Value Date    GFACGZWL12 585 06/28/2018     Lab Results   Component Value Date    IMERHISU10KB 20.4 (L) 06/28/2018     No results found for: FERRITIN  No results found for: PTH  No results found for: 81805  No results found for: 7597  Lab Results   Component Value Date    TSH 1.75 05/20/2019     No results found for: TESTOSTERONE    DIETARY HISTORY    Positive Changes Since Last Visit: more proactive then last visit  Struggling With: structure    Knowledgeable in Reading Food Labels: no  Getting Adequate Protein: no  Sleeping 7-8 hours/day unsure. Not using cpap.  Stress management na    PHYSICAL ACTIVITY PATTERNS:  Cardiovascular: some [employer] access  Strength Training: na    REVIEW OF SYSTEMS  GENERAL/CONSTITUTIONAL:  Tired.  HEENT:   na  CARDIOVASCULAR:   no pain  PULMONARY:   no cough  GASTROINTESTINAL:  No pain  UROLOGIC:  na  NEUROLOGIC:  na  PSYCHIATRIC:  Less methadone, tapers every Monday, down to 22mg/day now (previously in the 30s).   MUSCULOSKELETAL/RHEUMATOLOGIC   cane/walker for ambulation due to back issues.  ENDOCRINE:  Not using any glycemic control meds  DERMATOLOGIC:  No rash    PHYSICAL EXAM:  Vitals: /75   Pulse 79   Resp 18   Ht 5' 2\" (1.575 m)   Wt 222 lb (100.7 kg)   SpO2 95%   BMI 40.60 kg/m    Height: 5' 2\" (1.575 m) (7/22/2019  9:58 AM)  Initial Weight: 214 lbs (7/22/2019  9:58 AM)  Weight: 222 lb (100.7 kg) (7/22/2019  9:58 AM)  Weight loss from initial: -8 (7/22/2019  9:58 AM)  % Weight loss: -3.74 % (7/22/2019  9:58 AM)  BMI (Calculated): 40.6 (7/22/2019  9:58 AM)  SpO2: 95 % (7/22/2019  9:58 AM)      GEN: Pleasant, well groomed, in no acute distress  HEENT: PEERL, EOMI, airway clear .  NECK: No swelling.  HEART: Rhythm regular, rate regular, no murmur   LUNGS: Clear without crackles or wheezes. No cough.  ABDOMEN: obese..  EXTREMITIES: 2 plus palpable peripheral pulses, radial. No tremor or " edema.  NEURO: Alert and Oriented X3, normal gait and speech.  SKIN: No visible rashes, pallor or jaundice.        25 minutes was spent in direct consultation, with over 50% of it spent in counseling regarding their plan for excess weight reduction and health modification.  Dennis Saini MD  [facility]  11:08 AM

## 2021-05-30 NOTE — PROGRESS NOTES
Patient has been keeping his appointments.  CHW continues to mail his [facility] appointment calendar to him monthly.     He has has his county assessment and is now waiting to see if he is approved for the CADI waiver.   If he is approved he would like to get an ILS worker and also be able to get a membership at the [facility] [place].     Next outreach due: 7/30/19

## 2021-05-30 NOTE — TELEPHONE ENCOUNTER
RN cannot approve Refill Request    RN can NOT refill this medication med is not covered by policy/route to provider.       Kaya Flores, [facility] Triage/Med Refill 7/25/2019    Requested Prescriptions   Pending Prescriptions Disp Refills     diclofenac sodium (VOLTAREN) 1 % Gel [Pharmacy Med Name: DICLOFENAC 1% GEL 100GM] 300 g 0     Sig: APPLY 2 GRAMS TOPICALLY FOUR TIMES DAILY.       There is no refill protocol information for this order        clotrimazole (LOTRIMIN) 1 % cream [Pharmacy Med Name: CLOTRIMAZOLE 1% CREAM (OTC) 30GM] 30 g 0     Sig: APPLY EXTERNALLY TO THE AFFECTED AREA TWICE DAILY       There is no refill protocol information for this order      Signed Prescriptions Disp Refills    potassium chloride (K-DUR,KLOR-CON) 20 MEQ tablet 90 tablet 2     Sig: TAKE 1 TABLET BY MOUTH EVERY 24 HOURS       Potassium Supplements Refill Protocol Passed - 7/24/2019  6:05 PM        Passed - PCP or prescribing provider visit in past 12 months       Last office visit with prescriber/PCP: 5/20/2019 Chele Hodge MD OR same dept: 6/4/2019 Tanna Gonsalez MD OR same specialty: 6/4/2019 Tanna Gonsalez MD  Last physical: 12/7/2017 Last MTM visit: Visit date not found   Next visit within 3 mo: Visit date not found  Next physical within 3 mo: Visit date not found  Prescriber OR PCP: Chele Hodge MD  Last diagnosis associated with med order: 1. Primary osteoarthritis involving multiple joints  - diclofenac sodium (VOLTAREN) 1 % Gel [Pharmacy Med Name: DICLOFENAC 1% GEL 100GM]; APPLY 2 GRAMS TOPICALLY FOUR TIMES DAILY.  Dispense: 300 g; Refill: 0    2. Essential hypertension  - potassium chloride (K-DUR,KLOR-CON) 20 MEQ tablet; TAKE 1 TABLET BY MOUTH EVERY 24 HOURS  Dispense: 90 tablet; Refill: 2    If protocol passes may refill for 12 months if within 3 months of last provider visit (or a total of 15 months).             Passed - Potassium level in last 12 months     Lab Results   Component Value  Date    Potassium 4.0 05/20/2019

## 2021-05-30 NOTE — PROGRESS NOTES
CHW received message from Clinic RN Supervisor regarding patient's need for teaching on how to use Victoza.    Patient was called and scheduled to see [facility] RN on 7/31/19 @ 1 pm. He was asked to bring his Victoza with him to the visit.    Next outreach due: 8/6/19

## 2021-05-30 NOTE — TELEPHONE ENCOUNTER
Pt called in states he didn't know how to use victoza pen.  Inform the Pt to have nurse only appointment.  Pt states he will go to [facility] saturday.  No other concern at this time.       Suresh Douglas RN, [facility] Triage/Med Refill 7/25/2019 6:11 PM

## 2021-05-30 NOTE — TELEPHONE ENCOUNTER
Referral Request  Type of referral: Phycologist / Mental Health   Who s requesting: Patient   Why the request: Depression   Have you been seen for this request: Yes Patient has an apt w/ PCP 08/19 .   Does patient have a preference on a group/provider? Please advise  - Patient will need help scheduling this apt per approval .   Okay to leave a detailed message?  Yes

## 2021-05-30 NOTE — PATIENT INSTRUCTIONS - HE
"Plan:  1. Welcome back to get on track we're going to continue working with dietician on a normal routine. Initially, optimizing protein at 3 meals a day about 5 hours apart and getting 25-30 grams of protein at each meal should make your sweet tooth cravings controllable. Hydrate well with water  oz daily would be fine.    2. Given your borderline diabetes and other medications that may interfere with typical appetite suppressants, I recommend a trial of metformin and liraglutide.  Metformin can be taken once daily with your supper for 3 weeks and if tolerating, increase to TWICE daily dosing, one pill with breakfast and one pill with supper.      Liraglutide may or may not be covered. Don't pay if too expensive. If your insurance covers it, start by using the 0.6mg injection pen once daily for a week and increase to 1.2mg the second week and then 1.8mg thereafter if you tolerate it. Stop if abdominal pains, nausea/vomiting/rash or throat swelling.    3. Continue tapering methadone as planned.     4. Continue nicotine quit plan, referral placed today for Call it Quits.     5. Recheck D, B12 and A1c levels this month.     6. Start using your CPAP again and follow up with the sleep clinic if mask/humidity is a problem.      What makes a person succeed with dramatic and sustained weight loss?    It's being at the right point in your life where you feel the need to lose the weight, not because anyone told you so but because of a voice inside of you that says, \"I am ready for this\".  You're now at a point where you may be feeling anxious, irritable and when you look in the mirror you do not recognize the person looking back.  Your true self is buried somewhere in that reflexion and you want to free it again.  This is the sort of motivation that leads to success, and it comes from you.    Because the only person that can lose that extra weight is you, I like my patients to focus on the mindset of being in Weight " "Loss Season.  This gives you permission to make the changes necessary to be consistent with the diet/activity and behavior changes that lead to successful, healthy weight loss.  Nearly any diet plan can work for weight loss, but keeping it healthy and nutrient based to prevent deficiencies/hair loss/fatigue or irritability is vital.  If you have a plan you want to try, we'll work with you to make sure no adjustments are needed to keep you healthy through your weight loss season and working with our Bariatric Nutritionists you'll be given expert guidance to customize your diet plan to suit your particular needs. If you don't have your own ideas in mind, we are always happy to suggest well researched and validated plans that provide enough food to prevent hunger but still tap into your excess fat reserves and lose weight in a sustainable fashion.  There is great evidence that lean protein/healthy fat intake with good fiber intake while minimizing simple starches/carbs produces reliable/sustainable weight loss in most people. But some feel more connected to an intermittent fasting/fast mimicking or ketogenic diet.  These protocols can be hard for many to stick with and that's why we prefer the protein/healthy fat focused diets but if these alternative strategies appeal to you, we can work with you to optimize your knowledge and results with these tools.    Losing weight is a temporary commitment, but you need to be \"All In\" to have a good weight loss season.  To avoid frustration, you have to be willing to be nearly perfect 19 out of 20 days or even better than that. But, weight loss season is generally only 4-8 months in length. After that length of time, it can be hard to maintain a negative calorie balance and our brain, motivations and metabolism will usually bring you to a plateau that cannot be broken in this modern world where other commitments start to take priority. That's when we look to stabilize the weight " "loss you've achieved.  If you've reached your goal by that time, fantastic, and job well done.  If there is more to go, then after a few months of stabilization, we can usually attack that previous plateau and break into new territory.    Because of this time limitation, we want to really get to work right away and get into a sustainable routine ASAP.  One of the best predictors of how much weight you're going to lose throughout the season is how much you lose in the first 6 weeks, so prepare well and jump in with both feet.      Occasionally, people may feel like they cannot commit fully to the changes necessary and may want to change one thing at a time and \"get used to\" the idea of losing weight.  That is OK because that is where they are in their life, and they cannot fully commit for any number of reasons.  It's part of that internal motivation and they just haven't reached PRIORTY NUMBER ONE status yet. It's possible that what they need is more time to reach that point and I am always willing to work with people that want to \"dabble\", but understand, the amount of success obtained with half measures, is much less than half results. But Behavior Change cannot occur until a person goes through the contemplation and preparation phases necessary to have successful action and we are more than willing to give you targets to move along the spectrum and get to that point where you feel ready to  commit fully to the weight loss season.      As you go through your plan, look for things to keep your motivation rolling.  The most successful people have a goal or target/reward that they are working towards.  Having a reward that celebrates your new fitness, mobility and energy is the best sort because it will encourage you to do well with the weight maintenance phase and long term lifestyle changes that promotes keeping the weight off for the long term.  Usually, \"getting healthier\" or improving blood tests or losing " weight so your clothes fit better is not as internally motivating as having a tangible reward.  A more motivating reward is one that isn't food based, is affordable, but something special:  Something you won't be getting unless you achieve your goal.   It s important to keep to the rule of success:  in order to get the reward, your goal MUST be achieved. Write this reward down, where you can look at it daily and keep it in the front of your mind as you go through your weight loss season and it will help keep you on track.    Tools that help change behavior are vital for success. The most studied and most supported tool for weight loss is nothing more than writing down your food and weight every day.  Every Day.  Accurately and completely.  When you commit to weight loss season, this information tells you whether you're getting ENOUGH food to fuel your weight loss properly as well as teaches you the interaction between different foods you eat and how your body responds with weight loss.  You'll see that sometimes after a heavy workout you don't see the scale move until 2-3 days later.  How saltier meals (chili for example) may make you retain water for 4-5 days before you see the weight come off, you'll get used to the mini-plateaus that develop after a good 3-8 lb drop in weight as well as how you break through if you keep working the diet as you should.    Weight loss is not a linear process, there are mini ups and downs.  Learning how your body loses weight and getting comfortable with that is very rewarding. The act of writing words on paper also solidifies your will power and commitment to the season of weight loss and that by itself changes your brain chemistry/appetite, motivation and prepares you for maximal success.      Behavior change is all about getting into a new routine.  The old habits and routine have to change because without changing the circumstances of how you gained your weight, it's unlikely  "you'll enjoy satisfying results. If you have snacking habits, like every time you walk through the kitchen you grab a little something, well, that habit has to change and be replaced by a new habit.  It can be something as simple as keeping a doodle pad on the counter that you make a few scribbles and then walk through the kitchen having not opened the cupboard, or starting with a glass of water and leaving the kitchen without anything else, or checking your food journal to see how many calories you have left for the day.  Boredom is the enemy as are the old habits. Break new ground and try to push those old habits into a deep hole.      Finally, exercise always helps.  While not mandatory to lose weight, every little bit helps and exercise has so many other benefits that to not work it into your plan is to miss out on all the mood, sleep, stress and general health benefits that come from making yourself a little short of breath and sweaty at least 3-4 days out of the week.  The metabolism and calorie burn benefits aside, almost every chronic ailment in medicine gets better with proper, aerobic exercise.  Allow yourself to start slow and let your body prepare itself to accept harder training 4-6 weeks down the road, but start now and commit to a plan.  Whether you have the means to hire a , join a gym or just walk out your front door or go down to your basement for a video workout, get into a exercise routine and  after 3 weeks of at least 3 times a week exercise you should be at a point where you can slowly start ramping up 10% each week to our goal of at least 150-300minutes weekly of aerobic exercise and at least twice weekly resistance training/strenghtening with weights/bands or body weight exercises.     I am a big fan of modifying the free training plan, \"Couch to 5k\" for almost all of my patients. Just type it into [employer] or look it up on your smart phone [employer] store.  To modify the plan,  you can use " "the training plan for whatever aerobic activity you do (bike/treadmill/elliptical/rower/pool/etc). During the \"jog\" intervals you just move a little faster or harder, or increase the tension or incline.  You use those little intervals to switch up the workout and recruit more muscles and pump the blood a little more and then recover again in the \"walk\" intervals by slowing down, decreasing the incline or turning down the tension.  3-4 days a week is not that much to ask and the benefits are enormous.  Start slow and develop the base from which you can then build on and reduce the risk of injury.  It's much more important 2-4 months from now to be enjoying your exercise then it is to over exert yourself at the start and hurting yourself.  Starting slowly allows your body to accept the training better down the road when the exercise becomes crucial for weight maintenance.  Without exercise down the road during your maintenance phase, all this hard work you are about to put in can be undone. It usually takes about 100-300 calories a day of exercise to maintain a weight loss and our focus during weight loss season is to generate the routine/activities and hobbies that make that enjoyable/sustainable.    Thanks for taking this first and most important step in your weight loss season.  Commit to it and we will cheerlead you all the way to success.  When things get tough or off track we'll offer guidance and analysis and when you reach your goal we'll celebrate your success.  In the end, it is all about your success and what you do with it.      Dennis Saini MD  [facility]  [phone]        Weight Loss Shopping list:    Having the proper food on hand and ready to go is very important in losing weight.  Everybody has their own preferences/tastes so modify this list as you need but the following are foods that have been found to be helpful in following a calorie restricted diet.   If you " "are a person that doesn't \"like to eat my vegetables\", I recommend making smoothies and throwing the veggies into the  with some fruit and protein powder.      Fruits:  Generally limit to 2-3 whole fruits a day.  Do not buy Juice.  We depend on the fiber and chewing to slow us down and get phytonutrients/antioxidants available in the skins of fruits for health benefits.  Chewing also signals our stomach to send less hunger signals to our brains:    Apples (1-2 daily), Bananas (no more than one full banana a day), Grapes (2 handfuls a day), Clementines/oranges (one daily), berries (blue/rasp/straw:  2 handfuls a day). Watermelon (cubed for easy access, small bowl).    Vegetables:  Eating raw gets most nutrient and fiber benefits but cooked veggies are fine as well, using frozen for cooking or in smoothies is fine as well.  The more chewing/crunchiness the better the hunger control.  No limit to how many a day, but you should at least get 4 handfuls a day or more minimum.  Wash and cut up your vegetables into easy to carry, on the go sizes that are easy to put in plastic bags/pyrex and carry to work/school/etc.  Frozen veggies are just fine as well.     Broccoli, Carrots (no more than 3 a day large carrots or 2 handfuls of baby carrots, as they are very sweet), celery, cucumbers, green peppers, green beans (canned or fresh/frozen), Lettuce(all types), Beets(for roasting, will likely turn urine and stool a bit purple), asparagus.  Go crazy with the veggies, just wash well prior to eating.    Protein:  Women need at least  grams of protein a day and men at least  grams a day, more is fine.  Protein is our \"brain food\" and hunger suppressor and getting enough ensures maintenance of muscle mass during weight loss.  Vegetarians should look to balance their protein intake to get all essential amino acids and discuss with dietician if help is needed (mix of beans/soy/etc).  Protein powders or drinks count " and can be a great way to control appetite during the day and easy to grab and go/carry to work/etc.  Premier Protein, BiPro, TarasWhey are all brands with high quality whey protein. For whey sensitive people, rice protein is an option as well.    Canned tuna/sardines or anchovies.  Fresh fish/salmon the day you plan to make it.  Chicken breasts/thighs (skinless while losing weight), filet mignon steak (leaner but ) or marinade sirloin (wipe off before cooking). Eggs cooked in olive oil for breakfast is a good way to get protein and good fat in the a.m. (cook on low heat to prevent transformation of olive oil into less healthy fat).  Greek Yogurt with at least 20 grams of protein per serving and less than 11 grams of carbs/sugars.  String Cheese  Cottage Cheese: 2% or fat free per your preference.            Example Meal Plan for a 3458-0759 Calorie Diet:    In order to fuel your weight loss properly and avoid hunger-induced overeating later in the day, you will enjoy the most success by following the diet below or similar with adjustments based on your particular tastes and preferences.      I recommend getting into a meal routine and keeping it similar day to day in the beginning so you don t have to think too hard about what you re going to make/eat.  Keep snacks healthy, ideally containing protein.  Non-processed food is preferable to packaged items.  Eat at least a few crunchy green vegetables at snack times, which should be 2-3 hours after your mealtimes(prepare these ahead of time) once or twice daily if very active.  Drink 64 oz -90 oz of water daily.  During a diet we often mistake thirst for hunger or just have some grazing habits we have to break ('bored/mindless eating') and a glass of water and reconsideration of our hunger is often all that is needed.      If you re having hunger problems, add a protein drink to your morning hours or afternoon snack with at least 20grams of protein and not  too much sugar.  A carton of higher protein/low sugar yogurt can work as well.  If the urge to snack is overwhelming and not satiated, try going for a 10 minute walk/exercise, come home and drink a glass of water and if still hungry, have a  calorie snack (handful of raw/sprouted nuts, veggies and string cheese, protein bar, etc).      It is better to have a large breakfast, a moderate lunch and a smaller dinner to fuel your day.  People lose 10-15% more weight during their weight loss season with this strategy.    To make sure you re getting adequate vitamins and minerals during weight loss, I recommend one complete multivitamin a day of your choice.  Consider a probiotic and taking some vitamin D 2000 IU daily.    Let supper be your last meal of the day and ideally try to have at least 12 hours between supper and breakfast the next day to tap into some beneficial overnight fasting dynamics.  Water in the evening is fine, unsweetened herbal teas as well.  Consolidating your meals within a 8-12 hour period of your day will help tap into these additional metabolic benefits and tends to keep your appetite up for breakfast, further helping to stay on track.  For most of my patients I don't recommend an intermittent fasting style diet (many find it hard to fit in their lifestyle) but an overnight fast is very doable for most patients and helps regulate our hunger drives a little better.  This makes it very important to nail good intake at all three meals to feel satisfied/energized and still lose weight.      Example Meal Plan:  Breakfast: 450-475 Calories  1 egg cooked on low in olive oil:   calories.  5oz Greek Yogurt (Fage plain classic: ~150 kanchan)  Handful of Berries of your choice (about a calorie per berry or 20-40cal per handful)    cup(cooked) of  old fashioned oatmeal or 1/2 cup(cooked) steel cut oats. (150 kanchan)  Sprinkle amount of brown sugar and a pat of butter. (40 kanchan)  Glass of  Water  Black  coffee or unsweetened Tea (0calories).      2-3 hours Later Snack: (195 calories).  Glass of water  One string Cheese (80 calories) or 4 oz creamed cottage cheese (115 calories) with  Crunchy Celery sticks (less than 10 calories per large stalk) 2 stalks. (20 calories)    of a  Large Banana or   of a Large Apple (60 calories):  eat second half at lunch or afternoon snack.     Lunch:300 -350 calories   Chicken Breast  (baked/broiled/roasted/grilled)  4-6 oz.  (125-180 kanchan), BBQ sauce/hot sauce/mustard/seasoning is free. Just use a reasonable amount. Or a can of tuna with 1 tablespoon mayonnaise.  Salad: lettuce, any other veggies (cucumbers, green peppers/celery you like and a small drizzle of dressing to just flavor.  Go as big on the veggies as you like,  as they are practically calorie free.   A whole, 8 inch cucumber is 45 calories, a whole green pepper is 23 calories, a stalk of celery is 9 calories.  [place] Dressing is 60 calories per tablespoon..so moderate your desired dressing or do a drizzle of olive oil and splash of balsamic vinegar on top,  Total calories unlikely to be over 150 even with dressing.  Glass of Water.    Option for lunch is meal replacement protein drink/smoothie.  Need at least 20 grams of protein and eat the rest of your apple/banana from the morning snack.      Afternoon Snack: 150-200 calories   Cheese Stick or cottage cheese again  and a fresh fruit OR  Granola Bar (protein Bar acceptable if under 200 calories OR  Homemade smoothies:  8oz skim milk,  a handful of berries (fresh or frozen and a serving of protein powder such as BiPro or Chiqui sWhey for example.  If you don't like dairy, make with 8oz water, one small banana, handful of berries and the protein powder, add any veggies you want as well:  roughly 200 calories.   Glass of Water    Dinner: 325 calories  4oz of fresh, Atlantic salmon.  Broiled (salt/pepper/dill) for about 8-8.5 minutes (200calories) or  4oz filet  "mignon steak or sirloin steak  Salad or vegetable sautéed lightly in olive oil or   Broccoli 1.5  cups chopped and steamed  or micro-waved in a little water (75 calories)  Glass of Water,    Cup of herbal tea (unsweetened, caffeine free)      Herbs and seasonings are free and encouraged to flavor your foods/vegetables.  Make your food delicious.    Tips for Success:  1.  Prepare proteins ahead of time (broil chicken breasts in bulk so you can grab and go), steel cut oats can be stored in casserole dish/bowl in the fridge for quick scoop in the morning and rewarm in microwave, make use of crock pot recipes (watch salt content).  Making meals that cover 3-4 future meals is an easy way to stay on track.  2.  Drink a 8-12 oz glass of water every 2-3 hours when awake.  We often mistake hunger for thirst, especially when losing weight.  3. Remember your Reward and Motivation when things get hard.  4.  Weigh yourself every morning and record, you'll stay on track better and learn how our biorhythms, diet and elimination patterns show up on the scale. Don't worry about 1 or 2 day patterns, but when on track you'll notice good trend downward of weight over 3-4 day segments.  Plateaus tend to resolve after 4-8 days in most cases if you stay consistent with your plan.  These are natural and part of weight loss, even if you're perfect with your plan execution.  5. Call if problems/concerns.  "[employer] is a great tool to stay in touch and provide weekly outside accountability.   6.  Find a handful of meals/foods that keep you on track and feeling good and get into a routine that is sustainable for you.  7.  Take a complete multivitamin just to make sure all micronutrients are adequate during weight loss.  8. If losing hair/brittle nails it usually means you are not taking enough protein.  Minimum goal is 60 grams daily of protein for smaller women, 80 grams a day for men. Consider taking Biotin as supplement or a \"Hair and Nail\" " multivitamin.      MEDICATIONS FOR WEIGHT LOSS        Liraglutide (Victoza/Saxenda):   Part of the family of Glucagon Like Peptide Agonists, liraglutide directly suppresses appetite and is often used by diabetic patients due to decrease liver production of glucose, thereby improving glucose levels.  It also slows how quickly the stomach empties. May be hard to get covered for non diabetics and is an injectable medication delivered via a injector pen. It can be very costly without insurance coverage (over $500/month).  Small risk for pancreatitis and dose should be held if increased mid abdominal pain/burning. It is not to be used if previous Multiple Endocrine Neoplasia. In rodents, may increase risk of thyroid tumors and not indicated for anyone with hx of medullary thyroid cancer as a result.  If changes in voice/swallowing should be discontinued. Reliable birth control required in women.

## 2021-05-30 NOTE — TELEPHONE ENCOUNTER
Refill Approved    Rx renewed per Medication Renewal Policy. Medication was last renewed on 9/27/18.    Kaya Flores, Care Connection Triage/Med Refill 7/25/2019     Requested Prescriptions   Pending Prescriptions Disp Refills     diclofenac sodium (VOLTAREN) 1 % Gel [Pharmacy Med Name: DICLOFENAC 1% GEL 100GM] 300 g 0     Sig: APPLY 2 GRAMS TOPICALLY FOUR TIMES DAILY.       There is no refill protocol information for this order        clotrimazole (LOTRIMIN) 1 % cream [Pharmacy Med Name: CLOTRIMAZOLE 1% CREAM (OTC) 30GM] 30 g 0     Sig: APPLY EXTERNALLY TO THE AFFECTED AREA TWICE DAILY       There is no refill protocol information for this order        potassium chloride (K-DUR,KLOR-CON) 20 MEQ tablet [Pharmacy Med Name: POTASSIUM CL 20MEQ ER TABLETS] 90 tablet 0     Sig: TAKE 1 TABLET BY MOUTH EVERY 24 HOURS       Potassium Supplements Refill Protocol Passed - 7/24/2019  6:05 PM        Passed - PCP or prescribing provider visit in past 12 months       Last office visit with prescriber/PCP: 5/20/2019 Chele Hodge MD OR same dept: 6/4/2019 Tanna Gonsalez MD OR same specialty: 6/4/2019 Tanna Gonsalez MD  Last physical: 12/7/2017 Last MTM visit: Visit date not found   Next visit within 3 mo: Visit date not found  Next physical within 3 mo: Visit date not found  Prescriber OR PCP: Chele Hodge MD  Last diagnosis associated with med order: 1. Primary osteoarthritis involving multiple joints  - diclofenac sodium (VOLTAREN) 1 % Gel [Pharmacy Med Name: DICLOFENAC 1% GEL 100GM]; APPLY 2 GRAMS TOPICALLY FOUR TIMES DAILY.  Dispense: 300 g; Refill: 0    2. Essential hypertension  - potassium chloride (K-DUR,KLOR-CON) 20 MEQ tablet [Pharmacy Med Name: POTASSIUM CL 20MEQ ER TABLETS]; TAKE 1 TABLET BY MOUTH EVERY 24 HOURS  Dispense: 90 tablet; Refill: 0    If protocol passes may refill for 12 months if within 3 months of last provider visit (or a total of 15 months).             Passed - Potassium level in  last 12 months     Lab Results   Component Value Date    Potassium 4.0 05/20/2019

## 2021-05-30 NOTE — PROGRESS NOTES
Medical  Weight Loss Follow-Up Diet Evaluation  Assessment:  Oscar is presenting today for a follow up weight management nutrition consultation. Pt has had an initial appointment with Dr. Saini.  Weight loss medication: other. Metformin.  Pt's Initial Weight: 214 lbs  Weight: 222 lb (100.7 kg)  Weight loss from initial: -8  % Weight loss: -3.74 %    BMI: There is no height or weight on file to calculate BMI.  IBW: 118 lbs    Estimated RMR (Breckinridge-St Jeor equation):  1711 lb   Recommended Protein Intake: 60-80 grams of protein/day  Patient Active Problem List:  Patient Active Problem List   Diagnosis     Major depression     Essential hypertension     Left knee pain     Back pain       Progress on goals from last visit: Pt reports increasing exercise at the [facility]. He reports trying to decrease candy intake.     Dietary Recall:  Wake up time: 7:00pm   -sometimes wakes up in the middle of the night for bowl of cereal  Breakfast: none   Snack:none   Lunch:12pm- 2 eggs, 4 slices toast w/butter , 5 slices zuniga (30g)   Snack: cookies or cake  Dinner: ham and cheese sandwich, chips, soda (20g)   Snack:  sweets  Overnight eating: Yes  Eating out (frequency/week): 3-4x/month   *Pt eats bag of peanut m&m's and full size candy bar 1x/week   Hydration (type/oz. per day):  Water: 4 glasses   Caffeine:1 cup   Carbonation: 1 can   Juice: seldom   Alcohol : none   Exercise:  Routine exercise established: Yes  Pt goes to [facility] and riding bikes.      Nutrition Diagnosis:    NI-1.3)Excessive energy intake related to Not ready for diet/lifestyle change as evidenced by Intake of high caloric density foods/beverages (juice, soda) at meals and/or snacks; large portion; Estimated intake that exceeds estimated daily energy intake; Binge eating patterns; Frequent excessive fast food or restaurant intake; and BMI 40.          Intervention:  1. Recommend calorie/nutrient modification  2. Encouraged pt using motivational interval  techniques.  3. Reviewed nutrition goals and POC.   4. Discussed benefit of decrease candy and pastry intake.       Monitoring/Evaluation:    Goals:  1. Reduce candy and pastry intake to 1 serving/week.   2. Increase activity at [facility].    Follow up:  Pt will follow up in 1 month(s) with bariatrician and PRN with dietitian.     Time spent with patient: 15 minutes  Margareth Torres RD     ABN signed: Yes

## 2021-05-31 VITALS — HEIGHT: 63 IN | WEIGHT: 189 LBS | BODY MASS INDEX: 33.49 KG/M2

## 2021-05-31 VITALS — WEIGHT: 199 LBS | HEIGHT: 63 IN | BODY MASS INDEX: 35.26 KG/M2

## 2021-05-31 NOTE — TELEPHONE ENCOUNTER
Pt did not show for DM appt today. Please contact pt to reschedule. Thanks    So Hill RD, LD  Diabetes Care and Education

## 2021-05-31 NOTE — PROGRESS NOTES
Clinic Care Coordination Contact    Follow Up Progress Note      Assessment: Patient here today to learn how to accurately administer his daily Victoza injection.     Goals addressed this encounter: Goal that patient will be able to understand how to safely administer his Victoza injection by the end of today's education session.      Intervention/Education provided during outreach: Writer went of the directions provided by the  with patient. Patient stated he was more of a visual learning, so writer found a [facility] Video demonstrating how to use the Victoza pen correctly. Patient and writer watched the video together 3 times during the education sessions. Writer then demonstrated how to apply the needle, prime the pen, administer the injection according to the instructions and safely dispose of the needle after dministered the injection. Writer spoke of the importance of rotating sites daily. A diagram of the appropriate injection sites was included in the packaging. Write additionally went over with patient. Patient was able to demonstrate how to give himself his Victoza injection during visit today. Writer when over side affects of the medication and encouraged him to call the Clinic with any questions or concerns. Patient was provided with a printed time line for when he should increase the dose according pharmacy directions. Patient was able to teach a back appropriate administration of Victoza. Goal was met.           Plan: Patient will continue to take Victoza daily as scheduled He will call the Clinic with any questions or concerns.   Care Coordinator will follow up in 1 week.

## 2021-05-31 NOTE — PROGRESS NOTES
Scheduled Follow Up Call: Attempt 1 Community Health Worker called and left a message for the patient. If the patient is returning my call, please transfer the patient to Francisca at ext. [phone].    Next outreach due: 8/6/19

## 2021-05-31 NOTE — TELEPHONE ENCOUNTER
FYI    Patient stopped taking his Victoza due to nausea and not being able to eat.   Reminded patient to always contact the clinic before stopping any medicaion.    Patient has MTM visit next week and DE and PCP visit the following week.

## 2021-05-31 NOTE — PROGRESS NOTES
Diabetes:  Newly diagnosed. Patient was put on Victoza and Metformin, he recently stopped the Victoza due to nausea. Patient was reminded to always call the clinic before stopping medications. Message sent to PCP  Patient stated he doesn't feel like he has diabetes. Reviewed his labs with him and reminded him to keep his follow up visits.       Appointments:  Reminded patient of upcoming appointments and CHW mailed Aug and Sept calendar of appointments.     CADI:  No update regarding ILS or PCA yet    Patient is going to [place] for the weekend and will return on 8/20/19    Next outreach due: 8/30/19

## 2021-05-31 NOTE — TELEPHONE ENCOUNTER
Dr. Hodge,  Referral has been set up for you to review.  Shanae GATICA, CMA/CMT....................1:08 PM

## 2021-05-31 NOTE — TELEPHONE ENCOUNTER
Patient called to reschedule DE appointment. I noticed there is no order placed for DE, even though this is a new consult. Do we need to obtain one? This patient was on So's schedule for this morning, but was not able to make it.

## 2021-05-31 NOTE — TELEPHONE ENCOUNTER
RN cannot approve Refill Request    RN can NOT refill this medication Meloxicam  med is not covered by policy/route to provider and historical medication requested. Last office visit: 5/20/2019 Chele Hodge MD Last Physical: 12/7/2017 Last MTM visit: Visit date not found Last visit same specialty: 6/4/2019 Tanna Gonsalez MD.  Next visit within 3 mo: Visit date not found  Next physical within 3 mo: Visit date not found      Karol Feliz, [facility] Connection Triage/Med Refill 8/10/2019    Requested Prescriptions   Pending Prescriptions Disp Refills     meloxicam (MOBIC) 15 MG tablet [Pharmacy Med Name: MELOXICAM 15MG TABLETS] 90 tablet 3     Sig: TAKE 1 TABLET(15 MG) BY MOUTH DAILY       There is no refill protocol information for this order      Signed Prescriptions Disp Refills    omeprazole (PRILOSEC) 20 MG capsule 90 capsule 3     Sig: TAKE 1 CAPSULE BY MOUTH EVERY DAY 1 HOUR BEFORE A MEAL       GI Medications Refill Protocol Passed - 8/9/2019  9:22 AM        Passed - PCP or prescribing provider visit in last 12 or next 3 months.     Last office visit with prescriber/PCP: 5/20/2019 Chele Hodge MD OR same dept: 6/4/2019 Tanna Gonsalez MD OR same specialty: 6/4/2019 Tanna Gonsalez MD  Last physical: 12/7/2017 Last MTM visit: Visit date not found   Next visit within 3 mo: Visit date not found  Next physical within 3 mo: Visit date not found  Prescriber OR PCP: Chele Hodge MD  Last diagnosis associated with med order: 1. Excessive gas  - omeprazole (PRILOSEC) 20 MG capsule; TAKE 1 CAPSULE BY MOUTH EVERY DAY 1 HOUR BEFORE A MEAL  Dispense: 90 capsule; Refill: 3    2. Back pain  - meloxicam (MOBIC) 15 MG tablet [Pharmacy Med Name: MELOXICAM 15MG TABLETS]; TAKE 1 TABLET(15 MG) BY MOUTH DAILY  Dispense: 90 tablet; Refill: 3    If protocol passes may refill for 12 months if within 3 months of last provider visit (or a total of 15 months).

## 2021-05-31 NOTE — TELEPHONE ENCOUNTER
Refill Approved    Rx renewed per Medication Renewal Policy.     Omeprazole was last renewed on 9/27/18 #90 R-2.    Last OV 6/4/19    Karol Feliz, Care Connection Triage/Med Refill 8/10/2019     Requested Prescriptions   Pending Prescriptions Disp Refills     omeprazole (PRILOSEC) 20 MG capsule [Pharmacy Med Name: OMEPRAZOLE 20MG CAPSULES] 90 capsule 0     Sig: TAKE 1 CAPSULE BY MOUTH EVERY DAY 1 HOUR BEFORE A MEAL       GI Medications Refill Protocol Passed - 8/9/2019  9:22 AM        Passed - PCP or prescribing provider visit in last 12 or next 3 months.     Last office visit with prescriber/PCP: 5/20/2019 Chele Hodge MD OR same dept: 6/4/2019 Tanna Gonsalez MD OR same specialty: 6/4/2019 Tanna Gonsalez MD  Last physical: 12/7/2017 Last MTM visit: Visit date not found   Next visit within 3 mo: Visit date not found  Next physical within 3 mo: Visit date not found  Prescriber OR PCP: Chele Hodge MD  Last diagnosis associated with med order: 1. Excessive gas  - omeprazole (PRILOSEC) 20 MG capsule [Pharmacy Med Name: OMEPRAZOLE 20MG CAPSULES]; TAKE 1 CAPSULE BY MOUTH EVERY DAY 1 HOUR BEFORE A MEAL  Dispense: 90 capsule; Refill: 0    2. Back pain  - meloxicam (MOBIC) 15 MG tablet [Pharmacy Med Name: MELOXICAM 15MG TABLETS]; TAKE 1 TABLET(15 MG) BY MOUTH DAILY  Dispense: 90 tablet; Refill: 0    If protocol passes may refill for 12 months if within 3 months of last provider visit (or a total of 15 months).             meloxicam (MOBIC) 15 MG tablet [Pharmacy Med Name: MELOXICAM 15MG TABLETS] 90 tablet 0     Sig: TAKE 1 TABLET(15 MG) BY MOUTH DAILY       There is no refill protocol information for this order

## 2021-06-01 VITALS — BODY MASS INDEX: 39.2 KG/M2 | WEIGHT: 213 LBS | HEIGHT: 62 IN

## 2021-06-01 VITALS — HEIGHT: 62 IN | WEIGHT: 222 LBS | BODY MASS INDEX: 40.85 KG/M2

## 2021-06-01 VITALS — BODY MASS INDEX: 35.06 KG/M2 | WEIGHT: 190.5 LBS | HEIGHT: 62 IN

## 2021-06-01 VITALS — WEIGHT: 210 LBS | BODY MASS INDEX: 38.64 KG/M2 | HEIGHT: 62 IN

## 2021-06-01 VITALS — WEIGHT: 217.5 LBS | HEIGHT: 62 IN | BODY MASS INDEX: 40.03 KG/M2

## 2021-06-01 VITALS — BODY MASS INDEX: 39.56 KG/M2 | WEIGHT: 215 LBS | HEIGHT: 62 IN

## 2021-06-01 VITALS — HEIGHT: 62 IN | BODY MASS INDEX: 39.38 KG/M2 | WEIGHT: 214 LBS

## 2021-06-01 NOTE — PROGRESS NOTES
Borderline diabetes:  Patient does not check his blood sugars at home, he hasn't been given a meter to do so.  He stopped Victoza because it was making him sick.     In home services:  Patient had a county assessment and states he was denied services, he would like to appeal. CHW scheduled pt to see [facility], he will bring his assessment information with him.     Appointments:  CHW mailed patient Sept calendar of appointments.    Next outreach due:

## 2021-06-01 NOTE — TELEPHONE ENCOUNTER
Dr. Hodge,  Patient states that he was experiencing nausea that he believes was associated with his victoza, so he stopped taking the medication within the first week of starting the medication.  He states that this caused the nausea to subside.      He is still taking metformin.    Patient would also like to increase his dose of methadone as well.  Currently the patient is taking 35 mg of methadone every evening.    Please advise.  Thank you.  Shanae GATICA CMA/LOLIS....................10:55 AM

## 2021-06-01 NOTE — PROGRESS NOTES
Clinic Care Coordination Contact    Follow Up Progress Note      Assessment: BEKAH met with Oscar to discuss CADI. SW contacted Dwight Cooper  to clarify his status. She reported he was denied CADI due to having supports in place such as stable housing and ARMHS. Oscar would like to pursue an appeal. BEKAH wrote a letter requesting an appeal and faxed, explained that he may be out of the appeal timeframe. Asked to him to update CCC team once he hears from them.    Assisted Oscar in emailing his life insurance company requesting a refund of his last premium and requested a confirmation of his cancellation.    Oscar expressed concerns about his medications; one for his knee, sertraline, and victoza. He also reported he would like a knee brace and a referral to psychiatry and psychology. BEKAH consulted with PCP CA of these concerns for his upcoming appointment today, 9/18/2019.         Goals addressed this encounter:   Goals Addressed                 This Visit's Progress      I would like to have in-home services, like a PCA/ILS worker to assist me with maintaining my apartment in the next 4 months. (pt-stated)        Action steps to achieve this goal-Appealing CADI decision    1.  I will let my CHW know what the appeal response is that was submitted on 9/18/19  2.  I will continue to notify CCC if I experience any difficulty or need support through this process.    Date goal set:  5/1/19  Updated: 9/18/19 BC             Intervention/Education provided during outreach: Sent appeal for CADI waiver, emailed life insurance for refund and cancellation confirmation, discussed with CA about medication concerns           Plan:   Regular Outreach

## 2021-06-01 NOTE — TELEPHONE ENCOUNTER
CC:  Nausea       Wanted to let provider know that he was experiencing nausea that he had asso with Victoza and stopped taking within the 1st week or so        Since DC'ing, the nausea has resolved      He is still cont with Metformin      Was taking both of these for weight loss     Only other issue he wanted to report was that he would like he methadone dose increased       A/P:    > Will message provider to let them know at your request         Pt tele# [phone]       Daren Turk RN   Triage and Medication Refills        Reason for Disposition    Caller has NON-URGENT medication question about med that PCP prescribed and triager unable to answer question    Protocols used: MEDICATION QUESTION CALL-A-

## 2021-06-01 NOTE — PROGRESS NOTES
Appointments:  CHW mailed calendar of October appointments within the [facility] system.     GED:  Patient states that his instructor at [employer] has left and he has not met with her replacement. He has received the resources that were mailed to him from [facility].     [employer]:  Patient is waiting to get a card for silver sneakers in the mail. He completed an application with [facility]    Waiver services:  Still waiting for response from the [facility] about getting a new assessment     Next outreach due: 10/14/19

## 2021-06-01 NOTE — TELEPHONE ENCOUNTER
Left message to call back for: lab results  Information to relay to patient: Abnormal kidney function due to your long-standing diabetes.  Please see me for this.     Bit improved diabetes control, A1c of 7.8.  Please continue same medications.  Thank you.      Jaci Madrid LPN

## 2021-06-01 NOTE — TELEPHONE ENCOUNTER
FYI - Status Update  Who is Calling: Patient  Update: patient is going to be late for 10-15 minutes  today's appointment.    Okay to leave a detailed message?:  No return call needed

## 2021-06-01 NOTE — PROGRESS NOTES
Clinic Care Coordination Contact  Care Team Conversations     SW received message from CA reporting Oscar forgot to ask about GED resources. BEKAH found 10 resources near Oscar's home and will mail them to him.

## 2021-06-01 NOTE — TELEPHONE ENCOUNTER
Robb,    We saw Oscar today and he mention he was wanting to work on his GED again. He was telling me that he was going to a program with a [employer] near his home and was taking reading and wanting to start Math to complete his GED. He was unable to continue the program as he was late to many times and was not able to attend any longer. He is wondering if you can give him more resources or information on where he take classes again to finish his GED. He shared with me that his ultimate goal would be to get into trade work to make some extra money. He apologized for not remembering to share this with you earlier today. He also mention he is concerned about a school loan that in [place] that has defaulted and it may effect him to get grants with schooling.       Thank you, and let me know if you need anything else from myself or Dr. Hodge,     Jaci Madrid LPN

## 2021-06-01 NOTE — TELEPHONE ENCOUNTER
Spoke with the patient and relayed message below from Dr. Hodge.  He verbalized understanding and had no further questions at this time.  Patient is scheduled to see Dr. Hodge tomorrow afternoon at 2:20 pm.  Shanae GATICA CMA/LOLIS....................12:00 PM

## 2021-06-01 NOTE — TELEPHONE ENCOUNTER
I don't handle methadone. He should go to the methadone clinic for this. Needs to see me for his diabetes.

## 2021-06-01 NOTE — PROGRESS NOTES
Office Visit - Physical Exam   Oscar Mojica   59 y.o. male    Date of Visit: 9/18/2019    Chief Complaint   Patient presents with     Annual Exam     not fasting, wants psych referral for mental health med management. Needs alt to Voltaren Gel as it is not covered by insurance. Victoza making him nauseous and wants to discuss alt.     Flu Vaccine        Assessment and Plan   1. Routine general medical examination at a health care facility  Informed his physical exam is basically normal except for his increased blood pressure, uncontrolled diabetes and obesity..    2. Essential hypertension  Not controlled.  Continue amlodipine and hydrochlorothiazide but add losartan 100 mg daily.  Will follow him in 6 weeks for his increased blood pressure.  - losartan (COZAAR) 100 MG tablet; Take 1 tablet (100 mg total) by mouth daily.  Dispense: 90 tablet; Refill: 3    3. Type 2 diabetes mellitus without complication, without long-term current use of insulin (H)  Not controlled.  A1c was 8.2 on 7/23/2019.  Stopped taking Victoza because he could not tolerate it, making him nauseated when he gets it.  Advised to increase [id number] mg to 2 tablets twice a day instead.  Seems to tolerate metformin well.  Foot exam is normal including monofilament test.  Not fasting today.  Will check his fasting labs next week  as lab appointment.  Referred to ophthalmology for diabetic eye exam.  - metFORMIN (GLUCOPHAGE) 500 MG tablet; Take 2 tablets twice a day.  Dispense: 180 tablet; Refill: 0  - Ambulatory referral to Ophthalmology    4. Mixed hyperlipidemia  Basically controlled except for increased triglycerides because of ankle diabetes.  LDL was 98 and total cholesterol 198 but triglyceride was 318.  Not fasting today.  Will check fasting labs next week as lab appointment.  Continue fenofibrate.    5. Current moderate episode of major depressive disorder without prior episode (H)  Has mild to moderate depression.  Stopped taking  bupropion and sertraline because of side effects specifically nausea and diarrhea.  PHQ 9 score of 11 suggests he has still mild depression today.  Will refer to mental health unit for psychotherapy.  May be able to resolve his depression by psychotherapy alone.    - Ambulatory referral to Psychology    6. Onychomycosis of toenail  Has onychomycosis on his toenails.  Wants this treated.  Will refer to podiatry.  - Ambulatory referral to Podiatry    7. JANE (obstructive sleep apnea)  Has JANE.  Compliant using CPAP.  But he states he does not sleep well.    8. Morbid obesity (H)  Morbidly obese.  Encouraged to lose weight.    9. Need for immunization against influenza  Flu shot was given.  - Influenza, Recombinant, Inj, Quadrivalent, PF, 18+YRS    10. Screen for colon cancer  Due for colon cancer screening.  - Ambulatory referral for Colonoscopy    The following high BMI interventions were performed this visit: encouragement to exercise, weight monitoring and prescribed dietary intake     Continue to follow with methadone clinic for his history of substance abuse and chronic pains      Follow up in 6 weeks.     History of Present Illness   This 59 y.o. old male is here for his comprehensive physical exam.  Has diabetes not controlled because stopped taking his because also.  Only takes metformin at a low dose of 500 mg daily.  Blood sugar and A1c are increased.  Has hypertension.  Not controlled by amlodipine hydrochlorothiazide  combination.  Needs additional blood pressure medication.  Complains of blackish discoloration of his toenails consistent with onychomycosis.  Lipids are controlled except for triglycerides due to uncontrolled diabetes.  Takes fenofibrate.  Has JANE.  Reports he is compliant using CPAP nightly.  But states he does not sleep well.  Obese.  Due for his eye exam.  Denies hearing problems.  Denies chest pains and shortness of breath.  But he gets easily tired because of deconditioning.  Denies  urinary and bowel symptoms.  Denies aches and pains.  Not sexually active.    Review of Systems   A 12 point comprehensive review of systems was negative except as noted..     Medications, Allergies and Problem List   Reviewed and updated             Chief Complaint   Annual Exam (not fasting, wants psych referral for mental health med management. Needs alt to Voltaren Gel as it is not covered by insurance. Victoza making him nauseous and wants to discuss alt.) and Flu Vaccine       Patient Profile   Social History     Social History Narrative    Single. No children. Under disability. Current cigarette smoker, 1/2 ppd. Non alcohol drinker.         Past Medical History   Patient Active Problem List   Diagnosis     Major depression     Essential hypertension     Left knee pain     Back pain     Mixed hyperlipidemia     Type 2 diabetes mellitus without complication, without long-term current use of insulin (H)     Morbid obesity (H)       Past Surgical History  He has no past surgical history on file.       Medications and Allergies   Current Outpatient Medications   Medication Sig     amLODIPine (NORVASC) 5 MG tablet Take 1 tablet (5 mg total) by mouth daily.     calcium, as carbonate, (TUMS) 200 mg calcium (500 mg) chewable tablet Chew 2 tablets as needed. Acid reflux     fenofibrate (TRICOR) 48 MG tablet TAKE 1 TABLET(48 MG) BY MOUTH DAILY     hydroCHLOROthiazide (HYDRODIURIL) 25 MG tablet Take 1 tablet (25 mg total) by mouth daily.     meloxicam (MOBIC) 15 MG tablet TAKE 1 TABLET(15 MG) BY MOUTH DAILY     metFORMIN (GLUCOPHAGE) 500 MG tablet Take 2 tablets twice a day.     methadone (DOLOPHINE) 10 mg/5 mL solution Take 18 mg by mouth every morning.            multivitamin therapeutic tablet Take 1 tablet by mouth daily.     omeprazole (PRILOSEC) 20 MG capsule TAKE 1 CAPSULE BY MOUTH EVERY DAY 1 HOUR BEFORE A MEAL     polyethylene glycol (MIRALAX) 17 gram packet Take 1 packet (17 g total) by mouth daily.      "potassium chloride (K-DUR,KLOR-CON) 20 MEQ tablet TAKE 1 TABLET BY MOUTH EVERY 24 HOURS     cholecalciferol, vitamin D3, (VITAMIN D3) 5,000 unit Tab Once daily     losartan (COZAAR) 100 MG tablet Take 1 tablet (100 mg total) by mouth daily.     pen needle, diabetic (BD TONY 2ND GEN PEN NEEDLE) 32 gauge x 5/32\" Ndle For use w/ liraglutide.     Allergies   Allergen Reactions     Hay Fever And Allergy Relief      Penicillins Nausea Only        Family and Social History   No family history on file.     Social History     Tobacco Use     Smoking status: Current Every Day Smoker     Packs/day: 0.50     Types: Cigarettes     Smokeless tobacco: Never Used   Substance Use Topics     Alcohol use: No     Comment: 9 months sober.     Drug use: No        Physical Exam       Physical Exam  /70   Pulse 79   Ht 5' 1.5\" (1.562 m)   Wt 214 lb (97.1 kg)   SpO2 97%   BMI 39.78 kg/m        General Appearance:    Alert, cooperative, no distress, appears stated age, obese   Head:    Normocephalic, without obvious abnormality, atraumatic   Eyes:    PERRL, conjunctiva/corneas clear, EOM's intact, fundi     benign, both eyes        Ears:    Normal TM's and external ear canals, both ears   Nose:   Nares normal, septum midline, mucosa normal, no drainage    or sinus tenderness   Throat:   Lips, mucosa, and tongue normal; teeth and gums normal   Neck:   Supple, symmetrical, trachea midline, no adenopathy;        thyroid:  No enlargement/tenderness/nodules; no carotid    bruit or JVD   Back:     Symmetric, no curvature, ROM normal, no CVA tenderness   Lungs:     Clear to auscultation bilaterally, respirations unlabored   Chest wall:    No tenderness or deformity   Heart:    Regular rate and rhythm, S1 and S2 normal, no murmur, rub   or gallop   Abdomen:     Soft, non-tender, bowel sounds active all four quadrants,     no masses, no organomegaly   Genitalia:    Normal male without lesion, discharge or tenderness,    circumcised " "  Rectal:    Normal tone, normal prostate, no masses or tenderness   Extremities:   Extremities normal, atraumatic, no cyanosis or edema   Pulses:   2+ and symmetric all extremities   Skin:   Skin color, texture, turgor normal, no rashes or lesions   Lymph nodes:   Cervical, supraclavicular, and axillary nodes normal   Neurologic:   CNII-XII intact. Normal strength, sensation and reflexes       throughout        Additional Information        Chele Hodge MD  Internal Medicine  Contact me at [phone]     Additional Information   Current Outpatient Medications   Medication Sig     amLODIPine (NORVASC) 5 MG tablet Take 1 tablet (5 mg total) by mouth daily.     calcium, as carbonate, (TUMS) 200 mg calcium (500 mg) chewable tablet Chew 2 tablets as needed. Acid reflux     fenofibrate (TRICOR) 48 MG tablet TAKE 1 TABLET(48 MG) BY MOUTH DAILY     hydroCHLOROthiazide (HYDRODIURIL) 25 MG tablet Take 1 tablet (25 mg total) by mouth daily.     meloxicam (MOBIC) 15 MG tablet TAKE 1 TABLET(15 MG) BY MOUTH DAILY     metFORMIN (GLUCOPHAGE) 500 MG tablet Take 2 tablets twice a day.     methadone (DOLOPHINE) 10 mg/5 mL solution Take 18 mg by mouth every morning.            multivitamin therapeutic tablet Take 1 tablet by mouth daily.     omeprazole (PRILOSEC) 20 MG capsule TAKE 1 CAPSULE BY MOUTH EVERY DAY 1 HOUR BEFORE A MEAL     polyethylene glycol (MIRALAX) 17 gram packet Take 1 packet (17 g total) by mouth daily.     potassium chloride (K-DUR,KLOR-CON) 20 MEQ tablet TAKE 1 TABLET BY MOUTH EVERY 24 HOURS     cholecalciferol, vitamin D3, (VITAMIN D3) 5,000 unit Tab Once daily     losartan (COZAAR) 100 MG tablet Take 1 tablet (100 mg total) by mouth daily.     pen needle, diabetic (BD TONY 2ND GEN PEN NEEDLE) 32 gauge x 5/32\" Ndle For use w/ liraglutide.     Allergies   Allergen Reactions     Hay Fever And Allergy Relief      Penicillins Nausea Only     Social History     Tobacco Use     Smoking status: Current Every Day " Smoker     Packs/day: 0.50     Types: Cigarettes     Smokeless tobacco: Never Used   Substance Use Topics     Alcohol use: No     Comment: 9 months sober.     Drug use: No

## 2021-06-02 VITALS — BODY MASS INDEX: 41.59 KG/M2 | WEIGHT: 226 LBS | HEIGHT: 62 IN

## 2021-06-02 VITALS — BODY MASS INDEX: 40.3 KG/M2 | HEIGHT: 62 IN | WEIGHT: 219 LBS

## 2021-06-02 VITALS — HEIGHT: 62 IN | WEIGHT: 226 LBS | BODY MASS INDEX: 41.59 KG/M2

## 2021-06-02 VITALS — WEIGHT: 221 LBS | HEIGHT: 62 IN | BODY MASS INDEX: 40.67 KG/M2

## 2021-06-02 VITALS — WEIGHT: 230 LBS | HEIGHT: 62 IN | BODY MASS INDEX: 42.33 KG/M2

## 2021-06-02 VITALS — BODY MASS INDEX: 41.22 KG/M2 | WEIGHT: 224 LBS | HEIGHT: 62 IN

## 2021-06-02 VITALS — WEIGHT: 220 LBS | BODY MASS INDEX: 40.24 KG/M2

## 2021-06-02 VITALS — BODY MASS INDEX: 39.93 KG/M2 | WEIGHT: 217 LBS | HEIGHT: 62 IN

## 2021-06-02 VITALS — BODY MASS INDEX: 41.04 KG/M2 | HEIGHT: 62 IN | WEIGHT: 223 LBS

## 2021-06-02 VITALS — WEIGHT: 222.5 LBS | HEIGHT: 62 IN | BODY MASS INDEX: 40.94 KG/M2

## 2021-06-02 VITALS — HEIGHT: 62 IN | WEIGHT: 217 LBS | BODY MASS INDEX: 39.93 KG/M2

## 2021-06-02 VITALS — WEIGHT: 220.1 LBS | BODY MASS INDEX: 40.26 KG/M2

## 2021-06-02 NOTE — PROGRESS NOTES
Clinic Care Coordination Contact  Care Team Conversations     SW received voicemail from the Appeals Office. They reported they needed to mail CCC SW a document because the letter was written on behalf of Oscar. They said they received the letter back due to wrong address.    SW left voicemail with clinic address and also clarified that SW has written a letter in the past to help patients initiate their appeal request and appeals followed up with patient. SW would like them to follow up with the patient regarding the appeal, but also provided contact information for SW to call back if more information is needed.

## 2021-06-02 NOTE — PROGRESS NOTES
Clinic Care Coordination Contact    Situation: Patient chart reviewed by care coordinator.    Background: Patient currently has goals related to appointment reminders, Silver Sneakers through the [employer], and in-home services.     Assessment: Patient continue to received reminder calls from the [facility] CHW regarding appointments. [facility] CHW also has been mailing him a calendar each month with his scheduled appointments. Patient completed application for [employer] membership and is currently wailing for his membership card. Patient is working with the [facility] MSW on the appeals process through [employer] related to his desire to have in-home services.     Plan/Recommendations: CCC RN will continue to monitor and will be available as nursing needs arise.

## 2021-06-02 NOTE — PROGRESS NOTES
Scheduled Follow Up Call: Attempt 1 Community Health Worker called and left a message for the patient. If the patient is returning my call, please transfer the patient to Francisca at ext. [phone].    Next outreach due: 10/28/19

## 2021-06-02 NOTE — PROGRESS NOTES
Office Visit - Follow Up   Oscar Mojica   59 y.o. male    Date of Visit: 10/30/2019    Chief Complaint   Patient presents with     Follow-up     has lost weight, riding his bike more. He wants to speak with SW to discuss appt with psych and [facility], and needs a new cadi waver     Knee Pain     wants pain relieving gel for knee pain         Assessment and Plan   1. Type 2 diabetes mellitus without complication, without long-term current use of insulin (H)  Not ideally controlled but acceptable.  Has A1c of 7.8 on 9/27/2019.  Takes metformin.  Has lost 15 pounds since last visit because he now bikes daily for 2 hours.  Also has been eating more healthier food.  Suspect his A1c would be much better controlled now.    2. Mixed hyperlipidemia  Controlled.  Lipids on 5/20/2019 were good LDL 98 total cholesterol 190 but high triglyceride 310 because of uncontrolled blood sugar then.  Continue fenofibrate.  Will check fasting lipids next visit.    3. Essential hypertension  Now controlled with addition of losartan 100 mg daily to hydrochlorothiazide 25 mg daily and amlodipine 5 mg daily.    4. Current moderate episode of major depressive disorder without prior episode (H)  Has major depression. Last PHQ 9 score was 11 consistent with mild depression.  Does not take antidepressant at this time.  Wants to see psychiatry again.  Will refer to psychiatry.  - Ambulatory referral to Psychiatry    5. Chemical abuse (H)  Followed by the methadone clinic for his chemical abuse.  Takes 30 mg of methadone daily.  Seen at the methadone clinic weekly.  Methadone is given for his previous history of chemical abuse.  Used to abuse heroin and graduated to methamphetamine.  Sober now.    6. Obstructive sleep apnea syndrome  Sleeping better because he is compliant using his CPAP.    7. Chronic pain of left knee  Has chronic  left knee pains due to osteoarthritis.  Wants to get knee brace.  Was prescribed knee brace.    8.  Morbid obesity (H)  Obese patient has BMI of 36.9.  But has lost some weight since he started exercising regularly and eating healthy.    9. Special screening for malignant neoplasms, colon  Due for preventive colon cancer screening.  Will do colonoscopy since he agrees.  - Ambulatory referral for Colonoscopy    The following high BMI interventions were performed this visit: encouragement to exercise    I provided  him with my order to attend or participate [employer] program at the [facility].  Hopefully having this prescription, he can participate this exercise for free or without cost.  Needs to exercise because of his morbid obesity and other medical problems including diabetes, hypertension, hyperlipidemia and osteoarthritis of the knee.    Follow up in 3 months.  Will fast next visit.     History of Present Illness   This 59 y.o. old male is here for follow-up.  Has diabetes.  Not ideally controlled but has acceptable A1c of 7.8.  Has lost weight because he exercises almost daily daily by biking and eating healthier diet now.  In fact he lost 15 pounds since his last visit.  Has hypertension.  Not controlled by addition of losartan to his amlodipine and hydrochlorothiazide.  Still has hypertriglyceridemia but improved.  Takes fenofibrate.  Needs repeat fasting lipids next visit.  Found to have JANE recently.  Subsequently confirmed by sleeping study.  Compliant using his CPAP so he sleeps better.    Review of Systems   A 12 point comprehensive review of systems was negative except as noted..     Medications, Allergies and Problem List   Reviewed and updated             Chief Complaint   Follow-up (has lost weight, riding his bike more. He wants to speak with SW to discuss appt with psych and [email or web address] [facility], and needs a new cadi waver) and Knee Pain (wants pain relieving gel for knee pain )       Patient Profile   Social History     Patient does not qualify to have social determinant information on file  "(likely too young).   Social History Narrative    Single. No children. Under disability. Current cigarette smoker, 1/2 ppd. Non alcohol drinker.         Past Medical History   Patient Active Problem List   Diagnosis     Major depression     Essential hypertension     Left knee pain     Back pain     Mixed hyperlipidemia     Type 2 diabetes mellitus without complication, without long-term current use of insulin (H)     Morbid obesity (H)     Obstructive sleep apnea syndrome     Chemical abuse (H)       Past Surgical History  He has no past surgical history on file.       Medications and Allergies   Current Outpatient Medications   Medication Sig     amLODIPine (NORVASC) 5 MG tablet Take 1 tablet (5 mg total) by mouth daily.     calcium, as carbonate, (TUMS) 200 mg calcium (500 mg) chewable tablet Chew 2 tablets as needed. Acid reflux     cholecalciferol, vitamin D3, (VITAMIN D3) 5,000 unit Tab Once daily     cyanocobalamin 1000 MCG tablet Take 1,000 mcg by mouth daily.     fenofibrate (TRICOR) 48 MG tablet TAKE 1 TABLET(48 MG) BY MOUTH DAILY     hydroCHLOROthiazide (HYDRODIURIL) 25 MG tablet Take 1 tablet (25 mg total) by mouth daily.     losartan (COZAAR) 100 MG tablet Take 1 tablet (100 mg total) by mouth daily.     meloxicam (MOBIC) 15 MG tablet TAKE 1 TABLET(15 MG) BY MOUTH DAILY     metFORMIN (GLUCOPHAGE) 500 MG tablet Take 2 tablets twice a day.     methadone (DOLOPHINE) 10 mg/5 mL solution Take 35 mg by mouth every morning.            multivitamin therapeutic tablet Take 1 tablet by mouth daily.     omeprazole (PRILOSEC) 20 MG capsule TAKE 1 CAPSULE BY MOUTH EVERY DAY 1 HOUR BEFORE A MEAL     pen needle, diabetic (BD TONY 2ND GEN PEN NEEDLE) 32 gauge x 5/32\" Ndle For use w/ liraglutide.     polyethylene glycol (MIRALAX) 17 gram packet Take 1 packet (17 g total) by mouth daily.     potassium chloride (K-DUR,KLOR-CON) 20 MEQ tablet TAKE 1 TABLET BY MOUTH EVERY 24 HOURS     Allergies   Allergen Reactions     Hay " "Fever And Allergy Relief      Penicillins Nausea Only        Family and Social History   No family history on file.     Social History     Tobacco Use     Smoking status: Current Every Day Smoker     Packs/day: 0.50     Types: Cigarettes     Smokeless tobacco: Never Used   Substance Use Topics     Alcohol use: No     Comment: 9 months sober.     Drug use: No        Physical Exam       Physical Exam  /70   Pulse 83   Ht 5' 1.5\" (1.562 m)   Wt 199 lb (90.3 kg)   SpO2 96%   BMI 36.99 kg/m    General appearance: alert, appears stated age, cooperative, no distress and moderately obese  Head: Normocephalic, without obvious abnormality, atraumatic  Throat: lips, mucosa, and tongue normal; teeth and gums normal  Neck: no adenopathy, no carotid bruit, no JVD, supple, symmetrical, trachea midline and thyroid not enlarged, symmetric, no tenderness/mass/nodules  Lungs: clear to auscultation bilaterally  Heart: regular rate and rhythm, S1, S2 normal, no murmur, click, rub or gallop  Abdomen: soft, non-tender; bowel sounds normal; no masses,  no organomegaly  Extremities: extremities normal, atraumatic, no cyanosis or edema  Skin: Skin color, texture, turgor normal. No rashes or lesions  Neurologic: Grossly normal     Additional Information        Chele Hodge MD  Internal Medicine  Contact me at [phone]     Additional Information   Current Outpatient Medications   Medication Sig     amLODIPine (NORVASC) 5 MG tablet Take 1 tablet (5 mg total) by mouth daily.     calcium, as carbonate, (TUMS) 200 mg calcium (500 mg) chewable tablet Chew 2 tablets as needed. Acid reflux     cholecalciferol, vitamin D3, (VITAMIN D3) 5,000 unit Tab Once daily     cyanocobalamin 1000 MCG tablet Take 1,000 mcg by mouth daily.     fenofibrate (TRICOR) 48 MG tablet TAKE 1 TABLET(48 MG) BY MOUTH DAILY     hydroCHLOROthiazide (HYDRODIURIL) 25 MG tablet Take 1 tablet (25 mg total) by mouth daily.     losartan (COZAAR) 100 MG tablet " "Take 1 tablet (100 mg total) by mouth daily.     meloxicam (MOBIC) 15 MG tablet TAKE 1 TABLET(15 MG) BY MOUTH DAILY     metFORMIN (GLUCOPHAGE) 500 MG tablet Take 2 tablets twice a day.     methadone (DOLOPHINE) 10 mg/5 mL solution Take 35 mg by mouth every morning.            multivitamin therapeutic tablet Take 1 tablet by mouth daily.     omeprazole (PRILOSEC) 20 MG capsule TAKE 1 CAPSULE BY MOUTH EVERY DAY 1 HOUR BEFORE A MEAL     pen needle, diabetic (BD TONY 2ND GEN PEN NEEDLE) 32 gauge x 5/32\" Ndle For use w/ liraglutide.     polyethylene glycol (MIRALAX) 17 gram packet Take 1 packet (17 g total) by mouth daily.     potassium chloride (K-DUR,KLOR-CON) 20 MEQ tablet TAKE 1 TABLET BY MOUTH EVERY 24 HOURS     Allergies   Allergen Reactions     Hay Fever And Allergy Relief      Penicillins Nausea Only     Social History     Tobacco Use     Smoking status: Current Every Day Smoker     Packs/day: 0.50     Types: Cigarettes     Smokeless tobacco: Never Used   Substance Use Topics     Alcohol use: No     Comment: 9 months sober.     Drug use: No         Time: total time spent with the patient was 25 minutes of which >50% was spent in counseling and coordination of care     "

## 2021-06-03 VITALS — BODY MASS INDEX: 40.85 KG/M2 | HEIGHT: 62 IN | WEIGHT: 222 LBS

## 2021-06-03 VITALS
HEIGHT: 62 IN | WEIGHT: 199 LBS | BODY MASS INDEX: 36.62 KG/M2 | DIASTOLIC BLOOD PRESSURE: 70 MMHG | OXYGEN SATURATION: 96 % | HEART RATE: 83 BPM | SYSTOLIC BLOOD PRESSURE: 108 MMHG

## 2021-06-03 VITALS — BODY MASS INDEX: 40.85 KG/M2 | WEIGHT: 222 LBS | HEIGHT: 62 IN

## 2021-06-03 VITALS
BODY MASS INDEX: 39.38 KG/M2 | HEART RATE: 79 BPM | DIASTOLIC BLOOD PRESSURE: 70 MMHG | SYSTOLIC BLOOD PRESSURE: 160 MMHG | OXYGEN SATURATION: 97 % | HEIGHT: 62 IN | WEIGHT: 214 LBS

## 2021-06-03 NOTE — PROGRESS NOTES
Scheduled Follow Up Call: Attempt 1 Community Health Worker called and left a message for the patient. If the patient is returning my call, please transfer the patient to Francisca at ext. [phone].    Next outreach due: 11/27/19

## 2021-06-03 NOTE — PROGRESS NOTES
Patient has not yet completed an application for [employer] scholarship. Scheduled patient to meet with [facility] to complete.   He will also bring in his correspondence that he recently received from the [facility].     Next outreach due: 11/20/19

## 2021-06-04 VITALS
WEIGHT: 185 LBS | BODY MASS INDEX: 34.93 KG/M2 | OXYGEN SATURATION: 97 % | DIASTOLIC BLOOD PRESSURE: 80 MMHG | HEART RATE: 74 BPM | SYSTOLIC BLOOD PRESSURE: 152 MMHG | HEIGHT: 61 IN

## 2021-06-04 VITALS
WEIGHT: 186 LBS | DIASTOLIC BLOOD PRESSURE: 74 MMHG | OXYGEN SATURATION: 95 % | HEART RATE: 79 BPM | SYSTOLIC BLOOD PRESSURE: 162 MMHG | BODY MASS INDEX: 35.14 KG/M2

## 2021-06-04 NOTE — PROGRESS NOTES
Scheduled Follow Up Call: Attempt 2 Community Health Worker called and left a message for the patient. If the patient is returning my call, please transfer the patient to Francisca at ext. [phone].   I have called the patient 2 times I the past month and have been unsuccessful in reaching him/her. The patient has not returned any of my messages. I have mailed a letter to the patient requesting a return call and will continue attempting to reach out to the patient in one month. I will also check the patient's chart for upcoming appointments, ER reports that may contain a new phone number, or any other recent activity.    Next outreach due: 12/30/19

## 2021-06-04 NOTE — PROGRESS NOTES
Scheduled Follow Up Call: Attempt 1 Community Health Worker called and left a message for the patient. If the patient is returning my call, please transfer the patient to Francisca at ext. [phone].    Next outreach dueL 1/8/20

## 2021-06-04 NOTE — PROGRESS NOTES
Clinic Care Coordination Contact    Follow Up Progress Note      Assessment: Attempted to follow-up with patient regarding recent ED visit, but was only able to leave a message. CCC RN will continue to try to connect with patient to ensure needs and concerns are being addressed.

## 2021-06-04 NOTE — TELEPHONE ENCOUNTER
Refill Approved    Rx renewed per Medication Renewal Policy. Medication was last renewed on 11/29/18    Carla Moses, [facility] Connection Triage/Med Refill 12/8/2019     Requested Prescriptions   Pending Prescriptions Disp Refills     hydroCHLOROthiazide (HYDRODIURIL) 25 MG tablet [Pharmacy Med Name: HYDROCHLOROTHIAZIDE 25MG TABLETS] 90 tablet 0     Sig: TAKE 1 TABLET(25 MG) BY MOUTH DAILY       Diuretics/Combination Diuretics Refill Protocol  Passed - 12/7/2019  5:09 AM        Passed - Visit with PCP or prescribing provider visit in past 12 months     Last office visit with prescriber/PCP: Visit date not found OR same dept: 10/30/2019 Chele Hodge MD OR same specialty: 10/30/2019 Chele Hodge MD  Last physical: Visit date not found Last MTM visit: Visit date not found   Next visit within 3 mo: Visit date not found  Next physical within 3 mo: Visit date not found  Prescriber OR PCP: Luis Gill MD  Last diagnosis associated with med order: There are no diagnoses linked to this encounter.  If protocol passes may refill for 12 months if within 3 months of last provider visit (or a total of 15 months).             Passed - Serum Potassium in past 12 months      Lab Results   Component Value Date    Potassium 4.1 09/27/2019             Passed - Serum Sodium in past 12 months      Lab Results   Component Value Date    Sodium 132 (L) 09/27/2019             Passed - Blood pressure on file in past 12 months     BP Readings from Last 1 Encounters:   10/30/19 108/70             Passed - Serum Creatinine in past 12 months      Creatinine   Date Value Ref Range Status   09/27/2019 1.52 (H) 0.70 - 1.30 mg/dL Final

## 2021-06-05 VITALS
HEART RATE: 74 BPM | WEIGHT: 216 LBS | BODY MASS INDEX: 40.81 KG/M2 | TEMPERATURE: 99.1 F | DIASTOLIC BLOOD PRESSURE: 62 MMHG | SYSTOLIC BLOOD PRESSURE: 122 MMHG | OXYGEN SATURATION: 96 %

## 2021-06-05 NOTE — TELEPHONE ENCOUNTER
Who is calling:  Patient   Reason for Call:  Patient called inquiring about a [employer] waiver & getting a PCA.  Date of last appointment with primary care: 10/30/19  Okay to leave a detailed message: Yes
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
## 2021-06-05 NOTE — PROGRESS NOTES
Patient states he has been going through some significant depression lately. He admits to suicidal thoughts but states he believes in God and therefore would never act on those thoughts. Discussed that if his thought change he needs to call 911 or go the ER. CHW discussed with CCC RN.    He would like to move out of his current housing, it isn't safe and it is causing his worsening depression. CHW scheduled patient to see CCC SW before his appointment with PCP.      Next outreach due: 2/20/2020
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
## 2021-06-06 NOTE — PROGRESS NOTES
Clinic Care Coordination Contact    Follow Up Progress Note      Assessment:     Goals addressed this encounter:   Goals Addressed                 This Visit's Progress       General      I would like assistance remembering to attend my scheduled  medical appointments.  (pt-stated)   On track     Action steps to achieve this goal    1.  I will speak with the [facility] Care Guide when she calls to assist me with reminders for my appointments and write them on the calendar.   2.  I will display my appointment calendars that my care guide sends me so I know when my appointments are.   3.  I will attend all appointments scheduled with the Clinic and Specialty appointments. If there is a scheduling conflict I will call and reschedule the appointment.     Date goal set:  5/1/19        I would like to have in-home services, like a PCA/ILS worker to assist me with maintaining my apartment in the next 4 months. (pt-stated)   Not on track     Action steps to achieve this goal-    1.  I will wait to hear if I can have a new MNChoices Assessment      Date goal set:  5/1/19  Updated: 9/18/19 BC  Updated: 2/12/2020 BC             Intervention/Education provided during outreach:  Discussed with patient his $15 bill with the [facility] which is his responsibility. It is from last summer. His membership was $35 but he was attending 12+ times a month so his insurance reimbursed $20 a month. He still owes $15 for his last month.        Plan:   Continue sobriety, he is going to call his [facility] worker today about being taken off suspension for missing too many appointments.    Care Coordinator will follow up in 3/18/2020

## 2021-06-06 NOTE — PROGRESS NOTES
Office Visit - Follow Up   Oscar Mojica   59 y.o. male    Date of Visit: 2/12/2020    Chief Complaint   Patient presents with     Follow-up     losing weight- riding bike. Decreased metformin to 1 tablet once a day, he is concerned about his kidneys     Knee Pain     wants to increase dosage of meloxicam         Assessment and Plan   1. Type 2 diabetes mellitus without complication, without long-term current use of insulin (H)  Improved diabetes control.  A1c was 7.8 on 9/27/2019.  Takes metformin.  Diabetes better controlled because he lost weight.    2. Essential hypertension  Not ideally controlled.  Blood pressure 150s over 80s.  Takes amlodipine 5 mg daily, hydrochlorothiazide 25 mg daily and losartan 100 mg daily.  Continue for now same BP meds.  Will follow him in 1 month.    3. Mixed hyperlipidemia  Still has increased triglycerides but normal rest of his lipids.  Continue fenofibrate.  - fenofibrate (TRICOR) 48 MG tablet; TAKE 1 TABLET(48 MG) BY MOUTH DAILY  Dispense: 90 tablet; Refill: 2    4. Chemical abuse (H)  Under methadone program for his history of chemical abuse..  Takes methadone 40 mg daily.  Continue to follow with  methadone clinic.    5. Current moderate episode of major depressive disorder without prior episode (H)  Not compliant taking his sertraline.  Hence, his PHQ 9 score is increased 11.  Advised to take his sertraline daily.    6. Chronic pain of left knee  Has chronic pains of his left knee due to osteoarthritis.  Takes meloxicam.  Prescribed knee brace.  Continue with pool therapy at [facility].    7. Morbid obesity (H)  Has lost weight by 15 pounds since last visit so he falls to moderate obesity now.  Encouraged to lose more weight.  Continue with pool therapy and strengthening exercises at the [facility].  Provided a physician order he needs tocontinue his exercises at the [facility].    Follow up in 4 weeks.  Advised to fast next visit.  Due for his fasting lipids, A1c and other labs.     History  of Present Illness   This 59 y.o. old male is here for follow-up.  Was last seen in October.  Has diabetes.  Now has improved diabetes control with metformin, A1c of 7.8 on his last visit.  Has hyperlipemia.  Still shows increased triglycerides despite fenofibrate.  But has normal other fraction of his lipids.  Has hypertension controlled by hydrochlorothiazide, losartan and amlodipine.  Has history of chemical abuse.  Now on methadone.  Under a methadone program and goes regularly to the methadone clinic to get his methadone prescription.  Has major depression but not compliant taking his sertraline.  Complains of left knee pains due to osteoarthritis.  Already takes meloxicam which helps.  Wants a knee brace.  Obese based on his BMI.  But has lost  significant weight since his last visit.  Now he only falls into moderate obesity.  Wants to continue with his exercise as [employer] including strengthening and pool therapy.  Overall, feels well.  No other complaints.    Review of Systems   A 12 point comprehensive review of systems was negative except as noted..     Medications, Allergies and Problem List   Reviewed and updated             Chief Complaint   Follow-up (losing weight- riding bike. Decreased metformin to 1 tablet once a day, he is concerned about his kidneys) and Knee Pain (wants to increase dosage of meloxicam )       Patient Profile   Social History     Social History Narrative    Single. No children. Under disability. Current cigarette smoker, 1/2 ppd. Non alcohol drinker.         Past Medical History   Patient Active Problem List   Diagnosis     Major depression     Essential hypertension     Left knee pain     Back pain     Mixed hyperlipidemia     Type 2 diabetes mellitus without complication, without long-term current use of insulin (H)     Morbid obesity (H)     Obstructive sleep apnea syndrome     Chemical abuse (H)       Past Surgical History  He has no past surgical history on file.  "      Medications and Allergies   Current Outpatient Medications   Medication Sig     acetaminophen (TYLENOL) 325 MG tablet Take 2 tablets (650 mg total) by mouth every 6 (six) hours as needed for pain.     amLODIPine (NORVASC) 5 MG tablet Take 1 tablet (5 mg total) by mouth daily.     calcium, as carbonate, (TUMS) 200 mg calcium (500 mg) chewable tablet Chew 2 tablets as needed. Acid reflux     cholecalciferol, vitamin D3, (VITAMIN D3) 5,000 unit Tab Once daily     fenofibrate (TRICOR) 48 MG tablet TAKE 1 TABLET(48 MG) BY MOUTH DAILY     hydroCHLOROthiazide (HYDRODIURIL) 25 MG tablet TAKE 1 TABLET(25 MG) BY MOUTH DAILY     ibuprofen (ADVIL,MOTRIN) 600 MG tablet Take 1 tablet (600 mg total) by mouth every 6 (six) hours as needed for pain.     losartan (COZAAR) 100 MG tablet Take 1 tablet (100 mg total) by mouth daily.     meloxicam (MOBIC) 15 MG tablet TAKE 1 TABLET(15 MG) BY MOUTH DAILY     metFORMIN (GLUCOPHAGE) 500 MG tablet Take 2 tablets twice a day. (Patient taking differently: Take 500 mg by mouth daily with breakfast. )     methadone (DOLOPHINE) 10 mg/5 mL solution Take 40 mg by mouth every morning.      multivitamin therapeutic tablet Take 1 tablet by mouth daily.     omeprazole (PRILOSEC) 20 MG capsule TAKE 1 CAPSULE BY MOUTH EVERY DAY 1 HOUR BEFORE A MEAL     polyethylene glycol (MIRALAX) 17 gram packet Take 1 packet (17 g total) by mouth daily.     potassium chloride (K-DUR,KLOR-CON) 20 MEQ tablet TAKE 1 TABLET BY MOUTH EVERY 24 HOURS     Allergies   Allergen Reactions     Hay Fever And Allergy Relief      Penicillins Nausea Only        Family and Social History   No family history on file.     Social History     Tobacco Use     Smoking status: Current Every Day Smoker     Packs/day: 0.50     Types: Cigarettes     Smokeless tobacco: Never Used   Substance Use Topics     Alcohol use: Yes     Comment: 1/2 pint on 2/11/2020     Drug use: Yes     Types: \"Crack\" cocaine     Comment: 1 week sober     " "    Physical Exam       Physical Exam  /80   Pulse 74   Ht 5' 1\" (1.549 m)   Wt 185 lb (83.9 kg)   SpO2 97%   BMI 34.96 kg/m    General appearance: alert, appears stated age, cooperative, no distress and moderately obese  Head: Normocephalic, without obvious abnormality, atraumatic  Throat: lips, mucosa, and tongue normal; teeth and gums normal  Neck: no adenopathy, no carotid bruit, no JVD, supple, symmetrical, trachea midline and thyroid not enlarged, symmetric, no tenderness/mass/nodules  Lungs: clear to auscultation bilaterally  Heart: regular rate and rhythm, S1, S2 normal, no murmur, click, rub or gallop  Abdomen: soft, non-tender; bowel sounds normal; no masses,  no organomegaly  Extremities: extremities normal, atraumatic, no cyanosis or edema  Skin: Skin color, texture, turgor normal. No rashes or lesions  Musculoskeletal: Normal exam  Psychiatric: Appropriate affect and mood     Additional Information   Post Discharge Medication Reconciliation Status: discharge medications reconciled, continue medications without change      Chele Hodge MD  Internal Medicine  Contact me at [phone]     Additional Information   Current Outpatient Medications   Medication Sig     acetaminophen (TYLENOL) 325 MG tablet Take 2 tablets (650 mg total) by mouth every 6 (six) hours as needed for pain.     amLODIPine (NORVASC) 5 MG tablet Take 1 tablet (5 mg total) by mouth daily.     calcium, as carbonate, (TUMS) 200 mg calcium (500 mg) chewable tablet Chew 2 tablets as needed. Acid reflux     cholecalciferol, vitamin D3, (VITAMIN D3) 5,000 unit Tab Once daily     fenofibrate (TRICOR) 48 MG tablet TAKE 1 TABLET(48 MG) BY MOUTH DAILY     hydroCHLOROthiazide (HYDRODIURIL) 25 MG tablet TAKE 1 TABLET(25 MG) BY MOUTH DAILY     ibuprofen (ADVIL,MOTRIN) 600 MG tablet Take 1 tablet (600 mg total) by mouth every 6 (six) hours as needed for pain.     losartan (COZAAR) 100 MG tablet Take 1 tablet (100 mg total) by mouth " "daily.     meloxicam (MOBIC) 15 MG tablet TAKE 1 TABLET(15 MG) BY MOUTH DAILY     metFORMIN (GLUCOPHAGE) 500 MG tablet Take 2 tablets twice a day. (Patient taking differently: Take 500 mg by mouth daily with breakfast. )     methadone (DOLOPHINE) 10 mg/5 mL solution Take 40 mg by mouth every morning.      multivitamin therapeutic tablet Take 1 tablet by mouth daily.     omeprazole (PRILOSEC) 20 MG capsule TAKE 1 CAPSULE BY MOUTH EVERY DAY 1 HOUR BEFORE A MEAL     polyethylene glycol (MIRALAX) 17 gram packet Take 1 packet (17 g total) by mouth daily.     potassium chloride (K-DUR,KLOR-CON) 20 MEQ tablet TAKE 1 TABLET BY MOUTH EVERY 24 HOURS     Allergies   Allergen Reactions     Hay Fever And Allergy Relief      Penicillins Nausea Only     Social History     Tobacco Use     Smoking status: Current Every Day Smoker     Packs/day: 0.50     Types: Cigarettes     Smokeless tobacco: Never Used   Substance Use Topics     Alcohol use: Yes     Comment: 1/2 pint on 2/11/2020     Drug use: Yes     Types: \"Crack\" cocaine     Comment: 1 week sober          Time: total time spent with the patient was 25 minutes of which >50% was spent in counseling and coordination of care     "

## 2021-06-06 NOTE — PROGRESS NOTES
Clinic Care Coordination Contact    Follow Up Progress Note      Assessment: BEKAH met with Oscar to discuss goals.    Oscar would like to have a CADI Waiver. BEKAH and Oscar discussed he was denied last year because he had supports in place and is able to function independently. Oscar would like assistance around the home. BEKAH discussed they can try a referral again, but can't guarantee anything because [place] the county and not much has changed medically for him.     Oscar reported a relapse on crack and alcohol. Discussed treatment options and he was considering going back to [facility] in [place]. He reported he would like to try getting back into support groups and then consider treatment. He is taking methadone from [facility]. He reported he will call his ARM worker and psychiatrist to schedule follow up appointments. BEKAH reported a new goal will be made regarding this and [facility] team will check in with him.    Oscar asked about confidentiality. BEKAH explained mandated reporting and reminded him we work with his providers so our notes are accessible by his medical team for review.     He reported he owes [place] $15 and he will work with them to take care of the balance.    BEKAH faxed Change Report Form to [facility] to update address.    Goals addressed this encounter:   Goals Addressed                 This Visit's Progress      I would like to have in-home services, like a PCA/ILS worker to assist me with maintaining my apartment in the next 4 months. (pt-stated)        Action steps to achieve this goal-    1.  I will wait to hear if I can have a new MNChoices Assessment      Date goal set:  5/1/19  Updated: 9/18/19 BC  Updated: 2/12/2020 BC        Mental Health Management (pt-stated)        Goal Statement: I want to reestablish my mental health and substance use supports as soon as possible  Date Goal set: 2/12/2020  Barriers: Had lack of motivation  Strengths: History of attending appointments, knowledge of resources  Date to  Achieve By: 3/12/2020  Patient expressed understanding of goal: Yes  Action steps to achieve this goal:  1. I will call my ARM worker to schedule an appointment  2. I will call my psychiatrist to schedule an appointment  3. I will attend substance use support groups regularly  4. I will let CCC team know if I need assistance in managing this goal                 Intervention/Education provided during outreach: See above          Plan:   Standard Outreach

## 2021-06-07 NOTE — PROGRESS NOTES
Clinic Care Coordination Contact    Follow Up Progress Note CCC Follow Up    Goals addressed this encounter:   Goals Addressed                 This Visit's Progress       General      I would like assistance remembering to attend my scheduled  medical appointments.  (pt-stated)   On track     Action steps to achieve this goal    1.  I will speak with the [facility] Care Guide when she calls to assist me with reminders for my appointments and write them on the calendar.   2.  I will display my appointment calendars that my care guide sends me so I know when my appointments are.   3.  I will attend all appointments scheduled with the Clinic and Specialty appointments. If there is a scheduling conflict I will call and reschedule the appointment.     Date goal set:  5/1/19        I would like to have in-home services, like a PCA/ILS worker to assist me with maintaining my apartment in the next 4 months. (pt-stated)   Not on track     Action steps to achieve this goal-    1.  I will wait to hear if I can have a new MNChoices Assessment      Date goal set:  5/1/19  Updated: 9/18/19 BC  Updated: 2/12/2020 BC        Mental Health Management (pt-stated)   On track     Goal Statement: I want to reestablish my mental health and substance use supports as soon as possible  Date Goal set: 2/12/2020  Barriers: Had lack of motivation  Strengths: History of attending appointments, knowledge of resources  Date to Achieve By: 3/12/2020  Patient expressed understanding of goal: Yes  Action steps to achieve this goal:  1. I will call my [facility] worker to schedule an appointment  2. I will call my psychiatrist to schedule an appointment  3. I will attend substance use support groups regularly  4. I will let [facility] team know if I need assistance in managing this goal                 Intervention/Education provided during outreach: Patient has reestablished with his [facility] worker, he was suspended due to missed appointments.   He is keeping a calendar  of his appointment and is aware he has an appointment on 4/8.  He has not has a new assessment by the [facility] and at this point we are unsure if he needs an appeal or a new assessment.       Plan:   CHW will discuss patient with CCC SW to find out if patient needs to pursue the [facility] appeal for CADI waiver or if he needs a new assessment.     Care Coordinator will follow up in 2 weeks.

## 2021-06-07 NOTE — PROGRESS NOTES
Clinic Care Coordination Contact  [facility]/Voicemail       Clinical Data: Care Coordinator Outreach  Outreach attempted x 1.  Left message on patient's voicemail with call back information and requested return call.  Plan:  Care Coordinator will try to reach patient again in 3-5 business days.  Next outreach due: 5/6/2020

## 2021-06-07 NOTE — PROGRESS NOTES
Clinic Care Coordination Contact     Situation: Patient chart reviewed by care coordinator.     Background: Pts initial assessment and enrollment to Care Coordination was 5-1-2019.   Patient centered goals were developed with participation from patient.  RN CC handed patient off to CHW for continued outreach every 30 days.      Assessment: CHW has been in contact with patient monthly. Patient has made some progress to goals.       Plan/Recommendations: CHW will involve SW CC as needed or if patient is ready to move to maintenance.  SW CC will continue to monitor progress to goals and CHW outreaches every 6 weeks.     Merry Mora, [facility]  Clinic Care Coordinator  [phone]

## 2021-06-07 NOTE — PROGRESS NOTES
Patient spoke with [facility] SW 4/8. No current CHW delegations.    CHW will follow up in 2-3 weeks    Next outreach due:4/28/2020

## 2021-06-07 NOTE — PROGRESS NOTES
Clinic Care Coordination Contact    Follow Up Progress Note      Assessment: Call to patient.  He has appointment with [facility] worker this Friday and that will be the first visit. He has CADI application he wants to complete with help from [facility].  He has a walker he uses due to arthritis in his knees and finds it difficult to clean his apartment.  [place] past year and does not think he was honest with his answers and that is why he couldn't get CADI waiver or PCA.  He admits that being on Methadone makes him lazy.  He has met with his psychiatrist on the phone.      Explained that in order to be eligible for CADI waiver, he needs to be at risk of living in a nursing home if he doesn't get the services.  He understands that is not his situation.  He wants to get assessment done and will be more forthcoming with .      He would like to find a landline phone. In his buildiing, the security system works through a landline and he hasn't had one since he moved in three years ago. Recommended he look at [employer], [employer] or [employer] as they are not expensive.  He agreed that he could afford something under $20.00.  He doesn't like having to go to front door when he has guests.      Asked about his sobriety and he said it was jun good.  No other concerns.     Goals addressed this encounter:   Goals Addressed                 This Visit's Progress       Patient Stated      I would like to have in-home services, like a PCA/ILS worker to assist me with maintaining my apartment in the next 4 months. (pt-stated)   On track     Action steps to achieve this goal-    1.  I will wait to hear if I can have a new [place]      Date goal set:  5/1/19  Updated: 9/18/19 BC  Updated: 2/12/2020 BC        Mental Health Management (pt-stated)   On track     Goal Statement: I want to reestablish my mental health and substance use supports as soon as possible  Date Goal set: 2/12/2020  Barriers:  Had lack of motivation  Strengths: History of attending appointments, knowledge of resources  Date to Achieve By: 3/12/2020  Patient expressed understanding of goal: Yes  Action steps to achieve this goal:  1. I will call my [facility] worker to schedule an appointment  2. I will call my psychiatrist to schedule an appointment  3. I will attend substance use support groups regularly  4. I will let CCC team know if I need assistance in managing this goal                 Intervention/Education provided during outreach: Reviewed criteria to get CADI waiver or PCA.            Plan:   Will delegate to CHW for follow up in one month.  Patient to call with concerns prior to next outreach.  Care Coordinator will follow up in 45 days.     Merry Mora SAMUEL  Clinic Care Coordinator  [phone]

## 2021-06-08 NOTE — PROGRESS NOTES
Clinic Care Coordination Contact    Community Health Worker Follow Up    Goals:   Goals Addressed                 This Visit's Progress       General      I would like assistance remembering to attend my scheduled  medical appointments.  (pt-stated)   On track     Action steps to achieve this goal    1.  I will speak with the [facility] Care Guide when she calls to assist me with reminders for my appointments and write them on the calendar.   2.  I will display my appointment calendars that my care guide sends me so I know when my appointments are.   3.  I will attend all appointments scheduled with the Clinic and Specialty appointments. If there is a scheduling conflict I will call and reschedule the appointment.     Date goal set:  5/1/19        I would like to have in-home services, like a PCA/ILS worker to assist me with maintaining my apartment in the next 4 months. (pt-stated)   Not on track     Action steps to achieve this goal-    1.  I will wait to hear if I can have a new [place]      Date goal set:  5/1/19  Updated: 9/18/19 BC  Updated: 2/12/2020 BC        Mental Health Management (pt-stated)   On track     Goal Statement: I want to reestablish my mental health and substance use supports as soon as possible  Date Goal set: 2/12/2020  Barriers: Had lack of motivation  Strengths: History of attending appointments, knowledge of resources  Date to Achieve By: 3/12/2020  Patient expressed understanding of goal: Yes  Action steps to achieve this goal:  1. I will call my [facility] worker to schedule an appointment  2. I will call my psychiatrist to schedule an appointment  3. I will attend substance use support groups regularly  4. I will let [facility] team know if I need assistance in managing this goal                CHW Next Follow-up: 2-3 weeks    CHW Plan: Patient has been c/o knee pain, CHW asked patient to call the clinic to schedule due to the different appointments at this time and CHW is unsure if he will  need a face to face visit.     He is working with his [facility] worker currently.    Next outreach due: 6/2/2020

## 2021-06-08 NOTE — TELEPHONE ENCOUNTER
I do not see that he has tried any of these alternatives: Procto-Brijesh, Proctosol-Hc Cream, hydrocortisone acetate rectal    Would any of these be a reasonable alterantive?

## 2021-06-08 NOTE — PROGRESS NOTES
Dr. Hodge,  Please review letter request and advise.  Thank you.  Shanae GATICA, CMA/CMT....................2:24 PM

## 2021-06-08 NOTE — PROGRESS NOTES
Clinic Care Coordination Contact    Community Health Worker Follow Up    Goals:   Goals Addressed                 This Visit's Progress       General      I would like assistance remembering to attend my scheduled  medical appointments.  (pt-stated)   On track     Action steps to achieve this goal    1.  I speak with the [facility] Care Guide when she calls to assist me with reminders for my appointments and write them on the calendar.   2.  I will display my appointment calendars that my care guide sends me so I know when my appointments are.   3.  I will attend all appointments scheduled with the Clinic and Specialty appointments. If there is a scheduling conflict I will call and reschedule the appointment.     Date goal set:  5/1/19        I would like to have in-home services, like a PCA/ILS worker to assist me with maintaining my apartment in the next 4 months. (pt-stated)   Not on track     Action steps to achieve this goal-    1.  I will wait to hear if I can have a new [place]      Date goal set:  5/1/19  Updated: 9/18/19 BC  Updated: 2/12/2020 BC        Mental Health Management (pt-stated)   On Hold     Goal Statement: I want to reestablish my mental health and substance use supports as soon as possible  Date Goal set: 2/12/2020  Barriers: Had lack of motivation  Strengths: History of attending appointments, knowledge of resources  Date to Achieve By: 3/12/2020  Patient expressed understanding of goal: Yes  Action steps to achieve this goal:  1. I will call my [facility] worker to schedule an appointment  2. I will call my psychiatrist to schedule an appointment  3. I will attend substance use support groups regularly  4. I will let [facility] team know if I need assistance in managing this goal                CHW Next Follow-up: 2-3 weeks    CHW Plan: Patient has a lab appointment scheduled for next week, CHW made sure patient is aware and has in written down, patient would also like to schedule to talk with [facility]  SW again about options for getting some help at home.     Patient c/o hemorrhoids due to constipation from his methadone, patient requested that CHW send a message to PCP.    Next outreach due: 6/30/2020

## 2021-06-08 NOTE — PROGRESS NOTES
"Oscar Mojica is a 59 y.o. male who is being evaluated via a billable telephone visit.      The patient has been notified of following:     \"This telephone visit will be conducted via a call between you and your physician/provider. We have found that certain health care needs can be provided without the need for a physical exam.  This service lets us provide the care you need with a short phone conversation.  If a prescription is necessary we can send it directly to your pharmacy.  If lab work is needed we can place an order for that and you can then stop by our lab to have the test done at a later time.    Telephone visits are billed at different rates depending on your insurance coverage. During this emergency period, for some insurers they may be billed the same as an in-person visit.  Please reach out to your insurance provider with any questions.    If during the course of the call the physician/provider feels a telephone visit is not appropriate, you will not be charged for this service.\"    Patient has given verbal consent to a Telephone visit? Yes    What phone number would you like to be contacted at? [phone]    Patient would like to receive their AVS by AVS Preference: Mail a copy.    Additional provider notes: Phone visit with Oliver for follow-up.  Has diabetes mellitus.  Apparently he stopped taking his metformin for few weeks now thinking that he does not need it since his A1c was better.  Has hyperlipidemia and hypertension controlled by his medication.  Followed by the methadone clinic for history of chemical abuse.  Takes methadone 40 mg daily.  Has not gone back to using chemical or street drugs substances since he was followed by the methadone clinic.  Has chronic pain of the left knee.  Assessed by orthopedics to be osteoarthritis of the knee a week ago.  Had cortisone injection to his left knee which  helped his pains.  Obese based on his BMI.  But reports lost some weight.  Overall, he feels " well.  No complaints.    Assessment/Plan:  1. Type 2 diabetes mellitus without complication, without long-term current use of insulin (H)  Advised to resume his metformin 500 mg twice a day..  - Glycosylated Hemoglobin A1c; Future  - Basic Metabolic Panel; Future    2. Mixed hyperlipidemia  Controlled.  Last lipids were good, LDL 98, HDL 30, total cholesterol 190 but still increased triglycerides 2/3/2010 because of his diabetes.  Continue fenofibrate.  - Lipid Cascade; Future  - Hepatic Profile; Future    3. Essential hypertension  Controlled.  Continue amlodipine, losartan, hydrochlorothiazide.  - HM2(CBC w/o Differential); Future    4. Obstructive sleep apnea syndrome  Not very compliant using his CPAP.  But reports he sleeps okay.  Advised to be compliant with his CPAP because controlling his JANE will help his other medical problems.    5. Chemical abuse (H)  Followed by the methadone clinic.  Now sober.    6. Chronic pain of left knee  Followed by orthopedics.  Had cortisone shot to his left knee which worked.  Assessed to have osteoarthritis aggravated by his obesity.    7. Morbid obesity (H)  Obese based on his BMI.  But reports he lost some weight.        Needs his fasting labs.  Will do his fasting labs as outpatient.  Advised to call [place] clinic to schedule his lab appointment.        Phone call duration: 15 minutes    Lindy Nye CMA

## 2021-06-08 NOTE — TELEPHONE ENCOUNTER
Dr. Hodge,  Is there any particular alternative that you would like prescribed for the patient?  Shanae GATICA, CMA/CMT....................10:15 AM

## 2021-06-08 NOTE — PROGRESS NOTES
Spoke with the patient and relayed message below from Dr. Hodge regarding requested letter.  He verbalized understanding and will  the letter on Thursday when he is in clinic.  Shanae GATICA CMA/LOLIS....................2:50 PM

## 2021-06-08 NOTE — TELEPHONE ENCOUNTER
Spoke with the patient and relayed message below from Dr. Hodge.  He verbalized understanding and had no further questions at this time.    Prescription has been set up for Dr. Hodge to review per message below.  Shanae GATICA CMA/CMT....................12:23 PM

## 2021-06-08 NOTE — TELEPHONE ENCOUNTER
Please forward this letter of denial to the pharmD who wrote the letter for me. He can write another letter of necessity and I will sign it.

## 2021-06-08 NOTE — PROGRESS NOTES
Clinic Care Coordination Contact  [facility]/Voicemail       Clinical Data: Care Coordinator Outreach  Outreach attempted x 1.  Left message on patient's voicemail with call back information and requested return call.  Plan: Patient left VM for CHW to make appointment for knee pain, CHW left VM that patient should call the clinic at [phone] to schedule appointment at this time due to the types of appointments that are available and if provider is seeing face to face visits  Care Coordinator will try to reach patient again in 1-2 business days.  Next outreach due: 5/12/2020

## 2021-06-08 NOTE — PROGRESS NOTES
Clinic Care Coordination Contact     Situation: Patient chart reviewed by care coordinator.     Background: Pts initial assessment and enrollment to Care Coordination was 2-.   Patient centered goals were developed with participation from patient.  SINCERE CC handed patient off to CHW for continued outreach every 30 days.      Assessment: CHW has been in contact with patient monthly. Patient has made some progress to goals.  Is working with his ARM worker [place]     Plan/Recommendations: CHW will involve sincere CC as needed or if patient is ready to move to maintenance.  SW CC will continue to monitor progress to goals and CHW outreaches every 6 weeks.     Care Plan updated and mailed to patient: no

## 2021-06-08 NOTE — TELEPHONE ENCOUNTER
CHW called patient for [facility] outreach     Patient states that he has been having issues with hemorrhoids due to straining when constipated from the Methadone. He is using his stool softener but not as regularly as he should. Forgot to discuss at his virtual visit. He would like to get something to help with his hemorrhoids.    Uses [facility] on [address]

## 2021-06-08 NOTE — TELEPHONE ENCOUNTER
Central PA team  [phone]  Pool: HE PA MED ([id number])          PA has been initiated.       PA form completed and faxed insurance via Cover My Meds     Key:  [id number] - Rx #: [id number]     Medication:  Anucort-HC 25MG suppositories    Insurance:  [facility]        Response will be received via fax and may take up to 5-10 business days depending on plan

## 2021-06-08 NOTE — PROGRESS NOTES
Clinic Care Coordination Contact    Follow Up Progress Note      Assessment: Talked to patient who asked writer to call [facility] agency to get authorization to start PCA services. He knows a PCA that works there and he wants to hire this person. Patient does not have letter from [facility] stating he qualifies for PCA.  Called [phone] and patient needs to have  authorize services.  Called [facility] intake and patient had second [place] 3[place]26 and it was determined that he does not qualify for PCA or CADI services.  Called patient back and explained that he cannot get PCA services at this time as his ability to take care of himself is not compromised.  Explained that he needs to have help with ADL's and possibly nursing home care to qualify.  He is frustrated that many others get PCA services, but he cannot. Assured him that he has the right insurance and his age doesn't matter.  If his ability to care for himself changes, he can ask for another assessment, but otherwise, they will not come out to complete another assessment.  He does not have the money to hire help.      He is seeing his [employer] worker, Keshav weekly and it is useful.      He would like to get an air conditioner for his apartment. He has lived in apartment the past 3 years. He talked to rae in the building who offered to find him an AC when someone moves out, but it hasn't happened yet. He thinks he can get an AC by working with the SW in his building and he needs documentation from his PCP about why he needs an AC.  He would like PCP to document his diagnosis and why he needs an AC and mail this to him.  He does have a lab visit on 6-18 and could  the letter if completed.      [place] and [place].      Goals addressed this encounter:   Goals Addressed                 This Visit's Progress       Patient Stated      I would like assistance remembering to attend my  scheduled  medical appointments.  (pt-stated)   On track     Action steps to achieve this goal    1.  I speak with the [facility] Care Guide when she calls to assist me with reminders for my appointments and write them on the calendar.   2.  I will display my appointment calendars that my care guide sends me so I know when my appointments are.   3.  I will attend all appointments scheduled with the Clinic and Specialty appointments. If there is a scheduling conflict I will call and reschedule the appointment.     Date goal set:  5/1/19        COMPLETED: I would like to have in-home services, like a PCA/ILS worker to assist me with maintaining my apartment in the next 4 months. (pt-stated)        Action steps to achieve this goal-    1.  I will wait to hear if I can have a new [place]      Date goal set:  5/1/19  Updated: 9/18/19 BC  Updated: 2/12/2020 BC        Mental Health Management (pt-stated)   On track     Goal Statement: I want to reestablish my mental health and substance use supports as soon as possible  Date Goal set: 2/12/2020  Barriers: Had lack of motivation  Strengths: History of attending appointments, knowledge of resources  Date to Achieve By: 3/12/2020  Patient expressed understanding of goal: Yes  Action steps to achieve this goal:  1. I will call my [facility] worker to schedule an appointment  2. I will call my psychiatrist to schedule an appointment  3. I will attend substance use support groups regularly  4. I will let [facility] team know if I need assistance in managing this goal                 Intervention/Education provided during outreach: Reviewed qualifications to get a PCA.            Plan:   Writer will ask PCP to produce letter to help him get an AC through SW in his building.    Care Coordinator will follow up in six weeks.  Delegating to CHW to contact patient in one month.

## 2021-06-08 NOTE — TELEPHONE ENCOUNTER
Dr. Hodge,  Please review HERNÁN lewis and advise on how you would like to proceed.  Thank you.  Shanae GATICA, MARY/CMT....................8:40 AM

## 2021-06-09 NOTE — TELEPHONE ENCOUNTER
Who is calling:  Patient.  Reason for Call:  States he never received the letter from 6/16/2020 regarding him needing the air conditioner.  Please advise and re send the letter.  Date of last appointment with primary care: 5/18/2020  Okay to leave a detailed message: Yes

## 2021-06-09 NOTE — PROGRESS NOTES
Clinic Care Coordination Contact  [facility]       Clinical Data: Care Coordinator Outreach  Outreach attempted x 1. Patient's phone is not accepting calls at this time.  Plan:  Care Coordinator will try to reach patient again in 3-5 business days.  Next outreach due: 7/9/2020

## 2021-06-09 NOTE — PROGRESS NOTES
Clinic Care Coordination Contact    Community Health Worker Follow Up    Goals:   Goals Addressed                 This Visit's Progress       General      COMPLETED: I would like assistance remembering to attend my scheduled  medical appointments.  (pt-stated)        Action steps to achieve this goal    1.  I speak with the [facility] Care Guide when she calls to assist me with reminders for my appointments and write them on the calendar.   2.  I will display my appointment calendars that my care guide sends me so I know when my appointments are.   3.  I will attend all appointments scheduled with the Clinic and Specialty appointments. If there is a scheduling conflict I will call and reschedule the appointment.     Date goal set:  5/1/19        Mental Health Management (pt-stated)   On track     Goal Statement: I want to reestablish my mental health and substance use supports as soon as possible  Date Goal set: 2/12/2020  Barriers: Had lack of motivation  Strengths: History of attending appointments, knowledge of resources  Date to Achieve By: 3/12/2020  Patient expressed understanding of goal: Yes  Action steps to achieve this goal:  1. I will call my [facility] worker to schedule an appointment  2. I will call my psychiatrist to schedule an appointment  3. I will attend substance use support groups regularly  4. I will let [facility] team know if I need assistance in managing this goal            Other (pt-stated)   On track     Goal Statement: I would like assistance with remembering appointments  Date Goal set: 7/15/2020  Barriers: Poor memory  Strengths: Ability to follow through on written instructions.  Date to Achieve By: 15/15/2020  Patient expressed understanding of goal: Yes  Action steps to achieve this goal:  1. I will write my appointments on the calendar  2. I will request CHW to mail calendar of appointments when back in the clinic setting.  3. I will call to reschedule appointments that I can't make instead of just  not showing.              CHW Next Follow-up: 3-4 weeks    CHW Plan: Patient was able to get an air conditioner for his apartment through his waiver program.    Patient was short on his rent this month because he borrowed money to a friend that didn't pay him back. CHW encouraged patient to call his rental office to discuss this and make payment arrangements. He will not borrow money out in the future that will jeopardize his ability to pay his own bills.    Next outreach due: 8/12/2020

## 2021-06-10 NOTE — PROGRESS NOTES
Patient reports he forgot about this appointment. Patient reports he is not currently at home and will not be for some time. He is not able to complete the appointment at this time. Patient would like to reschedule appointment. Informed patient that  will reach out to reschedule assessment. Patient verbalized understanding and is agreeable.

## 2021-06-10 NOTE — PROGRESS NOTES
Clinic Care Coordination Contact     Situation: Patient chart reviewed by care coordinator.     Background: Pts initial assessment and enrollment to Care Coordination was 2-.   Patient centered goals were developed with participation from patient.  SW CC handed patient off to CHW for continued outreach every 30 days.      Assessment: CHW has been in contact with patient monthly. Patient has made progress to goals.  Patient may need new assessment and goals set.     Plan/Recommendations: Will discuss at team meeting.  CHW will involve SW CC as needed or if patient is ready to move to maintenance.  SW CC will continue to monitor progress to goals and CHW outreaches every 6 weeks.     Care Plan updated and mailed to patient: no

## 2021-06-10 NOTE — PROGRESS NOTES
Clinic Care Coordination Contact  [facility]/Voicemail       Clinical Data: Care Coordinator Outreach  Outreach attempted x 1.  Left message on patient's voicemail with call back information and requested return call.  Plan:  Care Coordinator will try to reach patient again in 3-5 business days.  Next outreach due: 8/26/2020

## 2021-06-10 NOTE — PROGRESS NOTES
Clinic Care Coordination Contact    Situation: Patient chart reviewed by care coordinator.    Background: patient enrolled in care coordination and has not been working on goals due to COVID.     Assessment: CHW left message for patient yesterday.  Need to establish new goals if patient is not working on these goals or disenroll patient.     Plan/Recommendations: Will discuss with CHW after CHW talks with patient.     DANNY Ruiz  Clinic Care Coordinator  [phone]

## 2021-06-11 NOTE — PROGRESS NOTES
Clinic Care Coordination Contact  [facility]/Voicemail       Clinical Data: Care Coordinator Outreach  Outreach attempted x 2.  Left message on patient's voicemail with call back information and requested return call.  Plan: Care Coordinator will send unable to contact letter with care coordinator contact information via mail. Care Coordinator will try to reach patient again in 10 business days.  Next outreach due: 9/18/2020

## 2021-06-11 NOTE — TELEPHONE ENCOUNTER
Patient is trying to appeal his denial for PCA and needs a letter from PCP stating that he believes that it is very difficult with his medical conditions to be able to perform many activities of daily living without assistance like cooking and cleaning.   He relies on his walker most of the time now.   Patient is scheduled for office visit in 2 weeks for follow up.    Call patient when letter is ready

## 2021-06-11 NOTE — TELEPHONE ENCOUNTER
RN cannot approve Refill Request    RN can NOT refill this medication med is not covered by policy/route to provider. Last office visit: 2/12/2020 Chele Hodge MD Last Physical: 9/18/2019 Last MTM visit: Visit date not found Last visit same specialty: 2/12/2020 Chele Hodge MD.  Next visit within 3 mo: Visit date not found  Next physical within 3 mo: Visit date not found      Kaya Flores, [facility] Triage/Med Refill 9/21/2020    Requested Prescriptions   Pending Prescriptions Disp Refills     meloxicam (MOBIC) 15 MG tablet [Pharmacy Med Name: MELOXICAM 15MG TABLETS] 90 tablet 3     Sig: TAKE 1 TABLET(15 MG) BY MOUTH DAILY       There is no refill protocol information for this order

## 2021-06-11 NOTE — PROGRESS NOTES
Clinic Care Coordination Contact    Community Health Worker Follow Up    Goals:   Goals Addressed                 This Visit's Progress       General      Mental Health Management (pt-stated)   60%     Goal Statement: I want to reestablish my mental health and substance use supports as soon as possible  Date Goal set: 2/12/2020  Barriers: Had lack of motivation  Strengths: History of attending appointments, knowledge of resources  Date to Achieve By: 3/12/2020  Patient expressed understanding of goal: Yes  Action steps to achieve this goal:  1. I will call my [facility] worker to schedule an appointment  2. I will call my psychiatrist to schedule an appointment  3. I will attend substance use support groups regularly  4. I will let [facility] team know if I need assistance in managing this goal            Other (pt-stated)   50%     Goal Statement: I would like assistance with remembering appointments within the next 4 months.  Date Goal set: 7/15/2020  Barriers: Poor memory  Strengths: Ability to follow through on written instructions.  Date to Achieve By: 12/15/2020  Patient expressed understanding of goal: Yes  Action steps to achieve this goal:  1. I will write my appointments on the calendar  2. I will request CHW to mail calendar of appointments when back in the clinic setting.  3. I will call to reschedule appointments that I can't make instead of just not showing.              CHW Next Follow-up: 3 weeks    CHW Plan: CHW sent message to PCP for his medical opinion regarding PCA services. CHW will follow up with patient in 3-4 weeks regarding PCA and if he needs a follow up appointment with [facility] SW.    Discussion: Patient is working with his [employer] worker to establish PCA services. It is getting more and more difficult for him to manage his activities of daily living.     Patient requested med list to be sent to him. CHW sent message to [facility] RN to mail when he is in clinic this week.     Next CHW outreach due: 10/21/2020

## 2021-06-11 NOTE — PROGRESS NOTES
Clinic Care Coordination Contact  [facility]/Voicemail       Clinical Data: Care Coordinator Outreach  Outreach attempted x 3.  Left message on patient's voicemail with call back information and requested return call.  Plan: Care Coordinator will send disenrollment letter with care coordinator contact information via mail. Care Coordinator will do no further outreaches at this time.  DANNY Ruiz  Clinic Care Coordinator  [phone]    Clinic Care Coordination Contact  Care Team Conversations   Patient called back and talked to CHW.  Reactivated care coordination services.  CHW is assisting with a form for the PCP to complete to help patient get PCA services.  CHW will set up for assessment during next contact.   DANNY Ruiz  Clinic Care Coordinator  [phone]

## 2021-06-11 NOTE — TELEPHONE ENCOUNTER
Refill Approved    Rx renewed per Medication Renewal Policy. Medication was last renewed on 8/10/19.    Kaya Flores, [facility] Triage/Med Refill 9/10/2020     Requested Prescriptions   Pending Prescriptions Disp Refills     omeprazole (PRILOSEC) 20 MG capsule [Pharmacy Med Name: OMEPRAZOLE 20MG CAPSULES] 90 capsule 3     Sig: TAKE 1 CAPSULE BY MOUTH EVERY DAY 1 HOUR BEFORE A MEAL       GI Medications Refill Protocol Passed - 9/8/2020  7:11 PM        Passed - PCP or prescribing provider visit in last 12 or next 3 months.     Last office visit with prescriber/PCP: 2/12/2020 Chele Hodge MD OR same dept: 2/12/2020 Chele Hodge MD OR same specialty: 2/12/2020 Chele Hodge MD  Last physical: 9/18/2019 Last MTM visit: Visit date not found   Next visit within 3 mo: Visit date not found  Next physical within 3 mo: Visit date not found  Prescriber OR PCP: Chele Hodge MD  Last diagnosis associated with med order: 1. Excessive gas  - omeprazole (PRILOSEC) 20 MG capsule [Pharmacy Med Name: OMEPRAZOLE 20MG CAPSULES]; TAKE 1 CAPSULE BY MOUTH EVERY DAY 1 HOUR BEFORE A MEAL  Dispense: 90 capsule; Refill: 3    If protocol passes may refill for 12 months if within 3 months of last provider visit (or a total of 15 months).

## 2021-06-11 NOTE — TELEPHONE ENCOUNTER
Called patient and unable to leave a voicemail as the voicemail box is full.     Was going to inform patient that this can be discussed at his next appt in 2 weeks.     Francisca, can you try and reach this patient and let him know, we will discuss at his next visit.     Jaci Madrid LPN
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
## 2021-06-12 NOTE — TELEPHONE ENCOUNTER
Refill Approved    Rx renewed per Medication Renewal Policy. Medication was last renewed on 7/25/19.    Kaya Flores, [facility] Triage/Med Refill 10/26/2020     Requested Prescriptions   Pending Prescriptions Disp Refills     potassium chloride (K-DUR,KLOR-CON) 20 MEQ tablet [Pharmacy Med Name: POTASSIUM CL 20MEQ ER TABLETS] 90 tablet 2     Sig: TAKE 1 TABLET BY MOUTH EVERY 24 HOURS       Potassium Supplements Refill Protocol Passed - 10/24/2020  5:37 PM        Passed - PCP or prescribing provider visit in past 12 months       Last office visit with prescriber/PCP: 10/13/2020 Chele Hodge MD OR same dept: 10/13/2020 Chele Hodge MD OR same specialty: 10/13/2020 Chele Hodge MD  Last physical: 9/18/2019 Last MTM visit: Visit date not found   Next visit within 3 mo: Visit date not found  Next physical within 3 mo: Visit date not found  Prescriber OR PCP: Chele Hodge MD  Last diagnosis associated with med order: 1. Essential hypertension  - potassium chloride (K-DUR,KLOR-CON) 20 MEQ tablet [Pharmacy Med Name: POTASSIUM CL 20MEQ ER TABLETS]; TAKE 1 TABLET BY MOUTH EVERY 24 HOURS  Dispense: 90 tablet; Refill: 2    If protocol passes may refill for 12 months if within 3 months of last provider visit (or a total of 15 months).             Passed - Potassium level in last 12 months     Lab Results   Component Value Date    Potassium 3.8 06/24/2020

## 2021-06-12 NOTE — PROGRESS NOTES
Oscar Waddellon attended 2 hours of group today. Patient reported that he was having difficulty connecting to the group this morning.     The group topic was Relapse Prevention, patient was responsive to topic.     Patient's engagement in the group session: medium     Total group size: 9      DEBBIE Harvey, [facility]  11/9/2020, 2:42 PM
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
## 2021-06-12 NOTE — PROGRESS NOTES
Office Visit - Follow Up   Oscar Mojica   60 y.o. male    Date of Visit: 10/13/2020    Chief Complaint   Patient presents with     Follow-up     needs letter of medical necessity for PCA services     Flu Vaccine        Assessment and Plan   1. Essential hypertension  Not ideally controlled today. Due to his alcohol consumption and doing crack cocaine again. For ow continue same bp meds, hydrochlorothiazide, amlodipine, losartan.     2. Mixed hyperlipidemia  Controlled. Last lipids were good. Continue fenofibrate.     3. Type 2 diabetes mellitus without complication, without long-term current use of insulin (H)  Controlled. Last A1c was 6.1. Continue metformin.     4. Hemorrhoids, unspecified hemorrhoid type  Gets inflamed and irritated. Okay to continue anucort hc suppository.   - hydrocortisone (ANUCORT-HC) 25 mg suppository; UNWRAP AND INSERT 1 SUPPOSITORY(25 MG) RECTALLY TWICE DAILY  Dispense: 20 suppository; Refill: 3    5. Chemical abuse (H)  Back to his drinking alcohol, pint of moncho almost daily and smoking cracked 3 times a week despite being in the methadone clinic. Advised to inform his methadone clinic for this. Reiterated to him he cannot abuse alcohol and crack.  This has caused his blood pressure to go up. His blood pressure was well controlled in the past.     6. Need for immunization against influenza  Flu shot was given.   - Influenza, Recombinant, Inj, Quadrivalent, PF, 18+YRS    7. Screen for colon cancer  Due for colon cancer screening. Will schedule colonoscopy.     Letter was written in support in getting a PCA and .       Follow up in 3 months.      History of Present Illness   This 60 y.o. old male is here for follow up. Admitted he went back drinking alcohol and using crack cocaine despite being enrolled in the methadone clinic. His blood pressure is increased due to these. Has hyperlipidemia and diabetes controlled by his meds. Overall, feels fine. Ambulates with a walker.  Needs a letter of support for having a PCA and .      Review of Systems   A 12 point comprehensive review of systems was negative except as noted..     Medications, Allergies and Problem List   Reviewed and updated             Chief Complaint   Follow-up (needs letter of medical necessity for PCA services) and Flu Vaccine       Patient Profile   Social History     Social History Narrative    Single. No children. Under disability. Current cigarette smoker, 1/2 ppd. Non alcohol drinker.         Past Medical History   Patient Active Problem List   Diagnosis     Major depression     Essential hypertension     Left knee pain     Back pain     Mixed hyperlipidemia     Type 2 diabetes mellitus without complication, without long-term current use of insulin (H)     Morbid obesity (H)     Obstructive sleep apnea syndrome     Chemical abuse (H)       Past Surgical History  He has no past surgical history on file.       Medications and Allergies   Current Outpatient Medications   Medication Sig     acetaminophen (TYLENOL) 325 MG tablet Take 2 tablets (650 mg total) by mouth every 6 (six) hours as needed for pain.     amLODIPine (NORVASC) 5 MG tablet TAKE 1 TABLET(5 MG) BY MOUTH DAILY     calcium, as carbonate, (TUMS) 200 mg calcium (500 mg) chewable tablet Chew 2 tablets as needed. Acid reflux     cholecalciferol, vitamin D3, (VITAMIN D3) 5,000 unit Tab Once daily     fenofibrate (TRICOR) 48 MG tablet TAKE 1 TABLET(48 MG) BY MOUTH DAILY     hydroCHLOROthiazide (HYDRODIURIL) 25 MG tablet TAKE 1 TABLET(25 MG) BY MOUTH DAILY     hydrocortisone (ANUCORT-HC) 25 mg suppository UNWRAP AND INSERT 1 SUPPOSITORY(25 MG) RECTALLY TWICE DAILY     hydrocortisone (PROCTOSOL HC) 2.5 % rectal cream Insert into the rectum 2 (two) times a day.     ibuprofen (ADVIL,MOTRIN) 600 MG tablet Take 1 tablet (600 mg total) by mouth every 6 (six) hours as needed for pain.     losartan (COZAAR) 100 MG tablet Take 1 tablet (100 mg total) by mouth  "daily.     meloxicam (MOBIC) 15 MG tablet TAKE 1 TABLET(15 MG) BY MOUTH DAILY     metFORMIN (GLUCOPHAGE) 500 MG tablet Take 1 tablet (500 mg total) by mouth 2 (two) times a day with meals. START BY TAKING 1 TABLET BY MOUTH ONCE DAILY WITH SUPPER FOR 3 WEEKS, THEN INCREASE TO TWICE DAILY     methadone (DOLOPHINE) 10 mg/5 mL solution Take 40 mg by mouth every morning.      multivitamin therapeutic tablet Take 1 tablet by mouth daily.     omeprazole (PRILOSEC) 20 MG capsule TAKE 1 CAPSULE BY MOUTH EVERY DAY 1 HOUR BEFORE A MEAL     polyethylene glycol (MIRALAX) 17 gram packet Take 1 packet (17 g total) by mouth daily.     potassium chloride (K-DUR,KLOR-CON) 20 MEQ tablet TAKE 1 TABLET BY MOUTH EVERY 24 HOURS     Allergies   Allergen Reactions     Hay Fever And Allergy Relief      Penicillins Nausea Only        Family and Social History   No family history on file.     Social History     Tobacco Use     Smoking status: Current Every Day Smoker     Packs/day: 0.50     Types: Cigarettes     Smokeless tobacco: Never Used   Substance Use Topics     Alcohol use: Yes     Comment: 1/2 pint on 2/11/2020     Drug use: Yes     Types: \"Crack\" cocaine     Comment: 1 week sober         Physical Exam       Physical Exam  /74   Pulse 79   Wt 186 lb (84.4 kg)   SpO2 95%   BMI 35.14 kg/m    General appearance: alert, appears stated age, cooperative, no distress and moderately obese  Head: Normocephalic, without obvious abnormality, atraumatic  Throat: lips, mucosa, and tongue normal; teeth and gums normal  Neck: no adenopathy, no carotid bruit, no JVD, supple, symmetrical, trachea midline and thyroid not enlarged, symmetric, no tenderness/mass/nodules  Lungs: clear to auscultation bilaterally  Heart: regular rate and rhythm, S1, S2 normal, no murmur, click, rub or gallop  Abdomen: soft, non-tender; bowel sounds normal; no masses,  no organomegaly  Extremities: extremities normal, atraumatic, no cyanosis or edema  Skin: Skin " color, texture, turgor normal. No rashes or lesions  Neurologic: Grossly normal, ambulates with a walker     Additional Information   Post Discharge Medication Reconciliation Status: discharge medications reconciled, continue medications without change      Chele Hodge MD  Internal Medicine  Contact me at [phone]     Additional Information   Current Outpatient Medications   Medication Sig     acetaminophen (TYLENOL) 325 MG tablet Take 2 tablets (650 mg total) by mouth every 6 (six) hours as needed for pain.     amLODIPine (NORVASC) 5 MG tablet TAKE 1 TABLET(5 MG) BY MOUTH DAILY     calcium, as carbonate, (TUMS) 200 mg calcium (500 mg) chewable tablet Chew 2 tablets as needed. Acid reflux     cholecalciferol, vitamin D3, (VITAMIN D3) 5,000 unit Tab Once daily     fenofibrate (TRICOR) 48 MG tablet TAKE 1 TABLET(48 MG) BY MOUTH DAILY     hydroCHLOROthiazide (HYDRODIURIL) 25 MG tablet TAKE 1 TABLET(25 MG) BY MOUTH DAILY     hydrocortisone (ANUCORT-HC) 25 mg suppository UNWRAP AND INSERT 1 SUPPOSITORY(25 MG) RECTALLY TWICE DAILY     hydrocortisone (PROCTOSOL HC) 2.5 % rectal cream Insert into the rectum 2 (two) times a day.     ibuprofen (ADVIL,MOTRIN) 600 MG tablet Take 1 tablet (600 mg total) by mouth every 6 (six) hours as needed for pain.     losartan (COZAAR) 100 MG tablet Take 1 tablet (100 mg total) by mouth daily.     meloxicam (MOBIC) 15 MG tablet TAKE 1 TABLET(15 MG) BY MOUTH DAILY     metFORMIN (GLUCOPHAGE) 500 MG tablet Take 1 tablet (500 mg total) by mouth 2 (two) times a day with meals. START BY TAKING 1 TABLET BY MOUTH ONCE DAILY WITH SUPPER FOR 3 WEEKS, THEN INCREASE TO TWICE DAILY     methadone (DOLOPHINE) 10 mg/5 mL solution Take 40 mg by mouth every morning.      multivitamin therapeutic tablet Take 1 tablet by mouth daily.     omeprazole (PRILOSEC) 20 MG capsule TAKE 1 CAPSULE BY MOUTH EVERY DAY 1 HOUR BEFORE A MEAL     polyethylene glycol (MIRALAX) 17 gram packet Take 1 packet (17 g  "total) by mouth daily.     potassium chloride (K-DUR,KLOR-CON) 20 MEQ tablet TAKE 1 TABLET BY MOUTH EVERY 24 HOURS     Allergies   Allergen Reactions     Hay Fever And Allergy Relief      Penicillins Nausea Only     Social History     Tobacco Use     Smoking status: Current Every Day Smoker     Packs/day: 0.50     Types: Cigarettes     Smokeless tobacco: Never Used   Substance Use Topics     Alcohol use: Yes     Comment: 1/2 pint on 2/11/2020     Drug use: Yes     Types: \"Crack\" cocaine     Comment: 1 week sober          Time: total time spent with the patient was 25 minutes of which >50% was spent in counseling and coordination of care     "

## 2021-06-12 NOTE — PROGRESS NOTES
Oscar Mojica attended 2 hours of group today.     The group topic was Addiction 101, patient was responsive to topic.     Patient's engagement in the group session: medium     Total group size: 8      DEBBIE Harvey, [facility]  11/4/2020, 4:45 PM

## 2021-06-12 NOTE — TELEPHONE ENCOUNTER
Who is calling:  Oscar  Reason for Call:  Patient needs Dr. Hodge to contact his worker at [facility] about getting a PCA.  [facility].  PH. [phone]  Date of last appointment with primary care: 10/13/2020  Okay to leave a detailed message: Yes

## 2021-06-12 NOTE — PROGRESS NOTES
Telemedicine Visit: The patient's condition can be safely assessed and treated via synchronous audio and visual telemedicine encounter.      Reason for Telemedicine Visit: In-person services limited due to Covid-19.     Originating Site (Patient Location): Patient's home    Distant Site (Provider Location): Provider Remote Setting- Home Office    Consent:  The patient/guardian has verbally consented to: the potential risks and benefits of telemedicine (video visit) versus in person care; bill my insurance or make self-payment for services provided; and responsibility for payment of non-covered services.     Mode of Communication:  Video Conference via Zoom    Call Started at: 10:15 AM  Call Ended at: 12:00 PM    As the provider I attest to compliance with applicable laws and regulations related to telemedicine.

## 2021-06-12 NOTE — PROGRESS NOTES
Clinic Care Coordination Contact    Follow Up Progress Note      Assessment: Call from patient who has a woman who has been helping with routine housekeeping tasks that are difficult for him to perform.  She works as a PCA and he wants to hire her. Unfortunately, patient was assessed in March 2020 by [facility] and he does not qualify for PCA services.  Patient asked writer to call this man who met with him yesterday regarding PCA, he doesn't remember name, [phone].  Discussed patient needs and he does not require personal care.  His needs have not changed since March.  His knees bother him, but he is able to do his personal cares.      Called number and reached Juan who runs a PCA agency.  He went over paperwork from [employer] assessment with patient and told him he has to appeal the decision to Department of Human Services.      Called patient and reviewed his option to make appeal and that [facility] worker could assist if he wants to do that. Explained that PCP cannot order PCA services. He doesn't understand why other people get the service and he can't. Reviewed that process and he can file appeal or wait until a year passes from his last assessment.      He would like to have some help with cleaning.  Called the [facility] [employer] program to see if he would be eligible and if they are providing this service.  Some things are curtained due to COVID. LM asking for a call back.     Goals addressed this encounter:   Goals Addressed                 This Visit's Progress       Patient Stated      Mental Health Management (pt-stated)   70%     Goal Statement: I want to reestablish my mental health and substance use supports as soon as possible  Date Goal set: 2/12/2020  Barriers: Had lack of motivation  Strengths: History of attending appointments, knowledge of resources  Date to Achieve By: 3/12/2020  Patient expressed understanding of goal: Yes  Action steps to achieve this goal:  1. I will call my  [facility] worker to schedule an appointment  2. I will call my psychiatrist to schedule an appointment  3. I will attend substance use support groups regularly  4. I will let [facility] team know if I need assistance in managing this goal  10-  has [facility] worker.  discussed            Other (pt-stated)   60%     Goal Statement: I would like assistance with remembering appointments within the next 4 months.  Date Goal set: 7/15/2020  Barriers: Poor memory  Strengths: Ability to follow through on written instructions.  Date to Achieve By: 12/15/2020  Patient expressed understanding of goal: Yes  Action steps to achieve this goal:  1. I will write my appointments on the calendar  2. I will request CHW to mail calendar of appointments when back in the clinic setting.  3. I will call to reschedule appointments that I can't make instead of just not showing.    10- discussed.             Intervention/Education provided during outreach: Reviewed PCA program and rules.       Outreach Frequency: monthly    Plan:   Will call patient after getting update from [employer] [facility].    Care Coordinator will follow up in one week.   Merry Mora [facility]  Clinic Care Coordinator  [phone]          Clinic Care Coordination Contact  [facility]       Clinical Data: Care Coordinator Outreach  Outreach attempted x 2.  Left message on patient's voicemail with call back information and requested return call to CHW.  Plan: Delegating to CHW to try and reach patient.    SW CC available as needs arise.    Merry Mora [facility] Care Coordinator  [phone]            Clinic Care Coordination Contact  [facility]       Clinical Data: Care Coordinator Outreach- patient is trying to start PCA services.  Has completed SUB assessment at [facility].   Outreach attempted x 1.  Left message on patient's voicemail with call back information and requested return call.  Plan: Care Coordinator will try to reach patient again in 3-5 business  days.  Merry Mora, [facility]  Clinic Care Coordinator  [phone]

## 2021-06-12 NOTE — PROGRESS NOTES
Patient spoke with CCC SW about PCA services on 10/23.    CHW reviewed CCC SW notes, no CHW delegations at this time.    Next outreach due: 11/12/2020
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
## 2021-06-12 NOTE — PROGRESS NOTES
Telemedicine Visit: The patient's condition can be safely assessed and treated via synchronous audio and visual telemedicine encounter.      Reason for Telemedicine Visit: In-person services limited due to Covid-19.     Originating Site (Patient Location): Patient's home    Distant Site (Provider Location): Provider Remote Setting- Home Office    Consent:  The patient/guardian has verbally consented to: the potential risks and benefits of telemedicine (video visit) versus in person care; bill my insurance or make self-payment for services provided; and responsibility for payment of non-covered services.     Mode of Communication:  Video Conference via Zoom    Call Started at: 9:00 AM  Call Ended at: 11:00 AM    As the provider I attest to compliance with applicable laws and regulations related to telemedicine.

## 2021-06-12 NOTE — PROGRESS NOTES
"Individual Phone Session    Oscar Mojica is a 60 y.o. male who is being evaluated via telephone visit.      The patient has been notified of the following:      \"We have found that certain health care needs can be provided without the need for a face to face visit.  This service lets us provide the care you need with a phone conversation. I will have full access to your [facility] medical record during this entire phone call. I will be taking notes for your medical record. Since this is like an office visit, we will bill your insurance company for this service. There are potential benefits and risks of telephone visits (e.g. limits to patient confidentiality) that differ from in-person visits.?  Confidentiality still applies for telephone services, and nobody will record the visit.  It is important to be in a quiet, private space that is free of distractions (including cell phone or other devices) during the visit.?If during the course of the call I believe a telephone visit is not appropriate, you will not be charged for this service\"    Counselor and patient spoke via phone for 88 minutes for chemical health evaluation.     Call Notes: Counselor contacted patient for chemical health evaluation during suspension of in person services.     Provider location: [facility]   Patient location: Home  Mode of Transmission: Telephone    Call Started at: 1:30 PM  Call Ended at: 2:58 PM    Patient's engagement in the telephone visit: MATHEW Jimenez  10/12/2020, 1:32 PM      "

## 2021-06-12 NOTE — PROGRESS NOTES
Telemedicine Visit: The patient's condition can be safely assessed and treated via synchronous audio and visual telemedicine encounter.      Reason for Telemedicine Visit: Services only offered telehealth    Originating Site (Patient Location): Patient's home    Distant Site (Provider Location): [facility]    Consent:  The patient/guardian has verbally consented to: the potential risks and benefits of telemedicine (video visit) versus in person care; bill my insurance or make self-payment for services provided; and responsibility for payment of non-covered services.     Mode of Communication:  Video Conference via Zoom    Call Started at: 09:00 AM  Call Ended at: 12:00 PM    As the provider I attest to compliance with applicable laws and regulations related to telemedicine.
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
## 2021-06-12 NOTE — PROGRESS NOTES
Oscar Mojica attended 3 hours of group today.     The group topic was Addiction 101, patient was responsive to topic.     Patient's engagement in the group session: medium     Total group size: 10      DEBBIE Harvey, MATHEW and MATHEW Price  11/6/2020, 1:31 PM

## 2021-06-12 NOTE — PROGRESS NOTES
Clinic Care Coordination Contact    Follow Up Progress Note      Assessment: No call back from [facility]. Called again and they sometimes have volunteers who do cleaning, but they are not offering this service during COVID.  Called patient and reviewed that he needs to hire someone and pay privately.  He cannot afford that.  Reviewed his options for a PCA, he needs to file an appeal, or wait a year to have a new [place] [employer] assessment.  He is seeing a chiropractor for back and knee pain.  He may want to resume PT at [facility] and will think it over. [facility] also [facility] to get some [facility].  He does like to ride a bike.  His [employer] worker is helping him apply for new affordable housing, [facility]. There is a one year waiting list, but he thinks it would be safer.      He started outpatient SUB treatment this week and had his first zoom session today. He enjoyed it and was comfortable with the technology.  He is feeling good about himself at this time.      Goals addressed this encounter:   Goals Addressed                 This Visit's Progress       Patient Stated      Mental Health Management (pt-stated)        Goal Statement: I want to reestablish my mental health and substance use supports as soon as possible  Date Goal set: 2/12/2020  Barriers: Had lack of motivation  Strengths: History of attending appointments, knowledge of resources  Date to Achieve By: 3/12/2020  Patient expressed understanding of goal: Yes  Action steps to achieve this goal:  1. I will call my [employer] worker to schedule an appointment  2. I will call my psychiatrist to schedule an appointment  3. I will attend substance use support groups regularly  4. I will let [facility] team know if I need assistance in managing this goal  10-  has [employer] worker.  discussed  11-4-2020 attending outpatient SUB treatment at [facility]               Intervention/Education provided during outreach: discussed private pay cleaning help  options.       Outreach Frequency: monthly    Plan:   Delegating to [facility] to contact patient in less than 30 days.    Care Coordinator will follow up in as needed and in 45 days.  Merry Mora [facility]  Clinic Care Coordinator  [phone]

## 2021-06-12 NOTE — TELEPHONE ENCOUNTER
Spoke with patient and let him know Dr. Hodge is out of the office until next Tuesday. He stated this message was supposed to go to Francisca.

## 2021-06-12 NOTE — PROGRESS NOTES
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 health issues. NA.  5C. Are you concerned for your safety there? no  5D. Are you concerned about the safety of anyone else who lives with you? no    6A. Do you have children who live with you? no  If the person lives with children, ask follow-up questions to determine the person's relationship and responsibility, both legal and care giving; and what arrangements are made for supervision for the children when the person is not available.      6B. Do you have children who do not live with you?  no  If yes, ask follow up questions to learn where the children are, who has custody and what the person't relaltionship and responsibility is with these children and what hopes the person has for his or her future with these children.      7A. Who supports you in making changes in your alcohol or drug use? What are they willing to do to support you? Who is upset or angry about you making changes in your alcohol or drug use? How big a problem is this for you?      Girlfriend, 2 of my sisters.    7B. This table is provided to record information about the person s relationships and available support It is not necessary to ask each item; only to get a comprehensive picture of their support system.  How often can you count on the " "following people when you need someone?   Partner / Spouse Always supportive   Parent(s)/Aunt(s)/Uncle(s)/Grandparents    Sibling(s)/Cousin(s) Always supportive   Child(curtis)    Other relative(s)    Friend(s)/neighbor(s) Rarely supportive   Child(curtis) s father(s)/mother(s)    Support group member(s)    Community of debi members    /counselor/therapist/healer    Other (specify)      8A. What is your current living situation?     Apartment alone    8B. What is your long term plan for where you will be living?    \"I want to move.\"    8C. Tell me about your living environment/neighborhood? Ask enough follow up questions to determine safety, criminal activity, availability of alcohol and drugs, supportive or antagonistic to the person making changes.      Using people in the building.    9. Criminal justice history: Gather current/recent history and any significant history related to substance use--Arrests? Convictions? Circumstances? Alcohol or drug involvement? Sentences? Still on probation or parole? Expectations of the court? Current court order? Any sex offenses - lifetime? What level? (DSM)    None    10. What obstacles exist to participating in treatment? (Time off work, childcare, funding, transportation, pending longterm time, living situation)    \"No, other than my cat.\"    Dimension VI Ratings   Recovery environment - The placing authority must use the criteria in Dimension VI to determine a client s recovery environment.   RISK DESCRIPTIONS - Severity ratin  Client is engaged in structured, meaningful activity, but peers, family, significant other, living environment are unsupportive, or there is criminal justice involvement by the client or among the client's peers, significatn others, or in the client's environment.    REASONS SEVERITY WAS ASSIGNED - (What support does the person have for making changes? What structure/stability does the person have in his or her daily life that willincrease " the likelihood that changes can be sustained? What problems exist in the person s environment that will jeopardize getting/staying clean and sober?)     Patient is on disability. Patient has stable housing. Patient is engaged in limited structured daily activities. Patient reports limited positive support system. Patient denies past or current legals.       Client Choice/Exceptions     Would you like services specific to language, age, gender, culture, Christian preference, race, ethnicity, sexual orientation or disability?  no    If yes, specify:      What particular treatment choices and options would you like to have?  Outpatient    Do you have a preference for a particular treatment program?  No preference      .    DSM-V Criteria for Substance Abuse  Instructions  Determine whether the client currently meets the criteria for a Substance Use Disorder using the diagnostic criteria in the DSM-V, pp. 481-589. Current means during the most recent 12 months outside a facility that controls access to substances.    Category of substance Severity ICD-10 Code/DSM V Code   [x]  Alcohol Use Disorder [] Mild  [x] Moderate  [] Severe [] (F10.10) (305.00)  [x] (F10.20) (303.90)  [] (F10.20) (303.90)   []  Cannabis Use Disorder [] Mild  [] Moderate  [] Severe [] (F12.10) (305.20)  [] (F12.20) (304.30)  [] (F12.20) (304.30)   [] Hallucinogen Use Disorder [] Mild  [] Moderate  [] Severe [] (F16.10) (305.30)  [] (F16.20) (304.50)  [] (F16.20) (304.50)   []  Inhalant Use Disorder [] Mild  [] Moderate  [] Severe [] (F18.10) (305.90)  [] (F18.20) (304.60)  [] (F18.20) (304.60)   [x]  Opioid Use Disorder [] Mild  [x] Moderate  [] Severe [] (F11.10) (305.50)  [x] (F11.20) (304.00)  [] (F11.20) (304.00)   []  Sedative, Hypnotic, or Anxiolytic Use Disorder [] Mild  [] Moderate  [] Severe [] (F13.10) (305.40)   [] (F13.20) (304.10)  [] (F13.20) (304.10)   [x]  Stimulant Related Disorders [] Mild  [x] Moderate  [] Severe [] (F15.10)  (305.70) Amphetamine type substance  [] (F14.10) (305.60) Cocaine  [] (F15.10) (305.70) Other or unspecified stimulant  [] (F15.20) (304.40) Amphetamine type substance  [x] (F14.20) (304.20) Cocaine  [] (F15.20) (304.40) Other or unspecified stimulant  [] (F15.20) (304.40) Amphetamine type substance  [] (F14.20) (304.20) Cocaine  [] (F15.20) (304.40) Other or unspecified stimulant   []  Tobacco use Disorder [] Mild  [] Moderate  [] Severe [] (Z72.0) (305.1)  [] (F17.200) (305.1)  [] (F17.200) (305.1)   []  Other (or unknown) Substance Use Disorder [] Mild  [] Moderate  [] Severe [] (F19.10) (305.90)  [] (F19.20) (304.90)  [] (F19.20) (304.90)       Suggested Level of Care Necessary for Recovery  []  Inpatient  []  Extended Care []  Residential []  Outpatient  []  None            Collateral Contact Summary   Number of contacts made:  1  Contact with referring person:  yes     If court related records were reviewed, summarize here:  [place] public record reviewed to verify criminal record.      [x]   Information from collateral contacts supported/largely agreed with information from the client and associated risk ratings.   []   Information from collateral contacts was significantly different from information from the client and lead to different risk ratings.      Summarize new information here:      Rule 25 Assessment Summary and Plan   's Recommendation    Based on the information gathered in this assessment and from collateral information, the client meets DSM-V criteria for Stimulant Use Disorder, Moderate, (F14.20) (304.20) Cocaine, Alcohol Use Disorder, Moderate, (F10.20) (303.90), and Opioid Use Disorder, Moderate, (F11.20) (304.00).     -Outpatient treatment program.  -Detoxification services may be required if alcohol use is resumed.  -Follow recommendations of treatment program.  -Abstain from use of mood altering substances not prescribed, including alcohol.  -Consider additional establishing mental  health care services.  -Remain law abiding.      This assessment can be updated with additional information within a 6 month period.      Collateral Contacts      Name    Karol/Kaylin Relationship    STS Counselor Phone Number    [phone] Releases    yes       Information Provided:      10/19/2020- Karol is out for the week.  Spoke with counselor of the day-Kaylin.  Chart notes encouragement for treatment. He was attending [facility], but the program was discontinued due to staffing.    Collateral Contacts     Name    Ira Yun   Relationship    Significant other Phone Number    [phone] Releases    yes       Information Provided:      10/19/2020- LM @ 10:51AM    Collateral Contacts     Name    Epic Chart Review   Relationship    NA Phone Number    NA Releases    NA       Information Provided:      Epic chart reviewed.      A problematic pattern of alcohol/drug use leading to clinically significant impairment or distress, as manifested by at least two of the following, occurring within a 12-month period:      Specify if: In early remission:  After full criteria for alcohol/drug use disorder were previously met, none of the criteria for alcohol/drug use disorder have been met for at least 3 months but for less than 12 months (with the exception that Criterion A4,  Craving or a strong desire or urge to use alcohol/drug  may be met).     In sustained remission:   After full criteria for alcohol use disorder were previously met, none of the criteria for alcohol/drug use disorder have been met at any time during a period of 12 months or longer (with the exception that Criterion A4,  Craving or strong desire or urge to use alcohol/drug  may be met).   Specify if:   This additional specifier is used if the individual is in an environment where access to alcohol is restricted.    Mild: Presence of 2-3 symptoms  Moderate: Presence of 4-5 symptoms  Severe: Presence of 6 or more symptoms      Staff Name and  Title: Jeniffer Sanchez [facility]  Date:  10/12/2020  Time:  1:33 PM

## 2021-06-12 NOTE — TELEPHONE ENCOUNTER
RN cannot approve Refill Request    RN can NOT refill this medication medication not on med list. Last office visit: 2/12/2020 Chele Hodge MD Last Physical: 9/18/2019 Last MTM visit: Visit date not found Last visit same specialty: 2/12/2020 Chele Hodge MD.  Next visit within 3 mo: Visit date not found  Next physical within 3 mo: Visit date not found      Thelma Witt, [facility] Triage/Med Refill 10/8/2020    Requested Prescriptions   Pending Prescriptions Disp Refills     ANUCORT-HC 25 mg suppository [Pharmacy Med Name: ANUCORT-HC 25MG RECTAL SUPP  24S] 20 suppository 0     Sig: UNWRAP AND INSERT 1 SUPPOSITORY(25 MG) RECTALLY TWICE DAILY       GI Medications Refill Protocol Passed - 10/5/2020  4:40 PM        Passed - PCP or prescribing provider visit in last 12 or next 3 months.     Last office visit with prescriber/PCP: 2/12/2020 Chele Hodge MD OR same dept: 2/12/2020 Chele Hodge MD OR same specialty: 2/12/2020 Chele Hogde MD  Last physical: 9/18/2019 Last MTM visit: Visit date not found   Next visit within 3 mo: Visit date not found  Next physical within 3 mo: Visit date not found  Prescriber OR PCP: Chele Hodge MD  Last diagnosis associated with med order: 1. Hemorrhoids, unspecified hemorrhoid type  - ANUCORT-HC 25 mg suppository [Pharmacy Med Name: ANUCORT-HC 25MG RECTAL SUPP  24S]; UNWRAP AND INSERT 1 SUPPOSITORY(25 MG) RECTALLY TWICE DAILY  Dispense: 20 suppository; Refill: 0    If protocol passes may refill for 12 months if within 3 months of last provider visit (or a total of 15 months).

## 2021-06-12 NOTE — PROGRESS NOTES
Outpatient Substance Use Disorder Treatment - Initial Services Plan     Patient  Name: Oscar Mojica  MRN: [id number]   : 1960  Admit Date: 2020        Patient describes their immediate need: To learn recovery skills to prevent relapse, reports wanting to achieve his goals, stay sober.     Are there any immediate Safety Needs such as (physical, stability, mobility):  Pt is able to get medical care as needed. Pt denies immediate concerns.     Immediate Health Needs and Plan:   None reported    Vulnerable Adult: No     Issues to be addressed in the first sessions:   Getting use to video group.     Patient strengths and needs:   Strengths identified as reaching out for support, motivated, gets along with people.   Needs identified as skills and support.     Plan for patient for time between intake and completion of the treatment plan:   Attend all group therapy sessions as directed, complete all written and oral assignments as directed, and remain clean and sober. A relapse, if any, must be reported to staff immediately in order to ensure you are receiving the proper level of care. If you cannot attend a group therapy session you must call contact information provided in intake folder and leave a message before or during group hours.     Start group on 20.       Vulnerable Adult Review   [X] Review of the Facility Abuse Prevention plan was reviewed with the patient   [X] No Individual Abuse Plan is necessary   [ ] In addition to the Facility Abuse Prevention plan, an Individual Abuse Plan will be put in place       Staff Name/Title: DEBBIE Harvey, [facility]  Date: 11/3/2020  Time: 1:24 PM
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
## 2021-06-13 NOTE — PROGRESS NOTES
Telemedicine Visit: The patient's condition can be safely assessed and treated via synchronous audio and visual telemedicine encounter.      Reason for Telemedicine Visit: Services only offered telehealth    Originating Site (Patient Location): Patient's home    Distant Site (Provider Location): Provider Remote Setting- Home Office    Consent:  The patient/guardian has verbally consented to: the potential risks and benefits of telemedicine (video visit) versus in person care; bill my insurance or make self-payment for services provided; and responsibility for payment of non-covered services.     Mode of Communication:  Video Conference via Zoom    Call Started at: 9:00 AM  Call Ended at: 11:45 AM    As the provider I attest to compliance with applicable laws and regulations related to telemedicine.

## 2021-06-13 NOTE — PROGRESS NOTES
Oscar Mojica attended 3 hours of group today.     The group topic was Relapse, patient was responsive to topic.     Patient's engagement in the group session: medium     Total group size: 8      DEBBIE Harvey, MAKENNA and MATHEW Price  11/13/2020, 12:44 PM
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
## 2021-06-13 NOTE — PROGRESS NOTES
Telemedicine Visit: The patient's condition can be safely assessed and treated via synchronous audio and visual telemedicine encounter.      Reason for Telemedicine Visit: Services only offered telehealth    Originating Site (Patient Location): Patient's home    Distant Site (Provider Location): [facility]: [place]    Consent:  The patient/guardian has verbally consented to: the potential risks and benefits of telemedicine (video visit) versus in person care; bill my insurance or make self-payment for services provided; and responsibility for payment of non-covered services.     Mode of Communication:  Video Conference via Zoom    Call Started at: 09:00 AM  Call Ended at: 12:00 PM    As the provider I attest to compliance with applicable laws and regulations related to telemedicine.     Azathioprine Pregnancy And Lactation Text: This medication is Pregnancy Category D and isn't considered safe during pregnancy. It is unknown if this medication is excreted in breast milk.
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
## 2021-06-13 NOTE — PROGRESS NOTES
Oscar Mojica attended 2 hours of group today.     The group topic was Acceptance, patient was responsive to topic.     Patient's engagement in the group session: medium     Total group size: 6      MATHEW Asher and MATHEW Price  11/25/2020, 12:50 PM

## 2021-06-13 NOTE — PROGRESS NOTES
Oscar Mojica attended 3 hours of group today.     The group topic was Grief and Loss, patient was responsive to topic.     Patient's engagement in the group session: medium     Total group size: 7      MATHEW Asher and MATHEW Price  12/16/2020, 1:29 PM

## 2021-06-13 NOTE — PROGRESS NOTES
Telemedicine Visit: The patient's condition can be safely assessed and treated via synchronous audio and visual telemedicine encounter.      Reason for Telemedicine Visit: Services only offered telehealth    Originating Site (Patient Location): Patient's home    Distant Site (Provider Location): Provider Remote Setting- Home Office    Consent:  The patient/guardian has verbally consented to: the potential risks and benefits of telemedicine (video visit) versus in person care; bill my insurance or make self-payment for services provided; and responsibility for payment of non-covered services.     Mode of Communication:  Video Conference via Zoom    Call Started at: 9:30 AM   Call Ended at:  12 PM     As the provider I attest to compliance with applicable laws and regulations related to telemedicine.

## 2021-06-13 NOTE — PROGRESS NOTES
Oscar Mojica attended 2 hours of group today.     The group topic was Relapse, patient was responsive to topic.     Patient's engagement in the group session: medium     Total group size: 6      DEBBIE Harvey, MAKENNA and MATHEW Price  11/11/2020, 12:45 PM

## 2021-06-13 NOTE — PROGRESS NOTES
Addiction Outpatient Weekly Clinical Staffing      Name: Oscar Mojica   MRN: [id number]    Diagnosis: Stimulant Use Disorder, moderate, cocaine (F14.20)       Admit Date: 11/3/2020    Program:[facility]   Date: 12/02/2020     Present:  Ian Garcia [facility], Yuliana Toth, [facility], Dc Richmond, [facility], [facility], Alyssia Alexander, [facility], Alfie Campbell, [facility], [facility], Jeniffer Sanchez, [facility], Yelena Cabrales, [facility] and Donald Welander, [facility]         Dimension One:  1   Dimension Two:  1   Dimension Three:  2     Dimension Four:  0    Dimension Five: 3   Dimension Six:  2        Specific Treatment Plan Methods and Goals:  Patient to maintain abstinence throughout outpatient treatment        Specific Identified Strengths and/or Barriers:  Strengths identified as reaching out for support, motivated, gets along with people.   Needs identified as skills and support.      Developed Plan:  Patient's attendance appears to be improving. Patient reports that he has bought a new cell phone that will allow him to participate in Zoom Video group therapy.  Staff will continue to monitor, and have a 1 x 1 session if needed.    Date: 12/2/2020 Time: 1:19 PM          Staff Signature: MATHEW Price      Addiction Outpatient Weekly Clinical Staffing

## 2021-06-13 NOTE — PROGRESS NOTES
Telemedicine Visit: The patient's condition can be safely assessed and treated via synchronous audio and visual telemedicine encounter.      Reason for Telemedicine Visit: Services only offered telehealth    Originating Site (Patient Location): Patient's home    Distant Site (Provider Location): Provider Remote Setting- Home Office    Consent:  The patient/guardian has verbally consented to: the potential risks and benefits of telemedicine (video visit) versus in person care; bill my insurance or make self-payment for services provided; and responsibility for payment of non-covered services.     Mode of Communication:  Video Conference via Zoom    Call Started at: 9:30 AM  Call Ended at: 12:03 PM    As the provider I attest to compliance with applicable laws and regulations related to telemedicine.

## 2021-06-13 NOTE — PROGRESS NOTES
Telemedicine Visit: The patient's condition can be safely assessed and treated via synchronous audio and visual telemedicine encounter.      Reason for Telemedicine Visit: Services only offered telehealth    Originating Site (Patient Location): Patient's home    Distant Site (Provider Location): [facility]: [place]    Consent:  The patient/guardian has verbally consented to: the potential risks and benefits of telemedicine (video visit) versus in person care; bill my insurance or make self-payment for services provided; and responsibility for payment of non-covered services.     Mode of Communication:  Video Conference via Zoom    Call Started at: 09:00 AM  Call Ended at: 12:00 PM    As the provider I attest to compliance with applicable laws and regulations related to telemedicine.

## 2021-06-13 NOTE — PROGRESS NOTES
Telemedicine Visit: The patient's condition can be safely assessed and treated via synchronous audio and visual telemedicine encounter.      Reason for Telemedicine Visit: Services only offered telehealth    Originating Site (Patient Location): Patient's home    Distant Site (Provider Location): [facility]: [place]    Consent:  The patient/guardian has verbally consented to: the potential risks and benefits of telemedicine (video visit) versus in person care; bill my insurance or make self-payment for services provided; and responsibility for payment of non-covered services.     Mode of Communication:  Video Conference via Zoom    Call Started at: 09:00 AM  Call Ended at: 11:00 AM    As the provider I attest to compliance with applicable laws and regulations related to telemedicine.

## 2021-06-13 NOTE — PROGRESS NOTES
Oscar Mojica attended 3 hours of group today.     The group topic was Recovery Support, patient was responsive to topic.     Patient's engagement in the group session: medium     Total group size: 7      RIGOBERTO Maurer, [facility]  11/20/2020, 1:05 PM

## 2021-06-13 NOTE — PROGRESS NOTES
Oscar Mjoica attended 3 hours of group today.     The group topic was Sober Structure, patient was responsive to topic.     Patient's engagement in the group session: medium     Total group size: 8      MATHEW Price  11/16/2020, 12:18 PM

## 2021-06-13 NOTE — PROGRESS NOTES
Two email innitations to Zoom were sent out this morning, but patient did not show up to MICD group.    Yuliana Toth [facility] 12/14/2020  2:15 PM

## 2021-06-13 NOTE — PROGRESS NOTES
Oscar Mojica attended 3 hours of group today.     The group topic was Relationships, patient was responsive to topic.     Patient's engagement in the group session: medium     Total group size: 7      MATHEW Asher and MATHEW Price  12/2/2020, 3:29 PM

## 2021-06-13 NOTE — PROGRESS NOTES
Clinic Care Coordination Contact     Situation: Patient chart reviewed by care coordinator.     Background: Pts initial assessment and enrollment to Care Coordination was 5-.   Patient centered goals were developed with participation from patient.  SW CC handed patient off to CHW for continued outreach every 30 days.      Assessment: CHW has been in contact with patient monthly. Patient has made progress to goals.       Plan/Recommendations: CHW will involve SW CC as needed or if patient is ready to move to maintenance.  SW CC will continue to monitor progress to goals and CHW outreaches every 6 weeks.     Patient needs new assessment. CHW please schedule at next contact.      DANNY Ruiz  Clinic Care Coordinator  [phone]
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
## 2021-06-13 NOTE — PROGRESS NOTES
Oscar Mojica attended 3 hours of group today.     The group topic was Grief and Loss, patient was responsive to topic.     Patient's engagement in the group session: medium     Total group size: 5      RIGOBERTO Maurer, [facility]  12/18/2020, 12:06 PM

## 2021-06-13 NOTE — PROGRESS NOTES
Oscar Mojica attended 3 hours of group today.     The group topic was Mental Health, patient was responsive to topic.     Patient's engagement in the group session: medium     Total group size: 8      MATHEW Asher and MATHEW Price  12/9/2020, 12:25 PM

## 2021-06-13 NOTE — PROGRESS NOTES
Oscar Mojica attended 2 hours of group today.     The group topic was Relationships, patient was responsive to topic.     Patient's engagement in the group session: medium     Total group size: 6      MATHEW Asher and MATHEW Price  11/30/2020, 12:35 PM

## 2021-06-13 NOTE — PROGRESS NOTES
Telemedicine Visit: The patient's condition can be safely assessed and treated via synchronous audio and visual telemedicine encounter.      Reason for Telemedicine Visit: Services only offered telehealth    Originating Site (Patient Location): Patient's home    Distant Site (Provider Location): [facility]: [place]    Consent:  The patient/guardian has verbally consented to: the potential risks and benefits of telemedicine (video visit) versus in person care; bill my insurance or make self-payment for services provided; and responsibility for payment of non-covered services.     Mode of Communication:  Video Conference via Zoom    Call Started at: 09:30 AM  Call Ended at: 12:10 PM    As the provider I attest to compliance with applicable laws and regulations related to telemedicine.

## 2021-06-13 NOTE — PROGRESS NOTES
Telemedicine Visit: The patient's condition can be safely assessed and treated via synchronous audio and visual telemedicine encounter.      Reason for Telemedicine Visit: Services only offered telehealth    Originating Site (Patient Location): Patient's home    Distant Site (Provider Location): [facility]: [place]    Consent:  The patient/guardian has verbally consented to: the potential risks and benefits of telemedicine (video visit) versus in person care; bill my insurance or make self-payment for services provided; and responsibility for payment of non-covered services.     Mode of Communication:  Video Conference via Zoom    Call Started at: 09:00 AM  Call Ended at: 12:00 PM    As the provider I attest to compliance with applicable laws and regulations related to telemedicine.

## 2021-06-13 NOTE — PROGRESS NOTES
Telemedicine Visit: The patient's condition can be safely assessed and treated via synchronous audio and visual telemedicine encounter.      Reason for Telemedicine Visit: Services only offered telehealth    Originating Site (Patient Location): Patient's home    Distant Site (Provider Location): [facility]: [place]    Consent:  The patient/guardian has verbally consented to: the potential risks and benefits of telemedicine (video visit) versus in person care; bill my insurance or make self-payment for services provided; and responsibility for payment of non-covered services.     Mode of Communication:  Video Conference via Zoom    Call Started at: 09:00 AM  Call Ended at: 12:00 PM    As the provider I attest to compliance with applicable laws and regulations related to telemedicine.

## 2021-06-13 NOTE — PROGRESS NOTES
Clinic Care Coordination Contact    Community Health Worker Follow Up    Goals:   Goals Addressed                 This Visit's Progress       General      Mental Health Management (pt-stated)   70%     Goal Statement: I want to reestablish my mental health and substance use supports as soon as possible  Date Goal set: 2/12/2020  Barriers: Had lack of motivation  Strengths: History of attending appointments, knowledge of resources  Date to Achieve By: 3/12/2020  Patient expressed understanding of goal: Yes  Action steps to achieve this goal:  1. I will work with my [facility] worker to schedule an appointment  2. I will call my psychiatrist to schedule an appointment  3. I will attend substance use support groups regularly  4. I will let [facility] team know if I need assistance in managing this goal  10-  has [facility] worker.  discussed  11-4-2020 attending outpatient SUB treatment at [facility] (pt-stated)   70%     Goal Statement: I would like assistance with remembering appointments within the next 4 months.  Date Goal set: 7/15/2020  Barriers: Poor memory  Strengths: Ability to follow through on written instructions.  Date to Achieve By: 12/15/2020  Patient expressed understanding of goal: Yes  Action steps to achieve this goal:  1. I will write my appointments on the calendar  2. I will request CHW to mail calendar of appointments when back in the clinic setting.  3. I will call to reschedule appointments that I can't make instead of just not showing.    10- discussed.            CHW Next Follow-up: 1 month    CHW Plan: CHW will continue to support patient with goals through routine outreach.    Patient is going to continue to work with [facility] SW for resources for in home services.    Patient has been working with his [place] worker to place applications for other housing options.   Continues to attend chemical dependency appointments.    Next outreach due: 12/15/2020    Next outreach due:

## 2021-06-13 NOTE — PROGRESS NOTES
Telemedicine Visit: The patient's condition can be safely assessed and treated via synchronous audio and visual telemedicine encounter.      Reason for Telemedicine Visit: Services only offered telehealth    Originating Site (Patient Location): Patient's home    Distant Site (Provider Location): [facility]: [place]    Consent:  The patient/guardian has verbally consented to: the potential risks and benefits of telemedicine (video visit) versus in person care; bill my insurance or make self-payment for services provided; and responsibility for payment of non-covered services.     Mode of Communication:  Video Conference via Zoom    Call Started at: 09:30 AM  Call Ended at: 12:06 PM    As the provider I attest to compliance with applicable laws and regulations related to telemedicine.
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
## 2021-06-13 NOTE — PROGRESS NOTES
Oscar Mojica attended 3 hours of group today.     The group topic was Sober Structure, patient was responsive to topic.     Patient's engagement in the group session: medium     Total group size: 8      MATHEW Asher and MATHEW Price  11/18/2020, 12:37 PM
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
## 2021-06-13 NOTE — PROGRESS NOTES
Telemedicine Visit: The patient's condition can be safely assessed and treated via synchronous audio and visual telemedicine encounter.      Reason for Telemedicine Visit: Services only offered telehealth    Originating Site (Patient Location): Patient's home    Distant Site (Provider Location): [facility]: [place]    Consent:  The patient/guardian has verbally consented to: the potential risks and benefits of telemedicine (video visit) versus in person care; bill my insurance or make self-payment for services provided; and responsibility for payment of non-covered services.     Mode of Communication:  Video Conference via Zoom    Call Started at: 10:00 AM  Call Ended at: 12:00 PM    As the provider I attest to compliance with applicable laws and regulations related to telemedicine.

## 2021-06-13 NOTE — PROGRESS NOTES
Telemedicine Visit: The patient's condition can be safely assessed and treated via synchronous audio and visual telemedicine encounter.      Reason for Telemedicine Visit: Services only offered telehealth    Originating Site (Patient Location): Patient's home    Distant Site (Provider Location): [facility]: [place]    Consent:  The patient/guardian has verbally consented to: the potential risks and benefits of telemedicine (video visit) versus in person care; bill my insurance or make self-payment for services provided; and responsibility for payment of non-covered services.     Mode of Communication:  Video Conference via Zoom    Call Started at: 09:00 AM  Call Ended at: 11:00 AM    As the provider I attest to compliance with applicable laws and regulations related to telemedicine.

## 2021-06-13 NOTE — PROGRESS NOTES
Oscar Mojica attended 3 hours of group on 12/21/2020.     The group topic was Strengh-Based Recovery, patient was responsive to topic.     Patient's engagement in the group session: high     Total number of patients present 5.     Counselor(s) present: MATHEW Asher and MATHEW Price    Supervising MD: MATHEW Owens  12/21/2020, 1:18 PM

## 2021-06-14 NOTE — PROGRESS NOTES
Oscar Mojica attended 3 hours of group today.     The group topic was Feelings/Emotions, patient was responsive to topic.     Patient's engagement in the group session: medium     Total group size: 3      MATHEW Price  1/13/2021, 12:18 PM
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
## 2021-06-14 NOTE — PROGRESS NOTES
Telemedicine Visit: The patient's condition can be safely assessed and treated via synchronous audio and visual telemedicine encounter.      Reason for Telemedicine Visit: Services only offered telehealth    Originating Site (Patient Location): Patient's home    Distant Site (Provider Location): [facility]: [place]    Consent:  The patient/guardian has verbally consented to: the potential risks and benefits of telemedicine (video visit) versus in person care; bill my insurance or make self-payment for services provided; and responsibility for payment of non-covered services.     Mode of Communication:  Video Conference via Zoom    Call Started at: 09:30 AM  Call Ended at: 12:05 PM    As the provider I attest to compliance with applicable laws and regulations related to telemedicine.

## 2021-06-14 NOTE — PROGRESS NOTES
Telemedicine Visit: The patient's condition can be safely assessed and treated via synchronous audio and visual telemedicine encounter.      Reason for Telemedicine Visit: Services only offered telehealth    Originating Site (Patient Location): Patient's home    Distant Site (Provider Location): [facility]: [place]    Consent:  The patient/guardian has verbally consented to: the potential risks and benefits of telemedicine (video visit) versus in person care; bill my insurance or make self-payment for services provided; and responsibility for payment of non-covered services.     Mode of Communication:  Video Conference via Zoom    Call Started at: 09:30 AM  Call Ended at: 11:15 AM    As the provider I attest to compliance with applicable laws and regulations related to telemedicine.

## 2021-06-14 NOTE — TELEPHONE ENCOUNTER
Refill Approved    Rx renewed per Medication Renewal Policy. Medication was last renewed on 12/8/19.    Kaya Flores, [facility] Connection Triage/Med Refill 12/23/2020     Requested Prescriptions   Pending Prescriptions Disp Refills     hydroCHLOROthiazide (HYDRODIURIL) 25 MG tablet [Pharmacy Med Name: HYDROCHLOROTHIAZIDE 25MG TABLETS] 90 tablet 3     Sig: TAKE 1 TABLET(25 MG) BY MOUTH DAILY       Diuretics/Combination Diuretics Refill Protocol  Passed - 12/21/2020  3:15 AM        Passed - Visit with PCP or prescribing provider visit in past 12 months     Last office visit with prescriber/PCP: 10/13/2020 Chele Hodge MD OR same dept: 10/13/2020 Chele Hodge MD OR same specialty: 10/13/2020 Chele Hodge MD  Last physical: 9/18/2019 Last MTM visit: Visit date not found   Next visit within 3 mo: Visit date not found  Next physical within 3 mo: Visit date not found  Prescriber OR PCP: Chele Hodge MD  Last diagnosis associated with med order: 1. Essential hypertension  - hydroCHLOROthiazide (HYDRODIURIL) 25 MG tablet [Pharmacy Med Name: HYDROCHLOROTHIAZIDE 25MG TABLETS]; TAKE 1 TABLET(25 MG) BY MOUTH DAILY  Dispense: 90 tablet; Refill: 3    If protocol passes may refill for 12 months if within 3 months of last provider visit (or a total of 15 months).             Passed - Serum Potassium in past 12 months      Lab Results   Component Value Date    Potassium 3.8 06/24/2020             Passed - Serum Sodium in past 12 months      Lab Results   Component Value Date    Sodium 136 06/24/2020             Passed - Blood pressure on file in past 12 months     BP Readings from Last 1 Encounters:   10/13/20 162/74             Passed - Serum Creatinine in past 12 months      Creatinine   Date Value Ref Range Status   06/24/2020 1.16 0.70 - 1.30 mg/dL Final

## 2021-06-14 NOTE — PROGRESS NOTES
Telemedicine Visit: The patient's condition can be safely assessed and treated via synchronous audio and visual telemedicine encounter.      Reason for Telemedicine Visit: Services only offered telehealth    Originating Site (Patient Location): Patient's home    Distant Site (Provider Location): [facility]: [place]'s    Consent:  The patient/guardian has verbally consented to: the potential risks and benefits of telemedicine (video visit) versus in person care; bill my insurance or make self-payment for services provided; and responsibility for payment of non-covered services.     Mode of Communication:  Video Conference via Zoom    Call Started at: 09:30 AM  Call Ended at: 12:05 PM    As the provider I attest to compliance with applicable laws and regulations related to telemedicine.

## 2021-06-14 NOTE — PROGRESS NOTES
Oscar Mojica attended 3 hours of group today.     The group topic was Strengths, patient was responsive to topic.     Patient's engagement in the group session: medium     Total group size: 4      MATHEW Price  12/23/2020, 12:29 PM

## 2021-06-14 NOTE — PROGRESS NOTES
Oscar Mojica attended 3 hours of group today.     The group topic was Wellness, patient was responsive to topic.     Patient's engagement in the group session: medium     Total group size: 5      MATHEW Price  1/4/2021, 1:16 PM

## 2021-06-14 NOTE — TELEPHONE ENCOUNTER
Left VM responding to message Pt left for counselor at 10:06am indicating he was only 5 minutes late to group this morning and unable to join via Zoom. Informed Pt that group began at 9:30am and that due to low census today counselor was holding individual sessions instead of group. However, due to counselor being logged into Zoom for the individual sessions, Pt's attempt to join should have shown up. Counselor apologized for the difficulty Pt had and offered to connect with him some afternoon next week to troubleshoot any technical difficulties prior to our next scheduled group on Friday, 1/15. Counselor also reminded Pt that the best way to contact counselor during group times is by email.

## 2021-06-14 NOTE — PROGRESS NOTES
Oscar Mojica attended 3 hours of group today.     The group topic was Thinking, patient was responsive to topic.     Patient's engagement in the group session: medium     Total group size: 3      MATHEW Price  1/18/2021, 12:32 PM

## 2021-06-14 NOTE — PROGRESS NOTES
Clinic Care Coordination Contact  [facility]/Voicemail       Clinical Data: Care Coordinator Outreach  Outreach attempted x 2.  Left message on patient's voicemail with call back information and requested return call.  Plan: Care Coordinator will send unable to contact letter with care coordinator contact information via mail. Care Coordinator will try to reach patient again in 10 business days.    Next outreach due: 2/2/21

## 2021-06-14 NOTE — PROGRESS NOTES
Oscar Mojica attended 3 hours of group today.     The group topic was Wellness, patient was responsive to topic.     Patient's engagement in the group session: medium     Total group size: 3      MATHEW Price  1/6/2021, 12:50 PM

## 2021-06-14 NOTE — PROGRESS NOTES
Clinic Care Coordination Contact  [facility]/Voicemail       Clinical Data: Care Coordinator Outreach  Messages have been left multiple times since last contact on 11-9. No return calls.  Plan: Care Coordinator will send disenrollment letter with care coordinator contact information via [employer]. Care Coordinator will do no further outreaches at this time.  Merry Mora SAMUEL  Clinic Care Coordinator  [phone]

## 2021-06-14 NOTE — PROGRESS NOTES
Addiction Outpatient Weekly Clinical Staffing      Name: Oscar Mojica   MRN: [id number]    Diagnosis: Stimulant Use Disorder, moderate, cocaine (F14.20)       Admit Date: 11/3/2020    Program:[facility]   Date: 01/20/2021    Present: MATHEW Piper, Yuliana Toth [facility], Alyssia Alexander [facility], Dc Abel, [facility], Christina Sanchez, [facility], Trinidad Woo [facility], Bill Welander, [facility]    Dimension One:  1   Dimension Two:  1   Dimension Three:  2     Dimension Four:  0    Dimension Five: 3   Dimension Six:  2        Specific Treatment Plan Methods and Goals:  Patient to maintain abstinence throughout outpatient treatment, maintain stable mental health       Specific Identified Strengths and/or Barriers:  Strengths identified as reaching out for support, motivated, gets along with people.   Needs identified as skills and support.      Developed Plan:  Encourage continuous attendance and participation. Patient will be transferred to Service Coordination.  Staff may have 1 x 1 session if needed.    Date: 01/23/2021 Time: 11:08 AM          Staff Signature: MATHEW Price

## 2021-06-14 NOTE — PROGRESS NOTES
Telemedicine Visit: The patient's condition can be safely assessed and treated via synchronous audio and visual telemedicine encounter.      Reason for Telemedicine Visit: Services only offered telehealth    Originating Site (Patient Location): Patient's home    Distant Site (Provider Location): [facility]: [place]'s    Consent:  The patient/guardian has verbally consented to: the potential risks and benefits of telemedicine (video visit) versus in person care; bill my insurance or make self-payment for services provided; and responsibility for payment of non-covered services.     Mode of Communication:  Video Conference via Zoom    Call Started at: 09:30 AM  Call Ended at: 12:04 PM    As the provider I attest to compliance with applicable laws and regulations related to telemedicine.

## 2021-06-14 NOTE — PROGRESS NOTES
Clinic Care Coordination Contact  [facility]/Voicemail       Clinical Data: Care Coordinator Outreach  Outreach attempted x 2.  Left message on patient's voicemail with call back information and requested return call. Last contact with patient on 11-4.  CHW has left one message a week ago.    Patient has been attending SUB treatment.    Plan: If no call back from patient, will send disenrollment letter in 10 business days.    DANNY Ruiz  Clinic Care Coordinator  [phone]

## 2021-06-14 NOTE — PROGRESS NOTES
Telemedicine Visit: The patient's condition can be safely assessed and treated via synchronous audio and visual telemedicine encounter.      Reason for Telemedicine Visit: Services only offered telehealth    Originating Site (Patient Location): Patient's home    Distant Site (Provider Location): [facility]: [place]'s    Consent:  The patient/guardian has verbally consented to: the potential risks and benefits of telemedicine (video visit) versus in person care; bill my insurance or make self-payment for services provided; and responsibility for payment of non-covered services.     Mode of Communication:  Video Conference via Zoom    Call Started at: 09:30 AM  Call Ended at: 12:10 PM    As the provider I attest to compliance with applicable laws and regulations related to telemedicine.

## 2021-06-14 NOTE — PROGRESS NOTES
Telemedicine Visit: The patient's condition can be safely assessed and treated via synchronous audio and visual telemedicine encounter.      Reason for Telemedicine Visit: Services only offered telehealth    Originating Site (Patient Location): Patient's home    Distant Site (Provider Location): [facility]: [place]'s    Consent:  The patient/guardian has verbally consented to: the potential risks and benefits of telemedicine (video visit) versus in person care; bill my insurance or make self-payment for services provided; and responsibility for payment of non-covered services.     Mode of Communication:  Video Conference via Zoom    Call Started at: 09:30 AM  Call Ended at: 12:05 PM    As the provider I attest to compliance with applicable laws and regulations related to telemedicine.

## 2021-06-14 NOTE — PROGRESS NOTES
Addiction Outpatient Weekly Clinical Staffing      Name: Oscar Mojica   MRN: [id number]    Diagnosis: Stimulant Use Disorder, moderate, cocaine (F14.20)       Admit Date: 11/3/2020    Program:[facility]   Date: 01/06/2021    Present:  MATHEW Piper, Yuliana Toth [facility], Dc Richmond, [facility], [facility], Alyssia Alexander, [facility], Jeniffer Sanchez, [facility], Yelena Cabrales, [facility]         Dimension One:  1   Dimension Two:  1   Dimension Three:  2     Dimension Four:  0    Dimension Five: 3   Dimension Six:  2        Specific Treatment Plan Methods and Goals:  Patient to maintain abstinence throughout outpatient treatment, maintain stable mental health       Specific Identified Strengths and/or Barriers:  Strengths identified as reaching out for support, motivated, gets along with people.   Needs identified as skills and support.      Developed Plan:  Encourage continuous attendance and participation. Patient reports that he has bought a new computer for Zoom AA Meetings.  Staff may have 1 x 1 session if needed.    Date: 01/07/2021 Time: 10:03 AM          Staff Signature: MATHEW Price      Addiction Outpatient Weekly Clinical Staffing
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
## 2021-06-14 NOTE — PROGRESS NOTES
"Counselor contacted Pt by phone twice at the beginning of group to try to assist with his connecting via Zoom. Pt was unclear to his current email address, initially indicating the address on file ([email or web address]) was incorrect and directing this writer to use [email or web address] instead. However, the latter email address returned a \"no such address\" message. Pt was subsequently excused from programming today and directed to f/u with primary counselor on Monday, 1/18.   "

## 2021-06-14 NOTE — PROGRESS NOTES
Telemedicine Visit: The patient's condition can be safely assessed and treated via synchronous audio and visual telemedicine encounter.      Reason for Telemedicine Visit: Services only offered telehealth    Originating Site (Patient Location): Patient's home    Distant Site (Provider Location): [facility]: [place]'s    Consent:  The patient/guardian has verbally consented to: the potential risks and benefits of telemedicine (video visit) versus in person care; bill my insurance or make self-payment for services provided; and responsibility for payment of non-covered services.     Mode of Communication:  Video Conference via Zoom    Call Started at: 09:30 AM  Call Ended at: 12:05 PM    As the provider I attest to compliance with applicable laws and regulations related to telemedicine.

## 2021-06-14 NOTE — PROGRESS NOTES
Oscar Mojica attended 2 hours of group today.     The group topic was [facility] Service  Coordination, patient was responsive to topic.     Patient's engagement in the group session: medium     Total group size: 3      MATHEW Asher and MATHEW Price  2/1/2021, 3:44 PM

## 2021-06-14 NOTE — PROGRESS NOTES
Oscar Mojica attended 3 hours of group today.     The group topic was Strengths, patient was responsive to topic.     Patient's engagement in the group session: medium     Total group size: 4      MATHEW Price  12/28/2020, 12:25 PM

## 2021-06-14 NOTE — PROGRESS NOTES
Telemedicine Visit: The patient's condition can be safely assessed and treated via synchronous audio and visual telemedicine encounter.      Reason for Telemedicine Visit: Services only offered telehealth    Originating Site (Patient Location): Patient's home    Distant Site (Provider Location): [facility]: [place]'s    Consent:  The patient/guardian has verbally consented to: the potential risks and benefits of telemedicine (video visit) versus in person care; bill my insurance or make self-payment for services provided; and responsibility for payment of non-covered services.     Mode of Communication:  Video Conference via Zoom    Call Started at: 09:30 AM  Call Ended at: 12:10 PM    As the provider I attest to compliance with applicable laws and regulations related to telemedicine.

## 2021-06-14 NOTE — PROGRESS NOTES
Office Visit - Follow Up   Oscar Mojica   60 y.o. male    Date of Visit: 2/2/2021    Chief Complaint   Patient presents with     Diabetes     follow up         Assessment and Plan   1. Type 2 diabetes mellitus without complication, without long-term current use of insulin (H)  Controlled. Last A1c was 6.1. Continue metformin. Reports he had his eye exam a month ago. I don't have his eye exam result. Foot exam is normal including monofilament test.   - Glycosylated Hemoglobin A1c  - Basic Metabolic Panel  - Microalbumin, Random Urine    2. Mixed hyperlipidemia  Controlled. Last lipids were good. Continue fenofibrate.   - Lipid Cascade  - Thyroid Stimulating Hormone (TSH)  - Hepatic Profile    3. Essential hypertension  Controlled. Today's bp is normal. Continue amlodipine, hydrochlorothiazide, losartan.   - HM2(CBC w/o Differential)  - Urinalysis-UC if Indicated    4. JANE (obstructive sleep apnea)  Followed by sleep clinic. Saw his sleep MD on 1/12/21. Being fitted with a new CPAP and mask.     5. Alcohol dependence in remission (H)  Now in remission. Reports he has not touched alcohol and crack for 3 months. Reinforced to him not to go back drinking alcohol and smoking crack.     6. Chronic pain of left knee  Has chronic pains. Wants to get left knee brace. Order for this was given to the patient.     7. Chronic midline low back pain without sciatica  Has chronic back pains. Uses a walker. Wants a new walker with big wheels. Order was given to the patient.     8. Colon cancer screening  Due for colon cancer screening. Will schedule colonoscopy.   - Ambulatory referral for Colonoscopy    The following high BMI interventions were performed this visit: encouragement to exercise and weight monitoring  Wants to try [employer].       Follow up in 3 months.      History of Present Illness   This 60 y.o. old male is here for follow up. Has diabetes controlled by metformin. Has hyperlipidemia mostly increased triglycerides now  controlled by fenofibrate. Has hypertension controlled by amlodipine, losartan and hydrochlorothiazide. Now in remission from alcohol and crack. Stopped using these since 3 months ago. Has chronic left knee and back pains. Uses a walker to ambulate. Saw his eye doctor recently. Denies other complaints.      Review of Systems   A 12 point comprehensive review of systems was negative except as noted..     Medications, Allergies and Problem List   Reviewed and updated             Chief Complaint   Diabetes (follow up )       Patient Profile   Social History     Social History Narrative    Single. No children. Under disability. Current cigarette smoker, 1/2 ppd. Non alcohol drinker.         Past Medical History   Patient Active Problem List   Diagnosis     Major depression     Essential hypertension     Left knee pain     Back pain     Mixed hyperlipidemia     Type 2 diabetes mellitus without complication, without long-term current use of insulin (H)     Morbid obesity (H)     Obstructive sleep apnea syndrome     Chemical abuse (H)     Alcohol dependence in remission (H)       Past Surgical History  He has no past surgical history on file.       Medications and Allergies   Current Outpatient Medications   Medication Sig     acetaminophen (TYLENOL) 325 MG tablet Take 2 tablets (650 mg total) by mouth every 6 (six) hours as needed for pain.     amLODIPine (NORVASC) 5 MG tablet TAKE 1 TABLET(5 MG) BY MOUTH DAILY     calcium, as carbonate, (TUMS) 200 mg calcium (500 mg) chewable tablet Chew 2 tablets as needed. Acid reflux     cholecalciferol, vitamin D3, (VITAMIN D3) 5,000 unit Tab Once daily     fenofibrate (TRICOR) 48 MG tablet TAKE 1 TABLET(48 MG) BY MOUTH DAILY     hydroCHLOROthiazide (HYDRODIURIL) 25 MG tablet TAKE 1 TABLET(25 MG) BY MOUTH DAILY     hydrocortisone (ANUCORT-HC) 25 mg suppository UNWRAP AND INSERT 1 SUPPOSITORY(25 MG) RECTALLY TWICE DAILY     hydrocortisone (PROCTOSOL HC) 2.5 % rectal cream Insert into  "the rectum 2 (two) times a day.     losartan (COZAAR) 100 MG tablet Take 1 tablet (100 mg total) by mouth daily.     meloxicam (MOBIC) 15 MG tablet TAKE 1 TABLET(15 MG) BY MOUTH DAILY     metFORMIN (GLUCOPHAGE) 500 MG tablet Take 1 tablet (500 mg total) by mouth 2 (two) times a day with meals. START BY TAKING 1 TABLET BY MOUTH ONCE DAILY WITH SUPPER FOR 3 WEEKS, THEN INCREASE TO TWICE DAILY     methadone (DOLOPHINE) 10 mg/5 mL solution Take 40 mg by mouth every morning.      multivitamin therapeutic tablet Take 1 tablet by mouth daily.     omeprazole (PRILOSEC) 20 MG capsule TAKE 1 CAPSULE BY MOUTH EVERY DAY 1 HOUR BEFORE A MEAL     polyethylene glycol (MIRALAX) 17 gram packet Take 1 packet (17 g total) by mouth daily.     potassium chloride (K-DUR,KLOR-CON) 20 MEQ tablet TAKE 1 TABLET BY MOUTH EVERY 24 HOURS     ibuprofen (ADVIL,MOTRIN) 600 MG tablet Take 1 tablet (600 mg total) by mouth every 6 (six) hours as needed for pain.     Allergies   Allergen Reactions     Metoprolol Other (See Comments)     Cocaine-induced Chest Pain  Cocaine-induced Chest Pain       Hay Fever And Allergy Relief      Penicillins Nausea Only        Family and Social History   No family history on file.     Social History     Tobacco Use     Smoking status: Current Every Day Smoker     Packs/day: 0.50     Types: Cigarettes     Smokeless tobacco: Never Used   Substance Use Topics     Alcohol use: Yes     Comment: 1/2 pint on 2/11/2020     Drug use: Yes     Types: \"Crack\" cocaine     Comment: 1 week sober         Physical Exam       Physical Exam  /62 (Patient Position: Sitting)   Pulse 74   Temp 99.1  F (37.3  C)   Wt 216 lb (98 kg)   SpO2 96%   BMI 40.81 kg/m    General appearance: alert, appears stated age, cooperative, no distress and moderately obese  Head: Normocephalic, without obvious abnormality, atraumatic  Eyes: conjunctivae/corneas clear. PERRL, EOM's intact. Fundi benign.  Throat: lips, mucosa, and tongue normal; teeth " and gums normal  Neck: no adenopathy, no carotid bruit, no JVD, supple, symmetrical, trachea midline and thyroid not enlarged, symmetric, no tenderness/mass/nodules  Lungs: clear to auscultation bilaterally  Heart: regular rate and rhythm, S1, S2 normal, no murmur, click, rub or gallop  Abdomen: soft, non-tender; bowel sounds normal; no masses,  no organomegaly  Extremities: Diabetic Foot Exam:  normal DP and PT pulses, no trophic changes or ulcerative lesions, normal sensory exam and normal monofilament exam  Skin: Skin color, texture, turgor normal. No rashes or lesions     Additional Information   Post Discharge Medication Reconciliation Status: discharge medications reconciled, continue medications without change      Chele Hodge MD  Internal Medicine  Contact me at [phone]     Additional Information   Current Outpatient Medications   Medication Sig     acetaminophen (TYLENOL) 325 MG tablet Take 2 tablets (650 mg total) by mouth every 6 (six) hours as needed for pain.     amLODIPine (NORVASC) 5 MG tablet TAKE 1 TABLET(5 MG) BY MOUTH DAILY     calcium, as carbonate, (TUMS) 200 mg calcium (500 mg) chewable tablet Chew 2 tablets as needed. Acid reflux     cholecalciferol, vitamin D3, (VITAMIN D3) 5,000 unit Tab Once daily     fenofibrate (TRICOR) 48 MG tablet TAKE 1 TABLET(48 MG) BY MOUTH DAILY     hydroCHLOROthiazide (HYDRODIURIL) 25 MG tablet TAKE 1 TABLET(25 MG) BY MOUTH DAILY     hydrocortisone (ANUCORT-HC) 25 mg suppository UNWRAP AND INSERT 1 SUPPOSITORY(25 MG) RECTALLY TWICE DAILY     hydrocortisone (PROCTOSOL HC) 2.5 % rectal cream Insert into the rectum 2 (two) times a day.     losartan (COZAAR) 100 MG tablet Take 1 tablet (100 mg total) by mouth daily.     meloxicam (MOBIC) 15 MG tablet TAKE 1 TABLET(15 MG) BY MOUTH DAILY     metFORMIN (GLUCOPHAGE) 500 MG tablet Take 1 tablet (500 mg total) by mouth 2 (two) times a day with meals. START BY TAKING 1 TABLET BY MOUTH ONCE DAILY WITH SUPPER FOR 3  "WEEKS, THEN INCREASE TO TWICE DAILY     methadone (DOLOPHINE) 10 mg/5 mL solution Take 40 mg by mouth every morning.      multivitamin therapeutic tablet Take 1 tablet by mouth daily.     omeprazole (PRILOSEC) 20 MG capsule TAKE 1 CAPSULE BY MOUTH EVERY DAY 1 HOUR BEFORE A MEAL     polyethylene glycol (MIRALAX) 17 gram packet Take 1 packet (17 g total) by mouth daily.     potassium chloride (K-DUR,KLOR-CON) 20 MEQ tablet TAKE 1 TABLET BY MOUTH EVERY 24 HOURS     ibuprofen (ADVIL,MOTRIN) 600 MG tablet Take 1 tablet (600 mg total) by mouth every 6 (six) hours as needed for pain.     Allergies   Allergen Reactions     Metoprolol Other (See Comments)     Cocaine-induced Chest Pain  Cocaine-induced Chest Pain       Hay Fever And Allergy Relief      Penicillins Nausea Only     Social History     Tobacco Use     Smoking status: Current Every Day Smoker     Packs/day: 0.50     Types: Cigarettes     Smokeless tobacco: Never Used   Substance Use Topics     Alcohol use: Yes     Comment: 1/2 pint on 2/11/2020     Drug use: Yes     Types: \"Crack\" cocaine     Comment: 1 week sober          Time: total time spent with the patient was 25 minutes of which >50% was spent in counseling and coordination of care     "

## 2021-06-14 NOTE — PROGRESS NOTES
Oscar Mojica attended 3 hours of group today.     The group topic was Stress Management, patient was responsive to topic.     Patient's engagement in the group session: medium     Total group size: 5      MATHEW Price  12/30/2020, 12:37 PM

## 2021-06-14 NOTE — PROGRESS NOTES
Telemedicine Visit: The patient's condition can be safely assessed and treated via synchronous audio and visual telemedicine encounter.      Reason for Telemedicine Visit: Services only offered telehealth    Originating Site (Patient Location): Patient's home    Distant Site (Provider Location): [facility] [place]    Consent:  The patient/guardian has verbally consented to: the potential risks and benefits of telemedicine (video visit) versus in person care; bill my insurance or make self-payment for services provided; and responsibility for payment of non-covered services.     Mode of Communication:  Video Conference via Zoom    Call Started at: 01:30 PM  Call Ended at: 03:15 PM    As the provider I attest to compliance with applicable laws and regulations related to telemedicine.

## 2021-06-14 NOTE — PROGRESS NOTES
Weekly Progress Note 12/24/2020--12/31/2020  Oscar Mojica     1960  [id number]      D) Pt attended 2/3 group sessions this week, with 1 excused absence due to [place] Day. Patient attended 0 individual sessions this week.  A) Staff facilitated groups and reviewed tx progress. [facility] for [facility]. R) No VAP needed at this time.     Any significant events, defines as events that impact patients relationship with others inside and outside of treatment: No  Indicate any changes or monitoring of physical or mental health problems: No  Indicate involvement by any outside supports: Yes: [facility] counselor, psychiatrist.   IAPP reviewed and modified as needed: NA    Pt working on the following dimensions:  Dimension #1 - Withdrawal Potential - Risk 1. Patient reports last use of crack on 10/18/20, alcohol as 10/18/20, and heroin 2 months ago. Patient reports that he is currently on [facility] program, current dose is 40mg and would likely experience withdrawal if taken off it it, no current symptoms.   Specific goals from treatment plan addressed this week:  Patient reported no substance use this week. Patient reported no changes to [facility] this week. Patient was not requested to complete a UA this week.   Effectiveness of strategies:  Strategies appear effective. Patient reports daily methadone use that seems to be effective. Patient reports that he has no withdrawal concerns, as long as he takes his methadone. Patient reports no new concerns in this area.    Dimension #2 - Biomedical - Risk 1. Patient reports physical health is currently stable. Patient has [employer] insurance. Patient has primary care provider. Patient is able to seek cares as needed.   Specific goals from treatment plan addressed this week:  Patient reported no changes to his physical health this week. Patient reported no changes to tobacco use this week.   Effectiveness of strategies:  Strategies appear effective. Patient reports ability to  access health care services as needed. Patient reports that the process to get his full dentures will be completed in the new year.    Dimension #3 - Emotional/Behavioral/Cognitive - Risk 2. Patient reports history of mental health diagnosis of depression, reports currently has a psychiatrist, takes medications inconsistently. Patient reports recently experiencing mental health symptoms. Patient reports a history of abuse and trauma. Patient denies suicidal ideation at this time.   Active interventions to stabilize mental health symptoms:  Patient met with counselor to complete brief DA.   Specific goals from treatment plan addressed this week:  Counselor and patient discussed the importance of taking MH medications as prescribed, patient identified that he tends to feel more irritable when he doesn't take his medications.   Effectiveness of strategies:  Strategies appear effective. Patient reports current mental health insurance and services. Patient rates his mental health at 8/10, due to the holidays; however maintains that he is doing fine.    Dimension #4 - Readiness for Change - Risk 0. Patient reports no external motivation for treatment, reports strong desire to be abstinent.   Specific goals from treatment plan addressed this week:  Patient attended 3/3 groups this week; sometimes leaves early to go to [facility]. Counselor encouraged patient to scheduled his weekly pickup earlier or on day when there isn't group.   Effectiveness of strategies:  Strategies appear effective. Patient is attending group as expected; 2 group sessions this week due to no group on Friday, 12/25/2020    Dimension #5 - Relapse Potential - Risk 3. Patient reports abstinence at intake for the past two weeks, reports having periods of abstinence in the past. Patient reports previous treatment experience. Patient reports being in old environments has led to use.  Specific goals from treatment plan addressed this week:  Patient  reported no use or urges this week, identified coming to group has benefited his recovery.   Effectiveness of strategies:  Strategies appear effective. Patient continues to deny any relapse/continued use, as he continues to report benefits of AA and other recovery meetings. Patient reports motivation to stay sober and enjoy his relationship with his family. Patient reports plan to attend more AA meetings during the holidays, as the way to stay busy and avoid thinking about using drugs.    Dimension #6 - Recovery Environment - Risk 2. Patient reports receiving SSI, lives alone in an apartment. Patient reports some social support, attending meetings weekly. Patient reports limited daily structure. Patient reports no legal concerns.   Specific goals from treatment plan addressed this week:  Patient reported plan to attend an evening recovery meeting on 11/4/20, reported plan to do some errands and laundry.   Effectiveness of strategies:  Strategies appear effective. Patient reports that he is self-referred to treatment and is motivated to increase awareness about addiction. Patient reports that he will be careful through the holidays to avoid any situations that may compromise his recovery process. Patient reports cooking recipes that his mother used to cook, and that is bringing him more andrade and happiness. Patient reports that he broke up with his girlfriend, and he is ready to move on without thinking much about her.    T) Treatment plan updated: Yes Patient notified and in agreement: Reviewed on 11/6/20.   Patient educated on Strengths and Stress Management. Patient has completed 51 of 120 program hours at this time. Patient is currently in phase 1. Projected discharge date is 5/3/21. Current discharge plan is  services and community supports.     ANA Piper, [facility]  12/31/2020, 11:17 AM       Weekly Educational Topics Date   1. Dual Diagnoses, week 1    2. Dual Diagnoses, week 2    3. Feelings/ Emotions     4. Thinking    5. Change/Acceptance 11/20, 11/25   6. Addiction 101 11/4   7. Relapse Prevention 11/9, 11/11   8. Recovery Support 11/16, 11/19   9. Relationships/Communication 11/30, 12/2,    10. Grief and Loss 12/9, 12/11   11. Strengths 12/21, 12/23,    12. Stress Management 12/28, 12/30,   13. Wellness

## 2021-06-14 NOTE — PROGRESS NOTES
Clinic Care Coordination Contact  [facility]/Voicemail       Clinical Data: Care Coordinator Outreach  Outreach attempted x 1.  Left message on patient's voicemail with call back information and requested return call.  Plan:  Care Coordinator will try to reach patient again in 3-5 business days.  Next outreach due: 1/11/21

## 2021-06-14 NOTE — PROGRESS NOTES
Weekly Progress Note: 01/21/2021--01/28/2021- Patient is transferred to Service Coordination; start date is 02/01/2021, at 12:30 PM.  Oscar Mojica     1960  [id number]      D) Pt attended 2 group sessions this week, with 0 excused absences. Patient attended 0 individual sessions this week.  A) Staff facilitated groups and reviewed tx progress. Assessed for [facility]. R) No VAP needed at this time. Patient was staffed on 01/20/2021    Any significant events, defines as events that impact patients relationship with others inside and outside of treatment: Patient is transferred to [facility] Service Coordination to continue treatment.  Indicate any changes or monitoring of physical or mental health problems: No  Indicate involvement by any outside supports: Yes: [facility] counselor, psychiatrist.   IAPP reviewed and modified as needed: NA    Pt working on the following dimensions:  Dimension #1 - Withdrawal Potential - Risk 1. Patient reports last use of crack on 10/18/20, alcohol as 10/18/20, and heroin 2 months ago. Patient reports that he is currently on [facility] program, current dose is 40mg and would likely experience withdrawal if taken off it it, no current symptoms.   Specific goals from treatment plan addressed this week:  Patient reported no substance use this week. Patient reported no changes to [facility] this week. Patient was not requested to complete a UA this week.   Effectiveness of strategies:  Strategies appear Effective. Patient reports daily methadone use that seems to be effective. Patient reports that he has no withdrawal concerns, as long as he takes his methadone. Patient reports taking his medications as directed.    Dimension #2 - Biomedical - Risk 1. Patient reports physical health is currently stable. Patient has [employer] insurance. Patient has primary care provider. Patient is able to seek cares as needed.   Specific goals from treatment plan addressed this week:  Patient reported no  changes to his physical health this week. Patient reported no changes to tobacco use this week.   Effectiveness of strategies:  Strategies appear effective. Patient reports current health resources, with ability to access health care as needed. Patient reports that he is taking his methadone and other medications as directed.    Dimension #3 - Emotional/Behavioral/Cognitive - Risk 2. Patient reports history of mental health diagnosis of depression, reports currently has a psychiatrist, takes medications inconsistently. Patient reports recently experiencing mental health symptoms. Patient reports a history of abuse and trauma. Patient denies suicidal ideation at this time.   Active interventions to stabilize mental health symptoms:  Patient met with counselor to complete brief DA.   Specific goals from treatment plan addressed this week:  Counselor and patient discussed the importance of taking MH medications as prescribed, patient identified that he tends to feel more irritable when he doesn't take his medications.   Effectiveness of strategies:  Strategies appear effective. Patient reports current mental health insurance and services. Patient reports resources, including methadone and Psychiatry.    Dimension #4 - Readiness for Change - Risk 0. Patient reports no external motivation for treatment, reports strong desire to be abstinent.   Specific goals from treatment plan addressed this week:  Patient attended 1/3 groups this week; sometimes leaves early to go to [facility]. Counselor encouraged patient to scheduled his weekly pickup earlier or on day when there isn't group.   Effectiveness of strategies:  Strategies appear effective. Patient is attending group as expected; with many AA meetings every week. Patient is transferred to [facility] Service Coordination to continue in Phase 3, beginning Monday, 2/1/2021.    Dimension #5 - Relapse Potential - Risk 3. Patient reports abstinence at intake for the past  two weeks, reports having periods of abstinence in the past. Patient reports previous treatment experience. Patient reports being in old environments has led to use.  Specific goals from treatment plan addressed this week:  Patient reported no use or urges this week, identified coming to group has benefited his recovery.   Effectiveness of strategies:  Strategies appear effective. Patient continues to deny any relapse/continued use, as he continues to report benefits of AA and other recovery meetings. Patient reports motivation to stay sober and enjoy his relationship with his family. Patient reported that he continues to attended more AA meetings, as the way to stay busy and avoid relapse. Patient reports 90-day-sober, as of 01/20/2021. Patient reports [facility] Worker as being very helpful to him.    Dimension #6 - Recovery Environment - Risk 2. Patient reports receiving SSI, lives alone in an apartment. Patient reports some social support, attending meetings weekly. Patient reports limited daily structure. Patient reports no legal concerns.   Specific goals from treatment plan addressed this week:  Patient reports attending recovery meetings regularly, and other errands to stay busy.  Effectiveness of strategies:  Strategies appear effective. Patient reports that he is self-referred to treatment and is motivated to increase awareness about addiction. Patient reports cooking recipes that his mother used to cook, and that is bringing him more andrade and happiness. Patient reports spending most of his time at AA meetings and other sober activities to help him remain focus. Patient reports stable housing and support from sober friends, and is motivated to remain sober. Patient is starting to take [employer] classes as the way to improve his educational opportunities.     T) Treatment plan updated: Yes Patient notified and in agreement: Reviewed on 11/6/20.   Patient educated on Change. Patient has completed 71 of 120 program hours  at this time. Patient is currently in phase 2. Projected discharge date is 5/3/21. Current discharge plan is  services and community supports.     ANA Piper, [facility]  1/28/2021, 12:07 PM       Weekly Educational Topics Date   1. Dual Diagnoses, week 1    2. Dual Diagnoses, week 2    3. Feelings/ Emotions 01/13   4. Thinking 01/18, 01/20   5. Change/Acceptance 11/20, 11/25, 1/25, 1/27   6. Addiction 101 11/4   7. Relapse Prevention 11/9, 11/11   8. Recovery Support 11/16, 11/19   9. Relationships/Communication 11/30, 12/2,    10. Grief and Loss 12/9, 12/11   11. Strengths 12/21, 12/23,    12. Stress Management 12/28, 12/30,   13. Wellness 01/04, 01/06,
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
## 2021-06-14 NOTE — PROGRESS NOTES
Oscar Mojica attended 3 hours of group today.     The group topic was Change, patient was responsive to topic.     Patient's engagement in the group session: medium     Total group size: 4      MATHEW Price  1/25/2021, 12:06 PM

## 2021-06-14 NOTE — PROGRESS NOTES
Office Visit-New Patient   Oscar Mojica   57 y.o. male    Date of Visit: 12/7/2017    Chief Complaint   Patient presents with     [facility] Care     moved to [place] from [place], jairo puga primary MD. Wants psych referral. Sober for 60 days from crack/cocaine and alcohol. On methadone daily. Wants physical therapy for knees. Saw ortho MD in the past for removing fluid from knees.         Assessment and Plan   1. Major depression  Has major depression.  Takes sertraline 100 mg daily.  Did his PHQ 9 and he scored 11 suggesting mild depression.  Wants to see a clinical therapist and subsequently psychiatrist.  Will refer him to psychotherapy for eval using our depression smart set.  - Referral for Psychotherapy Evaluation- Routine    2. Polysubstance abuse  Has history of polysubstance abuse including crack, cocaine and alcohol.  underwent inpatient treatment a month ago and  continuing with outpatient treatment 3 times a week for his polysubstance abuse.  Reports he has been sober now for 60 days from all substances he abused.    3. Essential hypertension  Controlled.  Continue hydrochlorothiazide 25 mg daily.  Was afraid his blood pressure was uncontrolled but BP today is 140/70 which is quite good.  Will continue to follow this.  Advised he does not need addition to or change of his BP med.    4. Left knee pain  Complains of chronic left knee pains for which he sees a chiropractor for adjustment of his left knee.  Has not had good relief of his pains from chiropractor treatment.  Wants to see orthopedics.  Exam of the left knee showed  mild swelling of the left knee but no other signs of inflammation and it has normal range of motion.  - Ambulatory referral to Orthopedics    Follow up in 6 weeks.  Advised to fast next visit.     History of Present Illness   This 57 y.o. old male new to our practice and wants to establish care with me.  Complains of left knee pains.  Left knee pains  have been going on for  more than a year.  Saw chiropractor and still being followed by chiropractor for manipulation or treatment.  But did not get any relief from this treatment.  Wants to get physical therapy.  Has hypertension controlled by hydrochlorothiazide alone.  Has major depression.  Takes sertraline and it seems to be controlling his depression.  Has history of polysubstance abuse.  Underwent inpatient treatment a month ago.  Now continuing with outpatient treatment 3 times a week.  Also being followed by the methadone clinic for his polysubstance abuse and taking methadone daily.  Overall feels well.  No other complaints.    Review of Systems   A 12 point comprehensive review of systems was negative except as noted..     Medications, Allergies and Problem List   Reviewed and updated             Chief Complaint   Establish Care (moved to [place] from [place], [facility] primary MD. Wants psych referral. Sober for 60 days from crack/cocaine and alcohol. On methadone daily. Wants physical therapy for knees. Saw ortho MD in the past for removing fluid from knees. )       Patient Profile   Social History     Social History Narrative    Single. No children. Under disability.         Past Medical History   Patient Active Problem List   Diagnosis     Major depression     Essential hypertension     Left knee pain       Past Surgical History  He has no past surgical history on file.       Medications and Allergies   Current Outpatient Prescriptions   Medication Sig     hydroCHLOROthiazide (HYDRODIURIL) 25 MG tablet Take 25 mg by mouth daily.     sertraline (ZOLOFT) 100 MG tablet Take 100 mg by mouth at bedtime.     potassium chloride SA (K-DUR,KLOR-CON) 20 MEQ tablet TK 1 T PO Q 24 H     Allergies   Allergen Reactions     Penicillins Nausea Only        Family and Social History   No family history on file.     Social History   Substance Use Topics     Smoking status: Current Every Day Smoker     Packs/day: 0.50     Types: Cigarettes      Smokeless tobacco: None     Alcohol use No        Physical Exam       Physical Exam  General appearance: alert, appears stated age, cooperative and no distress  Head: Normocephalic, without obvious abnormality, atraumatic  Throat: lips, mucosa, and tongue normal; teeth and gums normal  Neck: no adenopathy, no carotid bruit, no JVD, supple, symmetrical, trachea midline and thyroid not enlarged, symmetric, no tenderness/mass/nodules  Lungs: clear to auscultation bilaterally  Heart: regular rate and rhythm, S1, S2 normal, no murmur, click, rub or gallop  Abdomen: soft, non-tender; bowel sounds normal; no masses,  no organomegaly  Extremities: extremities normal, atraumatic, no cyanosis or edema  Skin: Skin color, texture, turgor normal. No rashes or lesions  Neurologic: Grossly normal  Meniscus skeletal: Left knee mildly swollen  and tender, normal range of motion     Additional Information        Chele Hodge MD  Internal Medicine  Contact me at [phone]     Additional Information   Current Outpatient Prescriptions   Medication Sig     hydroCHLOROthiazide (HYDRODIURIL) 25 MG tablet Take 25 mg by mouth daily.     sertraline (ZOLOFT) 100 MG tablet Take 100 mg by mouth at bedtime.     potassium chloride SA (K-DUR,KLOR-CON) 20 MEQ tablet TK 1 T PO Q 24 H     Allergies   Allergen Reactions     Penicillins Nausea Only     Social History   Substance Use Topics     Smoking status: Current Every Day Smoker     Packs/day: 0.50     Types: Cigarettes     Smokeless tobacco: None     Alcohol use No     Time spent: 45 minutes, 50% of which was spent on counseling and coordinating managing his medical care.

## 2021-06-14 NOTE — PROGRESS NOTES
Oscar Mojica attended 2 hours of group today.     The group topic was Change, patient was responsive to topic.     Patient's engagement in the group session: medium     Total group size: 3      MATHEW Price  1/27/2021, 12:12 PM

## 2021-06-14 NOTE — PROGRESS NOTES
Telemedicine Visit: The patient's condition can be safely assessed and treated via synchronous audio and visual telemedicine encounter.      Reason for Telemedicine Visit: Services only offered telehealth    Originating Site (Patient Location): Patient's home    Distant Site (Provider Location): [facility]: [place]    Consent:  The patient/guardian has verbally consented to: the potential risks and benefits of telemedicine (video visit) versus in person care; bill my insurance or make self-payment for services provided; and responsibility for payment of non-covered services.     Mode of Communication:  Video Conference via Zoom    Call Started at: 09:30 AM  Call Ended at: 12:15 PM    As the provider I attest to compliance with applicable laws and regulations related to telemedicine.

## 2021-06-14 NOTE — PROGRESS NOTES
Oscar Mojica attended 3 hours of group today.     The group topic was Thinking, patient was responsive to topic.     Patient's engagement in the group session: medium     Total group size: 3      MATHEW Price  1/20/2021, 12:40 PM

## 2021-06-14 NOTE — PROGRESS NOTES
Telemedicine Visit: The patient's condition can be safely assessed and treated via synchronous audio and visual telemedicine encounter.      Reason for Telemedicine Visit: Services only offered telehealth    Originating Site (Patient Location): Patient's home    Distant Site (Provider Location): [facility]: [facility]    Consent:  The patient/guardian has verbally consented to: the potential risks and benefits of telemedicine (video visit) versus in person care; bill my insurance or make self-payment for services provided; and responsibility for payment of non-covered services.     Mode of Communication:  Video Conference via Zoom    Call Started at: 09:30 AM  Call Ended at: 12:06 PM    As the provider I attest to compliance with applicable laws and regulations related to telemedicine.
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
## 2021-06-15 NOTE — TELEPHONE ENCOUNTER
Staff initiated contact with patient on 2/4/2021, to remind him about group when he did not log in after group had been on for a while. Patient answered his phone and said he was busy and had many things to do. Patient stated that he has been in treatment for more than 2 months and thinks that is enough for him. He stated that he did not need more than 2 months in treatment, and would prefer to continue his recovery by attending AA meetings because meetings work better for him. Patient agreed to an individual session on Monday, 2/8/2021, at 10:00 AM, to discuss his current needs and plan for recovery.    MATHEW Piper

## 2021-06-15 NOTE — TELEPHONE ENCOUNTER
Writer initiated contact with patient four times via telephone, due to not showing up for a scheduled individual session today at 10:00 AM, and a group session at 12:30 PM. Patient was called 4 times with 3 voicemail messages letf, but he did not answered. Patient will be contacted to reschedule an individual session as needed.    MATHEW Piper

## 2021-06-15 NOTE — PROGRESS NOTES
This is patient's first diagnostic intake session. Insufficient information was collected during this encounter to complete a full diagnostic assessment. Presented at half time his scheduled time. Then needed some orientation about the process to get  Services ( therapy and psychiatry) which took some time as he could not decide whether he will get help he needed.  Patient presented to session following a referral by his PCP,   with [facility]. Patient  Reported in history of depression and substance use. He signed a DARRIAN of his previous physiatrist in the community with [facility]. He is also being seen by [facility] for Methadone maintenance.  Patient signed his inform consent today. He plans to return on March 5th to complete his DA and be able to schedule for a psychiatrist. He was given another package for his intake paper work since he never received the first one. Will complete paperwork prior this appt.  Meanwhile, patient  Is recommended to see his PCP for his sertraline refill.  Performed and documented by GLENNY Dias- RIGOBERTO; [facility] 1/30/2018

## 2021-06-15 NOTE — TELEPHONE ENCOUNTER
RN cannot approve Refill Request    RN can NOT refill this medication med is not covered by policy/route to provider. Last office visit: 2/2/2021 Chele Hodge MD Last Physical: 9/18/2019 Last MTM visit: Visit date not found Last visit same specialty: 2/2/2021 Chele Hodge MD.  Next visit within 3 mo: Visit date not found  Next physical within 3 mo: Visit date not found      Bertha Roblero, [facility] Triage/Med Refill 3/15/2021    Requested Prescriptions   Pending Prescriptions Disp Refills     diclofenac sodium (VOLTAREN) 1 % Gel [Pharmacy Med Name: DICLOFENAC 1% GEL 100GM] 300 g 0     Sig: APPLY 2 GRAMS TOPICALLY FOUR TIMES DAILY.       There is no refill protocol information for this order

## 2021-06-15 NOTE — TELEPHONE ENCOUNTER
Refill Approved    Rx renewed per Medication Renewal Policy. Medication was last renewed on 2/12/20.    Oliver Martel, [facility] Triage/Med Refill 3/10/2021     Requested Prescriptions   Pending Prescriptions Disp Refills     fenofibrate (TRICOR) 48 MG tablet [Pharmacy Med Name: FENOFIBRATE 48MG TABLETS] 90 tablet 2     Sig: TAKE 1 TABLET(48 MG) BY MOUTH DAILY       Fenofibrate Refill Protocol Passed - 3/10/2021  5:58 AM        Passed - Renal status in last 6 months     Creatinine   Date Value Ref Range Status   02/02/2021 1.24 0.70 - 1.30 mg/dL Final             Passed - Fasting lipid cascade in last 12 months     Cholesterol   Date Value Ref Range Status   02/02/2021 188 <=199 mg/dL Final     Triglycerides   Date Value Ref Range Status   02/02/2021 151 (H) <=149 mg/dL Final     HDL Cholesterol   Date Value Ref Range Status   02/02/2021 36 (L) >=40 mg/dL Final     LDL Calculated   Date Value Ref Range Status   02/02/2021 122 <=129 mg/dL Final     Patient Fasting > 8hrs?   Date Value Ref Range Status   02/02/2021 Yes  Final             Passed - AST or ALT in last 12 months     AST   Date Value Ref Range Status   02/02/2021 22 0 - 40 U/L Final     ALT   Date Value Ref Range Status   02/02/2021 14 0 - 45 U/L Final               Passed - PCP or prescribing provider visit in past 12 months       Last office visit with prescriber/PCP: 2/2/2021 Chele Hodge MD OR same dept: 10/13/2020 Chele Hodge MD OR same specialty: 2/2/2021 Chele Hodge MD  Last physical: 9/18/2019 Last MTM visit: Visit date not found   Next visit within 3 mo: Visit date not found  Next physical within 3 mo: Visit date not found  Prescriber OR PCP: Chele Hodge MD  Last diagnosis associated with med order: 1. Mixed hyperlipidemia  - fenofibrate (TRICOR) 48 MG tablet [Pharmacy Med Name: FENOFIBRATE 48MG TABLETS]; TAKE 1 TABLET(48 MG) BY MOUTH DAILY  Dispense: 90 tablet; Refill: 2    If protocol passes may refill for 12  months if within 3 months of last provider visit (or a total of 15 months).

## 2021-06-15 NOTE — PROGRESS NOTES
"  Office Visit - Follow Up   Oscar Mojica   57 y.o. male    Date of Visit: 1/18/2018    Chief Complaint   Patient presents with     Follow-up     Not fasting, had cortizone shot in L knee, has had no effect. Diclofenec gel has been effective for pain     Medication Problem     feeling \"irritable and bored in the afternoon\", not sure if it is methadone withdrawl or side effect of med     Psychiatric Eval     Has not set up psych yet due to out patient treatment everyday         Assessment and Plan   1. Left knee pain  Still continues to complain of left knee pain despite getting worse injection the left knee on 1/9/2018 by orthopedics Dr. Cardona.  Assessed to have grade 4 osteoarthritis.  Advised to see Dr. Cardona for his left knee pains.  Will call on his own for his appointment.    2. Back pain  Also has chronic back pains.  Was seen by ortho, a nurse practitioner Jeannette on 1/15/2018.  Was assessed to have posttraumatic back pains with  radiculopathy.  From her notes she suggested doing the lumbar spine MRI but the patient reports he was not scheduled for this yet.  Advised to call ortho and ask to be scheduled for this.    3. Essential hypertension  Controlled.  Continue hydrochlorothiazide.  Takes potassium supplement for this diuretic.  Check CMP.  - Comprehensive Metabolic Panel    4. Major depression  Has major depression.  Takes sertraline.  Did his PHQ 9 and scored 11 suggesting mild depression still.  Needs psychiatry referral.  Will refer  him to psychiatry.  - Referral for Psychotherapy Evaluation- Routine    Follow up in 3 months.  To come back sooner for any concerns.     History of Present Illness   This 57 y.o. old male is here for his follow-up.  Has left knee pains and low back pains.  Was referred to orthopedics on his last visit.  Saw Dr. Pham for his knee pains.  Got cortisone injection to the left knee.  Did not get relief.  Also saw a nurse practitioner for his back pains.  Was advised to  " "on get a lumbar MRI which he reports he was not scheduled for this yet.  Still scales his knee and back pains to be moderate.  Has hypertension controlled by hydrochlorothiazide.  Takes potassium supplement for this diuretic.  Has major depression.  Needs to follow with psychiatry.  Takes sertraline.  Overall feels well.    Review of Systems   A 12 point comprehensive review of systems was negative except as noted..     Medications, Allergies and Problem List   Reviewed and updated             Chief Complaint   Follow-up (Not fasting, had cortizone shot in L knee, has had no effect. Diclofenec gel has been effective for pain); Medication Problem (feeling \"irritable and bored in the afternoon\", not sure if it is methadone withdrawl or side effect of med); and Psychiatric Eval (Has not set up psych yet due to out patient treatment everyday )       Patient Profile   Social History     Social History Narrative    Single. No children. Under disability.         Past Medical History   Patient Active Problem List   Diagnosis     Major depression     Essential hypertension     Left knee pain       Past Surgical History  He has no past surgical history on file.       Medications and Allergies   Current Outpatient Prescriptions   Medication Sig     diclofenac sodium (VOLTAREN) 1 % Gel APPLY 2 GRAMS TOPICALLY QID.     hydroCHLOROthiazide (HYDRODIURIL) 25 MG tablet Take 25 mg by mouth daily.     omeprazole (PRILOSEC) 20 MG capsule TK 1 C PO D 1 HOUR AC     potassium chloride SA (K-DUR,KLOR-CON) 20 MEQ tablet TK 1 T PO Q 24 H     sertraline (ZOLOFT) 100 MG tablet Take 100 mg by mouth at bedtime.     clotrimazole (LOTRIMIN) 1 % cream APPLY TOPICALLY TWICE DAILY     Allergies   Allergen Reactions     Hay Fever And Allergy Relief      Penicillins Nausea Only        Family and Social History   No family history on file.     Social History   Substance Use Topics     Smoking status: Current Every Day Smoker     Packs/day: 0.50     " "Types: Cigarettes     Smokeless tobacco: Never Used     Alcohol use No        Physical Exam       Physical Exam  /82  Pulse 81  Ht 5' 3\" (1.6 m)  Wt 199 lb (90.3 kg)  SpO2 94%  BMI 35.25 kg/m2  General appearance: alert, appears stated age, cooperative and no distress  Head: Normocephalic, without obvious abnormality, atraumatic  Throat: lips, mucosa, and tongue normal; teeth and gums normal  Neck: no adenopathy, no carotid bruit, no JVD, supple, symmetrical, trachea midline and thyroid not enlarged, symmetric, no tenderness/mass/nodules  Back: symmetric, no curvature. ROM normal. No CVA tenderness.  Lungs: clear to auscultation bilaterally  Heart: regular rate and rhythm, S1, S2 normal, no murmur, click, rub or gallop  Abdomen: soft, non-tender; bowel sounds normal; no masses,  no organomegaly  Extremities: extremities normal, atraumatic, no cyanosis or edema  Skin: Skin color, texture, turgor normal. No rashes or lesions  Musculoskeletal: Normal left knee and back exam     Additional Information        Chele Hodge MD  Internal Medicine  Contact me at [phone]     Additional Information   Current Outpatient Prescriptions   Medication Sig     diclofenac sodium (VOLTAREN) 1 % Gel APPLY 2 GRAMS TOPICALLY QID.     hydroCHLOROthiazide (HYDRODIURIL) 25 MG tablet Take 25 mg by mouth daily.     omeprazole (PRILOSEC) 20 MG capsule TK 1 C PO D 1 HOUR AC     potassium chloride SA (K-DUR,KLOR-CON) 20 MEQ tablet TK 1 T PO Q 24 H     sertraline (ZOLOFT) 100 MG tablet Take 100 mg by mouth at bedtime.     clotrimazole (LOTRIMIN) 1 % cream APPLY TOPICALLY TWICE DAILY     Allergies   Allergen Reactions     Hay Fever And Allergy Relief      Penicillins Nausea Only     Social History   Substance Use Topics     Smoking status: Current Every Day Smoker     Packs/day: 0.50     Types: Cigarettes     Smokeless tobacco: Never Used     Alcohol use No         Time: total time spent with the patient was 25 minutes of " which >50% was spent in counseling and coordination of care
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
## 2021-06-15 NOTE — TELEPHONE ENCOUNTER
"Writer initiated contact with patient to follow up on his missed individual session on 2/8/2021 at 10:00 AM. Patient states, \"I went to see my Chiropractor yesterday, I already told you that I was done with treatment. I am almost 4 months sober and that's enough for me. I just needed to get stable for 2 months, that's why I went into treatment. I am going to 4 months sober. I will just continue with meetings because they work better for me.\"  Writer will staff patient with treatment supervisor and consider a discharge.    Ian Garcia, MATHEW  "

## 2021-06-16 NOTE — PROGRESS NOTES
"3/27/2018    Start time: 10:02    Stop Time: 11:00  Session # 1    Oscar Mojica is a 57 y.o. male is being seen today for    Chief Complaint   Patient presents with      Follow Up      Treatment Plan     Anxiety     Depression     Addiction     Follow up in regards to ongoing symptom management of anxiety and depression.     New symptoms or complaints: \" I feel lonely and depressed\"    Functional Impairment:   Personal: 4  Family: 4  Social: 4  Work: 4    Clinical assessment of mental status:   Oscar Mojica presented on time.   He was oriented x3, open and cooperative, and dressed appropriately for this session and weather. His memory was Mild .  His speech was  Within normal and Delayed/Hesitant.  Language was appropriate.  Concentration and focus is Within normal. Psychosis is not noted or reported. He reports his mood is Depressed.  Affect is congruent with speech and is Anxious and Depressed.  Fund of knowledge is adequate. Insight is adequate for therapy.    Suicidal/Homicidal Ideation present: Patient denies suicidal and homicidal ideations/means or plans.     Patient's impression of their current status: Patient presented today to the clinic following his completion of the diagnostic assessment completed on March 5.  Patient reports no big change since then but has been expressing less anxiety and less depression. Reports he is challenged loneliness and the fact he has many appointments in the community that won't allow him to participate in group activities. Patient signed a DARRIAN for his new [facility] worker, Caden with [facility]. Writer and patient made a call to connect with Caden to assist with developing a goal around organizing his daily activities to include his appointments here in the clinic. Patient also developed a treatment plan that will be focusing on is anxiety, depression and sobriety. He wants to be seen weekly.     Therapist impression of patients current state: This 57 y.o. Black or "  male presented on time. He appeared in better affect compared with last session. Writer spent some time reviewing the results from the assessment and developing a treatment plan moving forward in therapy.  He was engaged in the process was insightful about his conditions and the need to learn coping skills. He will do well with some modalities that would include CBT, Motivational interviewing,  Patient's centered and relapse prevention.     Assessment tools used today include: NOREEN-7: 10, PHQ-9:14,CAGE-AID:4/4, PANSI: 3.17 positive; 1.88 negative; denied any past or current SI/HI    Type of psychotherapeutic technique provided: Client centered, CBT and rapport building, intial treatment plan    Progress toward short term goals:report feeling less depressed today    Review of long term goals: initial treatment plan: 3/27/2018 next review: 6/27/2018 .    Diagnosis:   1. Moderate single current episode of major depressive disorder    2. Generalized anxiety disorder    3. Tobacco use disorder    4. Heroin use disorder, severe, on maintenance therapy    5. Alcohol use disorder, severe, in early remission    6. Cannabis dependence in remission     no change    Plan and Follow-up:  1. Continue to develop a  therapeutic relationship with therapist  2. CBT and Mindfulness-based approaches for depression and anxiety.  3. Motivational Interviewing to increase client's therapeutic engagement and evoke motivation to make positive change.  4.Continue to work with your community providers as scheduled  5. Return in a week for therapy    Discharge Criteria/Planning: Patient will continue with follow-up until therapy can be discontinued without return of signs and symptoms.    Performed and documented by GLENNY Dias- RIGOBERTO; [facility] 3/27/2018
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
## 2021-06-16 NOTE — TELEPHONE ENCOUNTER
Caller has questions regarding water chamber on sleep apnea machine. Caller states he was going over instructions from 2 years ago on how to clean the machine. Triage guidelines recommend to homecare.  Caller verbalized and understands directives.  COVID 19 Nurse Triage Plan/Patient Instructions    Please be aware that novel coronavirus (COVID-19) may be circulating in the community. If you develop symptoms such as fever, cough, or SOB or if you have concerns about the presence of another infection including coronavirus (COVID-19), please contact your health care provider or visit  [email or web address].    Disposition/Instructions    Home care recommended. Follow home care protocol based instructions.    Thank you for taking steps to prevent the spread of this virus.  o Limit your contact with others.  o Wear a simple mask to cover your cough.  o Wash your hands well and often.    Resources    [facility]: About COVID-19: [email or web address]    CDC: What to Do If You're Sick: www.cdc.gov/coronavirus/2019-ncov/about/steps-when-sick.html    CDC: Ending Home Isolation: www.cdc.gov/coronavirus/2019-ncov/hcp/disposition-in-home-patients.html     CDC: Caring for Someone: www.cdc.gov/coronavirus/2019-ncov/if-you-are-sick/care-for-someone.html     [facility]: Interim Guidance for Hospital Discharge to Home: www.health.[facility].mn.us/diseases/coronavirus/hcp/hospdischarge.pdf    [facility] clinical trials (COVID-19 research studies): clinicalaffairs.[facility]/umn-clinical-trials     Below are the COVID-19 hotlines at the [facility] ([facility]). Interpreters are available.   o For health questions: Call 535-977-3715 or 1-886.705.2163 (7 a.m. to 7 p.m.)  o For questions about schools and childcare: Call 793-079-6635 or 1-405.232.8113 (7 a.m. to 7 p.m.)       Additional Information    Negative: [1] Caller is not with the adult (patient) AND [2] reporting urgent symptoms    Negative:  Lab result questions    Negative: Medication questions    Negative: Caller can't be reached by phone    Negative: Caller has already spoken to PCP or another triager    Negative: RN needs further essential information from caller in order to complete triage    Negative: Requesting regular office appointment    Negative: [1] Caller requesting NON-URGENT health information AND [2] PCP's office is the best resource    Health Information question, no triage required and triager able to answer question    Protocols used: INFORMATION ONLY CALL-A-AH

## 2021-06-16 NOTE — PROGRESS NOTES
Outpatient Mental Health Treatment Plan    Name:  Oscar Mojica  :  1960  MRN:  [id number]    Treatment Plan:  Updated Treatment Plan  Intake/initial treatment plan date:  3/27/2018  Benefit and risks and alternatives have been discussed: Yes  Is this treatment appropriate with minimal intrusion/restrictions: Yes  Estimated duration of treatment:  10 +  Anticipated frequency of services:  Every  week  Necessity for frequency: This frequency is needed to establish therapeutic goals and for continuity of care in order to monitor progress.  Necessity for treatment: To address cognitive, behavioral, and/or emotional barriers in order to work toward goals and to improve quality of life.      Plan:         ?   ? Anxiety    Goal:  Decrease average anxiety level from 4 to 3.   Strategies: ? [x]Learn and practice relaxation techniques and other coping strategies (e.g., thought stopping, reframing, meditation)     ? [x] Increase involvement in meaningful activities     ? [x] Discuss sleep hygiene     ? [x] Explore thoughts and expectations about self and others     ? [x] Identify and monitor triggers for panic/anxiety symptoms     ? [x] Implement physical activity routine (with physician approval)     ? [] Consider introduction of bibliotherapy and/or videos     ? [x] Continue compliance with medical treatment plan (or explore barriers)                                         Degree to which this is a problem (1-4): 4    Degree to which goal is met (1-4)1    Date of Review:2018    ? Depression    Goal:  Decrease average depression level from 4 to 3.   Strategies:    ?[x] Decrease social isolation     [x] Increase involvement in meaningful activities     ?[x] Discuss sleep hygiene     ?[x] Explore thoughts and expectations about self and others     ?[x] Process grief (loss of significant person, independence, role, etc.)     ?[x] Assess for suicide risk     ?[x] Implement physical activity routine (with physician  "approval)     [] Consider introduction of bibliotherapy and/or videos     [x] Continue compliance with medical treatment plan (or explore barriers)     Degree to which this is a problem (1-4): 4   Degree to which goal is met (1-4)1  Date of Review:6/27/2018    Substance use  Goal:  \"Stay sober and keep my mental health stable so I can be healthier and be productive in the community\"    Strategies: ? [x] Consider referral for chemical dependency evaluation     ? [x] Discuss barriers to participating in AA/NA or other peer-facilitated groups         [x] Address environmental factors which may interfere with sobriety     ? [x] Explore short-term versus long-term consequences of use     ? 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 Major depressive disorder, recurrent, moderate  2. Generalized anxiety disorder  3.Tobacco use disorder  4.Heroin use disorder, severe, on maintenance therapy   5.Alcohol use disorder, severe, in remission  6.Cannabis dependence in remission     WHODAS 2.0 12-item version: 26  (54%)    Scores presented in qualifiers to represent level of disability.    SEVERE Problem (high, extreme, ...) 50-95%  H1= 25  H2= 20   H3= 10      Clinical assessments completed: NOREEN-7: 10, PHQ-9:14,CAGE-AID:4/4, PANSI: 3.17 positive; 1.88 negative; denied any past or current SI/HI    Strengths/personal resources:\"Biking, roller; skating.  I am also a good and caring person\"     Limitations: :\"Being around people that use drugs; my mental illness, low self-esteem\"      Cultural Considerations: , single. Navigates the system with the assistance from [facility]. Might benefit from CM services.  Uses Western medicine and is " working on sobriety. Uses his Gnosticist Linda for his spiritual growth.     Persons responsible for this plan:  ? [x] Patient ? [x] Provider ? [] Other: __________________    Provider:Performed and documented by 3/27/2018    Date:  3/27/2018  Time:  10:05 AM        Patient Signature:____________________________________ Date: ______________     Guardian Signature: __________________________________ Date: ______________     Therapist Signature: __________________________________ Date: ______________

## 2021-06-17 NOTE — TELEPHONE ENCOUNTER
Telephone Encounter by Martha Mckenzie at 6/17/2020  8:23 AM     Author: Martha Mckenzie Service: -- Author Type: --    Filed: 6/17/2020  8:25 AM Encounter Date: 6/15/2020 Status: Signed    : Martha Mckenzie       PRIOR AUTHORIZATION DENIED    Denial Rational: Patient must use covered alternative- Procto-Brijesh, Proctosol-Hc Cream, hydrocortisone acetate rectal            Appeal Information: If provider would like to appeal please provide a letter of medical necessity stating why formulary alternatives would not be clinically appropriate for the patient and route back to the PA team.

## 2021-06-17 NOTE — PROGRESS NOTES
5/8/2018    Start time: 12:03   Stop Time: 13:00   Session # 3    Oscar Mojica is a 57 y.o. male is being seen today for    Chief Complaint   Patient presents with      Follow Up     Anxiety     Depression   .   Follow up in regards to ongoing symptom management of anxiety and depression.     New symptoms or complaints: forgetful, increased depression    Functional Impairment:   Personal: 3  Family: 3  Social: 3  Work: 4    Clinical assessment of mental status:   Oscar Mojica presented on time.   He was oriented x3, open and cooperative, and dressed appropriately for this session and weather. His memory was Mild .  His speech was  Within normal.  Language was hesitant.  Concentration and focus is Within normal. Psychosis is not noted or reported. He reports his mood is Depressed.  Affect is congruent with speech and is Depressed.  Fund of knowledge is adequate. Insight is adequate for therapy.    Suicidal/Homicidal Ideation present: Patient denies suicidal and homicidal ideations/means or plans.     Patient's impression of their current status: Patient presented to this clinic today to express feelings of depression, relationships, Health issues in general. He presented at a different time as he has missed his scheduled time. He reports he had overslept. He also reports having a heavy schedule with many appointments which sometimes overlaps. He is agreeable to continue working with his [employer] worker around remembering his schedule.  Patient reports he has ended his 5 months relationship due to lack of trust. He now wants to focus on his health and self care. He has gained weight that he does not wish to keep. He wants to start a healthy diet, exercising, drinking more water, and most importantly, increasing his positive self talk. He is returning in a week for more support.     Therapist impression of patients current state: This 57 y.o. Black or  male was late today. Writer arranged to see the  patient at a different time as today was the only chance he had before she could be discharged. He has exceeded the number of no show allowed before the discharge. Writer once again went over the patient's informed consent to clarify any questions the patient might have. Writer will continue to build working relationship which might take some time given the patient history of suspicions. Writer notes that the patient responding well modalities that include patient's centered, some open ended questions and simple reflections. Writer will continue to coordinate with the patient's [employer] worker to increase the likelyhood of keeping his attendance in therapy.     Assessment tools used today include: How do you feel today?    Type of psychotherapeutic technique provided: Insight oriented, Client centered and Solution-focused    Progress toward short term goals:Poor progress, Inconsistency in therapy, forgetful, depression.    Review of long term goals: Initial treatment plan . Date of last review 3/27/2018 Next review: 6/27/2018.    Diagnosis:   1. Moderate single current episode of major depressive disorder    2. Generalized anxiety disorder    3. Tobacco use disorder    4. Heroin use disorder, severe, on maintenance therapy    5. Alcohol use disorder, severe, in early remission    6. Cannabis dependence in remission    No change    Plan and Follow-up:   1.Patient plans to continue taking the medication as prescribed.   2. Patient plans to remain sober from any mood altering substances.  3. Patient plans to follow up with therapy in two weeks.     Discharge Criteria/Planning: Client has chronic symptoms and ongoing therapy for maintenance stability recommended.    Performed and documented by JOANNE Dias; [facility] 5/8/2018

## 2021-06-17 NOTE — PROGRESS NOTES
Office Visit - Follow Up   Oscar Mojica   57 y.o. male    Date of Visit: 4/26/2018    Chief Complaint   Patient presents with     Back Pain     saw Dr. Mcfadden with [facility] and was given 2 injections in his back. Increased back pain since injections.         Assessment and Plan   1. Chronic back pain   Still has persisting back pains. Was seen by orthopedics, Dr. Mcfadden  twice.  Underwent 2 cortisone injections to his lower back without relief of his back pains.  Was also given gabapentin for pain relief together with meloxicam.  But his pains still persisted.  Now getting physical therapy which he thinks it is giving him  better but partial relief of his back pains.  Wants to continue with his physical therapy which it is fine.  His  mentioned about using tramadol.  Okay to try.  Will give him tramadol to try.  - traMADol (ULTRAM) 50 mg tablet; Take 1 tablet (50 mg total) by mouth 3 (three) times a day as needed for pain.  Dispense: 60 tablet; Refill: 0    2. Essential hypertension  Controlled.  Continue hydrochlorothiazide.  Also takes potassium for replacement due to this diuretic    3. Major depression  Next major depression.  But in remission.  Last PHQ 9 score was 11 which is quite good.    4. Morbid obesity  Morbidly obese  based on his BMI of 14.6.  Encouraged to lose weight.    The following high BMI interventions were performed this visit: encouragement to exercise and weight monitoring      Follow up in 3 months.     History of Present Illness   This 57 y.o. old male is here for follow-up of his back pains.  Complains of chronic low back pains.  Was  referred to orthopedics.  Saw orthopedics twice.  Got 2 cortisone shots to his lower back.  But did not give him relief of his back pains.  Also takes gabapentin with meloxicam.  But still has persisting back pains.  Getting physical therapy now.  This seems to  to give him partial relief of his back pains.  Wants to try tramadol.  Has  hypertension controlled by hydrochlorothiazide.  Takes potassium supplement because of his diuretic.  Because major depression controlled by sertraline.  Morbidly obese.    Review of Systems   A 12 point comprehensive review of systems was negative except as noted..     Medications, Allergies and Problem List   Reviewed and updated             Chief Complaint   Back Pain (saw Dr. Mcfadden with [facility] and was given 2 injections in his back. Increased back pain since injections. )       Patient Profile   Social History     Social History Narrative    Single. No children. Under disability.         Past Medical History   Patient Active Problem List   Diagnosis     Major depression     Essential hypertension     Left knee pain       Past Surgical History  He has no past surgical history on file.       Medications and Allergies   Current Outpatient Prescriptions   Medication Sig     clotrimazole (LOTRIMIN) 1 % cream APPLY TOPICALLY TWICE DAILY     diclofenac sodium (VOLTAREN) 1 % Gel APPLY 2 GRAMS TOPICALLY QID.     gabapentin (NEURONTIN) 300 MG capsule TK 1 C PO TID     hydroCHLOROthiazide (HYDRODIURIL) 25 MG tablet Take 25 mg by mouth daily.     meloxicam (MOBIC) 15 MG tablet Take 15 mg by mouth daily.     omeprazole (PRILOSEC) 20 MG capsule TK 1 C PO D 1 HOUR AC     potassium chloride SA (K-DUR,KLOR-CON) 20 MEQ tablet TK 1 T PO Q 24 H     sertraline (ZOLOFT) 100 MG tablet Take 100 mg by mouth at bedtime.     traMADol (ULTRAM) 50 mg tablet Take 1 tablet (50 mg total) by mouth 3 (three) times a day as needed for pain.     Allergies   Allergen Reactions     Hay Fever And Allergy Relief      Penicillins Nausea Only        Family and Social History   No family history on file.     Social History   Substance Use Topics     Smoking status: Current Every Day Smoker     Packs/day: 0.50     Types: Cigarettes     Smokeless tobacco: Never Used     Alcohol use No        Physical Exam       Physical Exam  /60  Pulse 71   "Ht 5' 2\" (1.575 m)  Wt 222 lb (100.7 kg)  SpO2 94%  BMI 40.6 kg/m2  General appearance: alert, appears stated age, cooperative, no distress and morbidly obese  Head: Normocephalic, without obvious abnormality, atraumatic  Throat: lips, mucosa, and tongue normal; teeth and gums normal  Neck: no adenopathy, no carotid bruit, no JVD, supple, symmetrical, trachea midline and thyroid not enlarged, symmetric, no tenderness/mass/nodules  Lungs: clear to auscultation bilaterally  Heart: regular rate and rhythm, S1, S2 normal, no murmur, click, rub or gallop  Abdomen: soft, non-tender; bowel sounds normal; no masses,  no organomegaly  Extremities: extremities normal, atraumatic, no cyanosis or edema  Skin: Skin color, texture, turgor normal. No rashes or lesions  Musculoskeletal: Tender lower back with difficult range of motion     Additional Information        Chele Hodge MD  Internal Medicine  Contact me at [phone]     Additional Information   Current Outpatient Prescriptions   Medication Sig     clotrimazole (LOTRIMIN) 1 % cream APPLY TOPICALLY TWICE DAILY     diclofenac sodium (VOLTAREN) 1 % Gel APPLY 2 GRAMS TOPICALLY QID.     gabapentin (NEURONTIN) 300 MG capsule TK 1 C PO TID     hydroCHLOROthiazide (HYDRODIURIL) 25 MG tablet Take 25 mg by mouth daily.     meloxicam (MOBIC) 15 MG tablet Take 15 mg by mouth daily.     omeprazole (PRILOSEC) 20 MG capsule TK 1 C PO D 1 HOUR AC     potassium chloride SA (K-DUR,KLOR-CON) 20 MEQ tablet TK 1 T PO Q 24 H     sertraline (ZOLOFT) 100 MG tablet Take 100 mg by mouth at bedtime.     traMADol (ULTRAM) 50 mg tablet Take 1 tablet (50 mg total) by mouth 3 (three) times a day as needed for pain.     Allergies   Allergen Reactions     Hay Fever And Allergy Relief      Penicillins Nausea Only     Social History   Substance Use Topics     Smoking status: Current Every Day Smoker     Packs/day: 0.50     Types: Cigarettes     Smokeless tobacco: Never Used     Alcohol use No "         Time: total time spent with the patient was 25 minutes of which >50% was spent in counseling and coordination of care
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
## 2021-06-17 NOTE — PROGRESS NOTES
Patient here today to initiate psychiatric care. States he does not take his medication on the regular. States depression is a 5/5, states is fluctuates though out the day. Denies SI/Hi. States anxiety 2/5. States sleep is good.    [facility] Date: 05/10/18  Query Report Page#: 1  Patient Rx History Report  ROBERTO DOW  Search Criteria: Last Name 'roberto' and First Name 'lazara' and  =  and Request Period =  to  '05/10/18' - 3 out of 3 Recipients Selected.  Fill Date Product, Str, Form Qty Days Pt ID Prescriber Written RX# N/R* Pharm **MED+  ---------- -------------------------------- ------ ---- --------- ---------- ---------- ------------ ----- --------- ------  2018 TRAMADOL HCL 50 MG TABLET 60.00 20 [id number] 2018 [id number] N [id number] 15.0  2018 GABAPENTIN 300 MG CAPSULE 90.00 30 [id number] 2018 [id number] N [id number] 00.0  2018 GABAPENTIN 300 MG CAPSULE 60.00 22 [id number] 2018 [id number] N [id number] UNK  *N/R N=New R=Refill  +MED Daily  Prescribers for prescriptions listed  ----------------------------------------------------------------------------------------------------------------------------------  [id number] FIORELLA MARTIN MD; [facility], [address] [place] [address] [address], [facility] [address]  [id number] MAC MARK DO; [facility], [address] [place], [place] [address], [facility] [address]  Pharmacies that dispensed prescriptions listed  ----------------------------------------------------------------------------------------------------------------------------------  [id number] [place].; : [facility] # [id number], [address] [facility] [place] [address],  [id number] Kr[place].; : [facility] # [id number], [address] [facility] [address],  Patients that match search  criteria  ----------------------------------------------------------------------------------------------------------------------------------  [id number] ROBERTO FORD,  62; [address] [facility] [address]  [id number] ROBERTO DOW  62; [address], [place] [address]  [id number] ROBERTO DOW  62; [address], [place] [address]  MED Summary  This section displays cumulative MED values by unique recipient. The MED Max value is the maximum occurrence of cumulative MED  sustained for any 3 consecutive days. This value is calculated based on prescriptions dispensed during the date range requested.  -----------------------------------------------------------------------------------------------------------------------------------  15 Paloma Mejia; 1960; [address], [place] [address]  0 PALOMA MEJIA; 1960; [address], [place] [address]

## 2021-06-17 NOTE — PROGRESS NOTES
Writer talked to Caden, the patient's [facility] worker with [facility] support services regarding his inconsistence in therapy. Caden was not aware of patient's reasons to No Show today. He plans to review his treatment plan to include organization skills building. Writer will mail out the letter following the 2 NS.    Performed and documented by GLENNY Dias- RIGOBERTO; [facility] 5/1/2018

## 2021-06-18 NOTE — PROGRESS NOTES
"5/29/2018    Start time: 12:07  Stop Time: 13:00   Session # 6    Oscar Mojica is a 57 y.o. male is being seen today for    Chief Complaint   Patient presents with      Follow Up     Interpersonal Stress     Follow up in regards to ongoing symptom management of anxiety and depression.     New symptoms or complaints: \" need to lose weight, wants a relationship\"    Functional Impairment:   Personal: 3  Family: 3  Social: 3  Work: 0    Clinical assessment of mental status:   Oscar Mojica presented on time.   He was oriented x3, open and cooperative, and dressed appropriately for this session and weather. His memory was Normal cognitive functioning .  His speech was  Within normal.  Language was appropriate.  Concentration and focus is Within normal. Psychosis is not noted or reported. He reports his mood is Anxious and Depressed.  Affect is congruent with speech and is Congruent w/content of speech.  Fund of knowledge is adequate. Insight is adequate for therapy.    Suicidal/Homicidal Ideation present: Patient denies suicidal and homicidal ideations/means or plans.     Patient's impression of their current status: Patient presented to this clinic to continue expressing his concerns and feelings for support and coping strategies.  Patient shared frustrations around his weight gain and the fact he is no longer involved in intimate relationship. He is planning to see a Bariatric provider. He is seeing a chiropractor for his back and a PT. 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 He notes he gets overwhelmed with the heavy schedule and wished to move his psychotherapy session to biweekly. He would like to focus on his recovery as his recovery as be slow in seeking intimate relationship. He has been practicing his 3 Cs; brought in some questions around this skill and expressed satisfaction with today's intervention and support.     Therapist impression of patients current state: This 57 y.o. Black or "  male presented on time with a non apparent stress. Writer processed his feelings and concerns using open ended questions, validations, simple reflection and person centered approaches. Writer notes the patient is exhibiting interest in getting better in different layers of a healthy life styles ( exercise, diet, hygiene and sleep). He was encouraged to follow through with his providers' recommendations to achieve healthy life styles. Writer will continue to challenge the patient in this process. Writer supported the patient's request to review his frequency in therapy to biweekly to meet other obligations with other providers in the community.     Assessment tools used today include: How do you feel today?    Type of psychotherapeutic technique provided: Client centered, Solution-focused and CBT    Progress toward short term goals:no new issue today, depressed and anxious about future    Review of long term goals: Initial Treatment Plan: 3/26/2018 next review: 6/26/2018    Diagnosis:   1. Moderate single current episode of major depressive disorder    2. Generalized anxiety disorder    3. Tobacco use disorder    No change    Plan and Follow-up:   1.Patient plans to continue practicing the 3 Cs as needed  2.Patient plans to continue taking the medication as prescribed.   3. Patient plans to continue with his self help meetings weekly  4.Patient plans to follow up with therapy in 2 weeks.     Discharge Criteria/Planning: Patient will continue with follow-up until therapy can be discontinued without return of signs and symptoms.    Performed and documented by GLENNY Dias- RIGOBERTO; [facility] 5/29/2018

## 2021-06-18 NOTE — PROGRESS NOTES
"6/25/2018    Start time:15:02    Stop Time: 16:00   Session # 8    Oscar Mojica is a 57 y.o. male is being seen today for    Chief Complaint   Patient presents with      Follow Up     Anxiety     Depression     Addiction     Follow up in regards to ongoing symptom management of anxiety and depression.     New symptoms or complaints: \" Relationship, lonely\"    Functional Impairment:   Personal: 4  Family: 3  Social: 3  Work: 0    Clinical assessment of mental status:   Oscar Mojica presented on time.   He was oriented x3, open and cooperative, and dressed appropriately for this session and weather. His memory was Normal cognitive functioning .  His speech was  Within normal.  Language was appropriate.  Concentration and focus is Brief. Psychosis is not noted or reported. He reports his mood is Anxious and Depressed.  Affect is congruent with speech and is Depressed.  Fund of knowledge is adequate. Insight is adequate for therapy.    Suicidal/Homicidal Ideation present: Patient denies suicidal and homicidal ideations/means or plans.     Patient's impression of their current status: Patient reports has been doing well. Continues to attend his appointments. He has been trying to eat sensibly. He is planning to see a bariatric provider. He notes he has been gaining too much weight which he sees is slowing him from having social life. He reports he is doing well with sobriety. He believes sobriety has contributed to gaining weight. He notes he would have been sober for 22 years but has had many relapses. Now he is counting 5 months.  Patient expressed sadness due to having to lose his [employer] worker to a different job. Patient reports he has been practicing coping skills including the 3 Cs.  Patient reports no other issues.     Therapist impression of patients current state: This 57 y.o. Black or  male presented on time with some stress as appeared. Writer processed with the patient his concerns and " "feelings. Writer validated his feelings as appropriate. Reinforced his success with sobriety and effort to seek help with his weight concern. He is doing well with coping skills including 3 Cs and increased positive self talk.    Assessment tools used today include:How do you feel today?    Type of psychotherapeutic technique provided: Client centered, Solution-focused and CBT    Progress toward short term goals:\"feeling overwhelmed by many things in mind, too many appts.\"    Review of long term goals: Treatment Plan updated: 3/27/2018 next review: 6/27/2018    Diagnosis:   1. Moderate single current episode of major depressive disorder (H)    2. Generalized anxiety disorder    3. Tobacco use disorder    No change    Plan and Follow-up:     Patient want to continue working on these goals:  1.  Patient plans to continue taking his medications as prescribed.  2.  Patient plans to remain sober from any mood altering substances.  3.  Patient plans to cut down his sugar intake from coffee and cookies.  4.  Patient plans to exercise every day for at least 1 hour.  5.  Patient will return in 2 weeks    Discharge Criteria/Planning: Patient will continue with follow-up until therapy can be discontinued without return of signs and symptoms.    Performed and documented by GLENNY Dias- RIGOBERTO; [facility] 6/25/2018  "

## 2021-06-18 NOTE — PROGRESS NOTES
5/22/2018    Start time:14:03   Stop Time: 15:00  Session # 5    Oscar Mojica is a 57 y.o. male is being seen today for    Chief Complaint   Patient presents with      Follow Up     Relationship issues     Follow up in regards to ongoing symptom management of anxiety and depression.     New symptoms or complaints: isolation, feeling depressed    Functional Impairment:   Personal: 3  Family: 3  Social: 4  Work: 0    Clinical assessment of mental status:   Oscar Mojica presented on time.   He was oriented x3, open and cooperative, and dressed appropriately for this session and weather. His memory was Normal cognitive functioning .  His speech was  Within normal.  Language was appropriate. concentration and focus is Within normal. Psychosis is not noted or reported. He reports his mood is Depressed.  Affect is congruent with speech and is Anxious and Depressed.  Fund of knowledge is adequate. Insight is adequate for therapy.    Suicidal/Homicidal Ideation present: Patient denies suicidal and homicidal ideations/means or plans.     Patient's impression of their current status: Patient presented to this clinic reporting that he has been isolated and feels depressed due to lack of the stricture.  He indicated that he has no known daily routines other appointments with his providers.  Today he expressed interest in joining a self-help group to keep up with his recovery. At the positive note, patient has been following up with his scheduled appointments here in the clinic and in the community. He is now seeing a chiropractor down [facility] which is easier for him to access to. Has been biking and wants to lose weight.He is planning to start going to The , likes swimming and hopes to meet sober friends there.   Wishes to have a girl friend but fears to have troubles should he rush into looking someone without knowing their background in terms of sobriety. No other issues discussed at this session.     Therapist  impression of patients current state: This 57 y.o. Black or  male presented on time with positive attitude. His affect was jovial. Was seen interacting with other patients in the lobby. He was forthcoming sharing he did not do his home assignment due to too many distractions. Writer processed  feelings around the presenting issues today. It is clear that the patient is making effort in his revery process. He is able to express his concerns which allows providers to introduce new coping skills for his issues. Today, patient learned about the 3 Cs and cognitive distortions. He will be practicing this skill as needed. He seems to be doing well with validations where appropriate. He is being challenged with open ended questions. He is well receptive to comments and positive reinforcement.     Assessment tools used today include: How do you feel today?    Type of psychotherapeutic technique provided: Client centered, Solution-focused and CBT    Progress toward short term goals:patient reports isolation and lack of structure    Review of long term goals: initial treatment plan: 3/27/2018 Next review: 6/27/2018     Diagnosis:   1. Moderate single current episode of major depressive disorder    2. Generalized anxiety disorder    3. Tobacco use disorder    4. Heroin use disorder, severe, on maintenance therapy    5. Alcohol use disorder, severe, in early remission    6. Cannabis dependence in remission    No change    Plan and Follow-up:  Patient agreed to keep these weekly goals:  1. Patient will continue taking his medications as prescribed  2. Patient will increase outdoor activities with sober people  3. Patient will use [place] coloring to distract himself from stress  4. Patient will practice the 3 Cs to challenge negative thinking as needed  5. Patient will see this therapist in a week    Discharge Criteria/Planning: Client has chronic symptoms and ongoing therapy for maintenance stability  recommended.    Performed and documented by GLENNY Dias- RIGOBERTO; [facility] 5/22/2018

## 2021-06-18 NOTE — PROGRESS NOTES
"6/11/2018    Start time: 13:00   Stop Time: 13:55   Session # 7    Oscar Mojica is a 57 y.o. male is being seen today for    Chief Complaint   Patient presents with      Follow Up     Anxiety     Depression     Follow up in regards to ongoing symptom management of anxiety and depression.     New symptoms or complaints: \" I am gaining too much weight, not okay, it makes me sad, upset about myself\"\"    Functional Impairment:   Personal: 4  Family: 3  Social: 3  Work: 0    Clinical assessment of mental status:   Oscar Mojica presented on time.   He was oriented x3, open and cooperative, and dressed appropriately for this session and weather. His memory was Normal cognitive functioning .  His speech was  Delayed/Hesitant and Soft.  Language was appropriate.  Concentration and focus is Brief. Psychosis is not noted or reported. He reports his mood is Depressed.  Affect is congruent with speech and is Depressed and sleepy.  Fund of knowledge is adequate. Insight is adequate for therapy.    Suicidal/Homicidal Ideation present: Patient denies suicidal and homicidal ideations/means or plans.     Patient's impression of their current status: Patient presented today with a big concern regarding his weight gain.  He brought in a box of Pecan chocolate cookies that he has been eating throughout the day.  He also has been drinking a lot of sugar through his coffee.  He does not like the way he looks.  He wants to move to a better and healthy lifestyle.  He is planning to see a bariatric provider and wants to address this issue of weight gain we will plan to be put on a diet.  Patient indicates that he will cut down his sugar intake of from cookies and coffee.  He plans to introduce vegetables and fruit in his daily diet. He also plan to reduce his tobacco use, \"because it is too expensive\"  Patient will return for more session in 2 weeks.    Therapist impression of patients current state: This 57 y.o. Black or  " male presented to this clinic on time.  He appeared sleepy and weak. Writer processed his feelings around weight gain and depression.  Writer reinforced patient motivation for change.  Encouraged the patient to stick to his a plan to address his weight gain with bariatric provider.  Provided resources that will help him with living with a healthy lifestyle was such is exercising, healthy diet, personal hygiene, and better sleep.  Writer encouraged the patient to continue addressing his issues so he can be able to process them in the session.    Assessment tools used today include: How do you feel today?    Type of psychotherapeutic technique provided: Insight oriented, Client centered, Solution-focused and CBT    Progress toward short term goals:gaining more weight, not comfortable, feeling depressed    Review of long term goals: Initial Treatment Plan:   3/26/2018 next review: 6/26/2018    Diagnosis:   1. Moderate single current episode of major depressive disorder    2. Generalized anxiety disorder    3. Tobacco use disorder    4. Heroin use disorder, severe, on maintenance therapy    No change    Plan and Follow-up:    1.  Patient plans to continue taking his medications as prescribed.  2.  Patient plans to remain sober from any mood altering substances.  3.  Patient plans to cut down his sugar intake from coffee and cookies.  4.  Patient plans to exercise every day for at least 1 hour.  5.  Patient will return in 2 weeks    Discharge Criteria/Planning: Patient will continue with follow-up until therapy can be discontinued without return of signs and symptoms.    Performed and documented by JOANNE Dias; [facility] 6/11/2018

## 2021-06-18 NOTE — LETTER
Letter by Margareth Torres RD at      Author: Margareth Torres RD Service: -- Author Type: --    Filed:  Encounter Date: 2/18/2019 Status: (Other)       Oscar Mojica  [address]  [place] [address]               February 18, 2019      Dear Oscar:    We are sorry that you missed your appointment with Margareth carter Dietitian on 2/18/2019. Your health and follow-up medical care are important to us. Please call our office as soon as possible so that we may reschedule your appointment. If you have already rescheduled your appointment, please disregard this letter.    Sincerely,        Margareth Torres RD

## 2021-06-18 NOTE — PROGRESS NOTES
"5/15/2018    Start time: 14:04    Stop Time: 15:00   Session # 4    Oscar Mojica is a 57 y.o. male is being seen today for    Chief Complaint   Patient presents with      Follow Up     Anxiety     Depression     Addiction   .   Follow up in regards to ongoing symptom management of anxiety and depression.     New symptoms or complaints: \" frustration, stressed\"    Functional Impairment:   Personal: 4  Family: 2  Social: 3  Work: 0    Clinical assessment of mental status:   Oscar Mojica presented on time.   He was oriented x3, open and cooperative, and dressed appropriately for this session and weather. His memory was Normal cognitive functioning .  His speech was  Within normal.  Language was appropriate.  Concentration and focus is Within normal. Psychosis is not noted or reported. He reports his mood is stress, frustrated, and depressed..  Affect is congruent with speech and is Depressed.  Fund of knowledge is adequate. Insight is adequate for therapy.    Suicidal/Homicidal Ideation present: Patient denies suicidal and homicidal ideations/means or plans.     Patient's impression of their current status: Patient reports he has been feeling frustrated, easily irritated, stressed, and depressedn lately. Shared she was informed by other people in recovery that it is a normal feelings when you are on methadone. He also stated he just [facility] [place] [facility] as [facility] feels [facility]. He is also enrolled at [facility] in [place] for some art and peer support. He is combining this with other schedules to his providers. He is aware that each one of these activities needs to take place which requires a well structured day. He is working with his [facility] worker for organization and other skills as needed. Patient wants to challenge himself and was agreeable to try some distraction activities that include taking walks, listening to the music and doing coloring. He is retrning " in a week for more support.     Therapist impression of patients current state: This 57 y.o. Black or  male presented on time. Writer used active listening, reinforced his effort to reach out for help and to attend his appt today. Writer validated his feelings and invited him to problem solving. Patient might address these feelings to his methadone provider as he believes they are connected to his methadone treatment. Writer oriented the patient to the new skills of thought challenging using the 3 Cs. Also provided the prints out coloring pages as this allows him to distract from his negative thinking.     Assessment tools used today include:How do you feel today?    Type of psychotherapeutic technique provided: Client centered, Solution-focused and CBT    Progress toward short term goals:reports he has been feeling more depressed in the last week.     Review of long term goals: initial treatment plan: 3/27/2018 next review: 6/27/2018 .     Diagnosis:   1. Moderate single current episode of major depressive disorder    2. Generalized anxiety disorder    3. Tobacco use disorder    4. Heroin use disorder, severe, on maintenance therapy    5. Alcohol use disorder, severe, in early remission    6. Cannabis dependence in remission     No change    Plan and Follow-up:  1. Patient will continue taking his medications as prescribed  2. Patient will increase outdoor activities with sober people  3. Patient will use [place] coloring to distract himself from stress  4. Patient will practice the 3 Cs to challenge negative thinking as needed  5. Patient will see this therapist in a week    Discharge Criteria/Planning: Client has chronic symptoms and ongoing therapy for maintenance stability recommended.    Performed and document ed by GLENNY Dias- RIGOBERTO; [facility] 5/15/2018

## 2021-06-19 NOTE — PROGRESS NOTES
Outpatient Mental Health Treatment Plan    Name:  Oscar Mojica  :  1960  MRN:  [id number]    Treatment Plan:  Updated Treatment Plan  Intake/initial treatment plan date:  3/27/2018  Benefit and risks and alternatives have been discussed: Yes  Is this treatment appropriate with minimal intrusion/restrictions: Yes  Estimated duration of treatment:  10 +  Anticipated frequency of services:  Every  week  Necessity for frequency: This frequency is needed to establish therapeutic goals and for continuity of care in order to monitor progress.  Necessity for treatment: To address cognitive, behavioral, and/or emotional barriers in order to work toward goals and to improve quality of life.      Plan:         ?   ? Anxiety    Goal:  Decrease average anxiety level from 4 to 3.   Strategies: ? [x]Learn and practice relaxation techniques and other coping strategies (e.g., thought stopping, reframing, meditation)     ? [x] Increase involvement in meaningful activities     ? [x] Discuss sleep hygiene     ? [x] Explore thoughts and expectations about self and others     ? [x] Identify and monitor triggers for panic/anxiety symptoms     ? [x] Implement physical activity routine (with physician approval)     ? [] Consider introduction of bibliotherapy and/or videos     ? [x] Continue compliance with medical treatment plan (or explore barriers)                                         Degree to which this is a problem (1-4): 4    Degree to which goal is met (1-4)1    Date of Review:10/15/2018    ? Depression    Goal:  Decrease average depression level from 4 to 3.   Strategies:    ?[x] Decrease social isolation     [x] Increase involvement in meaningful activities     ?[x] Discuss sleep hygiene     ?[x] Explore thoughts and expectations about self and others     ?[x] Process grief (loss of significant person, independence, role, etc.)     ?[x] Assess for suicide risk     ?[x] Implement physical activity routine (with physician  "approval)     [] Consider introduction of bibliotherapy and/or videos     [x] Continue compliance with medical treatment plan (or explore barriers)     Degree to which this is a problem (1-4): 4   Degree to which goal is met (1-4)1  Date of Review:10/15/2018    Substance use  Goal:  \"Stay sober and keep my mental health stable so I can be healthier and be productive in the community\"    Strategies: ? [x] Consider referral for chemical dependency evaluation     ? [x] Discuss barriers to participating in AA/NA or other peer-facilitated groups         [x] Address environmental factors which may interfere with sobriety     ? [x] Explore short-term versus long-term consequences of use     ? [x] Continue compliance with medical treatment plan (or explore  barriers)     Degree to which this is a problem (1-4): 4   Degree to which goal is met (1-4)1  Date of Review:10/15/2018    Functional Impairment: 1=Not at all/Rarely  2=Some days  3=Most Days  4=Every Day   Personal: 4  Family: 4  Social: 4  Work: SS    Diagnoses  1. Major depressive disorder, recurrent, moderate  2. Generalized anxiety disorder  3.Tobacco use disorder  4.Heroin use disorder, severe, on maintenance therapy   5.Alcohol use disorder, severe, in remission  6.Cannabis dependence in remission     WHODAS 2.0 12-item version: 15  H1= 10  H2= 5  H3= 5  In the last 30 days, patient's level of disability was at 31 %     Clinical assessments completed: NOREEN-7:9, PHQ-9:10,CAGE-AID:4/4,  denied any past or current SI/HI    Strengths/personal resources:\"Biking, roller; skating.  I am also a good and caring person\"     Limitations: :\"Being around people that use drugs; my mental illness, low self-esteem\"      Cultural Considerations: , single. Navigates the system with the assistance from [facility]. Might benefit from CM services.  Uses Western medicine and continues to work on his sobriety. Uses his [employer] for his spiritual growth.     Persons " responsible for this plan:  ? [x] Patient ? [x] Provider ? [] Other: __________________    Provider:Performed and documented by Performed and documented by JOANNE Dias; [facility] 7/17/2018    Date:  7/17/2018  Time:  10:05 AM      Patient Signature:____________________________________ Date: ______________     Guardian Signature: __________________________________ Date: ______________     Therapist Signature: __________________________________ Date: ______________

## 2021-06-19 NOTE — PROGRESS NOTES
"  Mental Health Visit Note    8/20/2018    Start time: 15:03   Stop Time: 16:00   Session #11    Session Type: Patient is presenting for an Individual session.    Oscar Mojica is a 57 y.o. male is being seen today for    Chief Complaint   Patient presents with      Follow Up     Anxiety     Depression     Psycho social issues     Addiction     Sobriety   .     Follow up in regards to ongoing symptom management of anxiety and depression.     New symptoms or complaints: \" I feel lonely, no much going. I then have too much time to think negatively\"    Functional Impairment:   Personal: 4  Family: 3  Social: 3  Work: 0    Clinical assessment of mental status:   Oscar Mojica presented on time.   He was oriented x3, open and cooperative, and dressed appropriately for this session and weather. His memory was Mild .  His speech was  Within normal.  Language was appropriate. Concentration and focus is Within normal. Psychosis is not noted or reported. He reports his mood is Anxious and Depressed.  Affect is congruent with speech and is Anxious and Depressed.  Fund of knowledge is adequate. Insight is adequate for therapy.    Suicidal/Homicidal Ideation present: Patient denies suicidal and homicidal ideations/means or plans.     Patient's impression of their current status: Patient came in on time and wanted this writer note this as he had been late to his appointments. He reports he is trying to review his schedule and focus on priorities. He notes he is still experiencing problems with weight gain. He continue to struggle with some unhealthy snacks. Though he is mindful about this issue and is in a process of improving himself. He continues to experience sleep issues and is scheduled for a sleep study. Patient otherwise reports doing well in his relationship. Girl friend has been helping him from relapse.  He also notes he is doing well with chiropractic services. Patient wants to continue processing his feelings and " "thoughts around his health. He return in 2 weeks and has chosen to be seen at  for future sessions.     Therapist impression of patients current state: This 57 y.o. Black or  male presented on time with some positive affect. Patient was seen struggling to eat healthy but does not seem to have education around nutrition. He was some how insightful about his issues with weight gain but it is clearly noted he is not educated around healthy diet. He was seen eating junk food in the session. He himself has indicated that he eats cookies, ice cream, and chips out of proportion. Patient seemed receptive to today's intervention around behavioral change. Writer noted that patient is doing well with challenging his thoughts using the 3 Cs. He does well with open ended questions and simple reflexion.       Assessment tools used today include:How do you feel today?    Type of psychotherapeutic technique provided: Client centered, Solution-focused and CBT    Progress toward short term goals:' some depression and anxiety\"    Review of long term goals: Treatment Plan updated: 7/17/2018 next review: 10/15/2018    Diagnosis:   1. Moderate single current episode of major depressive disorder (H)    2. Generalized anxiety disorder    3. Tobacco use disorder    4. Heroin use disorder, severe, on maintenance therapy (H)    No change    Plan and Follow-up:   Goals reviewed and updated with the patient:  1. Patient will continue taking his medications as prescribed  2. Patient will continue working with his [facility] weekly to meet his needs  3.Patient will refrain from eating junk food  4.Patient will enroll at [facility] this week  5. Patient will return in 2 weeks for more support at [facility]    Discharge Criteria/Planning: Patient will continue with follow-up until therapy can be discontinued without return of signs and symptoms.    Performed and documented by GLENNY Dias- LICSW; [facility] " 8/20/2018

## 2021-06-19 NOTE — PROGRESS NOTES
"Mental Health Visit Note    8/6/2018    Start time: 3:25   Stop Time: 4:05   Session # 10    Session Type: Patient is presenting for an Individual session.    Oscar Mojica is a 57 y.o. male is being seen today for    Chief Complaint   Patient presents with      Follow Up     Depression     interpersonal stress, less productive     Follow up in regards to ongoing symptom management of anxiety and depression.     New symptoms or complaints: \" my weight alarms me. I am slow with things\"    Functional Impairment:   Personal: 4  Family: 4  Social: 3  Work: 0    Clinical assessment of mental status:   Oscar Mojica presented on time.   He was oriented x3, open and cooperative, and dressed appropriately for this session and weather. His memory was Mild .  His speech was  Within normal.  Language was appropriate.  Concentration and focus is Within normal. Psychosis is not noted or reported. He reports his mood is Depressed.  Affect is congruent with speech and is Anxious and Depressed.  Fund of knowledge is adequate. Insight is adequate for therapy.    Suicidal/Homicidal Ideation present: Patient denies suicidal and homicidal ideations/means or plans.     Patient's impression of their current status: Patient reports being less productive in the last several weeks. Continues to struggle with weight gain. Missed his appointment with his dietician. Reports feelings of guilt and self determination to make effort to catch with things he has been putting off. Reports he has been back with his girlfriend and notes he is better. Remains sober and has been able to control himself when he sees people drinking at some social events. Continues to work wit his [employer] worker who is helping with reminders for his appts. Patient wants to be seen at  site as he feels this will help with him getting to his appt on time. No other issues noted or reported today.    Therapist impression of patients current state: This 57 y.o. Black or  " "American male presented late. Though had called to indicate that he was stuck in the traffic in the bus. Writer supported the patient's wishes to move to a different site to help with arriving to his appt on time. Writer processed the patient's feelings around his weight loss. Encouraged him to follow up on his appt with his dietician which he indicated was his priority. Patient had expressed that he has bee experiencing some mood swings lately. So writer encouraged him to report this new symptom to his psychiatrist using the nurse line. He did well with some CBT skills around his care and did well with responding to today's intervention as noted. Writer provided a medication box as the patient has requested one.     Assessment tools used today include: How do you feel today?    Type of psychotherapeutic technique provided: Client centered, Solution-focused and CBT    Progress toward short term goals:\" depression gets harder these days\"    Review of long term goals: Treatment Plan updated: 7/17/2018 next review: 10/15/2018     Diagnosis:   1. Moderate single current episode of major depressive disorder (H)    2. Generalized anxiety disorder    3. Tobacco use disorder    4. Heroin use disorder, severe, on maintenance therapy (H)    No change    Plan and Follow-up:   1. Patient will continue taking his medications as prescribed  2. Patient will continue working with his [facility] weekly to meet his need  3.Patient will return in 2 weeks for more sypport    Discharge Criteria/Planning: Patient will continue with follow-up until therapy can be discontinued without return of signs and symptoms.    Performed and documented by GLENNY Dias- RIGOBERTO; [facility] 8/6/2018  "

## 2021-06-19 NOTE — LETTER
Letter by Jefe Koch LGSW at      Author: Jefe Koch LGSW Service: -- Author Type: --    Filed:  Encounter Date: 9/19/2019 Status: (Other)         Oscar Mojica  [address]  [place] [address]      September 19, 2019      Jaci Andrade let me know you are interested in more GED resources. I searched in your area and enclosed are the 10 programs that would be closest to you. Please let us know if you need any assistance with this.      Take care,        Electronically signed by ANTHONY Almaguer

## 2021-06-19 NOTE — PROGRESS NOTES
[facility]:  Non-Surgical Weight Loss Intake Appointment   Date of visit: 6/28/2018  Physician: Dennis Saini MD  Primary Care is Chele Hodge MD.  Oscar Mojica   57 y.o.  male  Oscar is a 57 y.o. year old male who is here today for consultation regarding non-surgical weight loss.   he was referred to the [facility] by his psychiatrist and PCP clinic.    Weight History:   Wt Readings from Last 3 Encounters:   06/28/18 214 lb (97.1 kg)   05/10/18 213 lb (96.6 kg)   04/26/18 222 lb (100.7 kg)     Body mass index is 39.14 kg/(m^2).       Assessment and Plan   Assessment: Oscar Mojica is a 57 y.o.,male presenting for assistance with medical weight loss.  Identified issues contributing to his excess weight include:     Short stature makes it easier to gain weight with excessive portion sizes. He's had issues with depression and substance abuse through the years and has noticed some weight gain particularly with the Zoloft/sertraline he's currently using.  He has an excessive sweet tooth and requires heavily sugared coffee/cereal/cakes/cookies throughout the day which is contributing to his central adiposity/metaboic syndrome appearance.      He's on the methadone program and today appears fairly somnolent, actually drifting off twice in the first 20 minutes of our visit but then rallied later on.  This in combination with his tramadol and possibly some undiagnosed sleep apnea (very high risk on screening questions and small airway on examination) can make weight gain easier as he has become fairly sedentary apart from working with [employer] on some knee PT recently.    Knowledge of healthy eating/macronutrients is limited currently and will benefit from several visits from our dietician staff to curb appetite/sweet cravings.    He's not a good candidate for weight loss medication at this point in time due to his previous chemical dependency issues and depression and  ongoing methadone use, essentially eliminating all available options apart from Orlistat.        Plan:  1.  Behavior Goals: 3 daily meals plan, ideally with a large breakfast, medium lunch        and smaller dinner. Avoidance of sugared-sweetened beverages and processed        carbohydrate snacks.  Work towards pre-planning meals to avoid falling back into old             habits/obesogenic habits.  Daily Food Tracking and morning weight recommended.  Weekly       check-in through MyChart to increase compliance.    2.  Diet Goals: Recommend a protein focused diet and daily Calorie restriction.  Increased protein intake to insure at        least 20-30 grams of protein per meal.      3.  Exercise/Activity Goals: start walking program and has access to gym at his Chiropractor that he can afford.  Long term goal of increasing endurance to allow for at least            200 minutes weekly of moderate exercise to maintain weight loss.      4.  Recommendation regarding weight loss medication:  None. He will discuss possibly switch off Zoloft with his psychiatrist in the future to something less obesongenic such as prozac or bupropion if they feel comfortable with such a change.    5.  Referral to Dietician for further education and customization of diet plan.    6.  Stress Reduction: doing well    7.  Intake Labs:  Ordered.    8.   Dry Eyes: requested eye lubrication drops, Rx sent.    9. Peripheral edema: check labs, increased protein/decreased sugar should help. Check thiamine levels.    10. HTN: on HCTZ and potassium. Labs pending.        1. Dry eyes    - carboxymethylcellulose sodium 0.5 % Drop; 2 drops 3 times daily for dry eyes.  Dispense: 30 mL; Refill: 3    2. Obesity, Class II, BMI 35-39.9, with comorbidity    - Split Night Sleep Study; Future  - Amb Referral to Bariatric Dietician  - Comprehensive Metabolic Panel; Future  - HM2(CBC w/o Differential); Future  - Vitamin D, Total (25-Hydroxy); Future  - Thyroid  "Stimulating Hormone (TSH); Future  - Vitamin B12; Future  - Magnesium; Future  - Glycosylated Hemoglobin A1c; Future    3. Arthritis      4. Hx of drug abuse  On methadone program    5. Depression  On zoloft. Followed by Psych    6. Excessive daytime sleepiness  Will order sleep study given STOPBANG 7, Mallampati 4 and ESS of 11.  - Split Night Sleep Study; Future    7. HTN (hypertension)   weight loss should help.    8. Peripheral edema  Diet change, BP management and weight loss should help.  - Vitamin B1 (Thiamine), Whole Blood (VIT B1 WB); Future       Return in about 6 weeks (around 8/9/2018) for Recheck.     Weight and Lifestyle History    Sleep history:  Hours per night: 12am to 7 am  Snoring/apnea/insomnia? snores  Restful/refreshed? no  Shift work? On disability  CPAP/BiPAP? no  Sleep study previously? no  TV in bedroom? na  Sleep aids/pills? no      STOP-BANG score for sleep apnea : 7  For general population   JANE - Low Risk : Yes to 0 - 2 questions  JANE - Intermediate Risk : Yes to 3 - 4 questions  JANE - High Risk : Yes to 5 - 8 questions  or Yes to 2 or more of 4 STOP questions + male gender  or Yes to 2 or more of 4 STOP questions + BMI > 35kg/m2  or Yes to 2 or more of 4 STOP questions + neck circumference 17 inches / 43cm in male or 16 inches / 41cm in female    Weight History:  In what way is your excess weight affecting your wellness/health? Slow to get around now  Heaviest weight: current, \"never more than 200 lbs before\".  Any Previous weight loss, what was the most lost and method: none  Birth weIight, High School graduation weight: na  Lowest adult weight: na  Maternal health/smoking/weight: na  When did you start gaining weight and what were the circumstances? Since the 1990s gaining.  Is anyone else in your immediate family overweight? na  Finally,  Is there a weight you desire to be, what is your goal weight that would define successful weight loss for you? Under 190 lbs.   I would like to " lose weight so I can  :  Feel better   .     Barriers to weight loss:  Is anyone else at home/work/family a barrier to your weight loss? no  Work schedule/location? disabled  Current stressors include: none  Mobility problems: uses cane.    Counseled on health benefits of weight loss, goals for metabolic/diabetic risk reduction, HTN reduction, improved sleep and mobility, cancer reduction, longevity.    Fitness History:  Were you ever an athlete?na     Which sports? na  When was the last time you walked/hiked a mile?na  Do you have any limitations for activity?na  Do you have access to a gym, pool, club, fitness center, or ?has some equipment at his chiro office that he can use PRN.                Do you have any fitness goals/dreams that could motivate your weight loss?na    On a scale from 0-10,  how willing are you to start some sort of movement/exercise regimen? na    Counseled on physiologic benefits of exercise and for long term weight maintenance, goal working towards 200-300 minutes weekly.     Food History:  Who buys groceries?  He does  Do you eat at dinner table, is the TV on or off, family present? Grazing a lot  Any soda, how much? Sugared coffee a lot thorugh the day (currently sipping during visit so he can stay awake).  Meals per day? na  Snacks per day? na  Breakfast typically is: na  Lunch typically is: na  Dinner  is: na  Weekly Fast Food/dining out: frequent  Snacks consist of: na    Food sensitivities/allergies/intolerances/avoidances: na  Water per day? na    Any binging behavior?na  Any induced vomiting/purging? na  Any history of anorexia/bulimia? na  Any night eating issues? na  Stress induced eating? na    Goal Setting:  Short Term Goals 21 lbs would be 10% reduction: 193 lbs  Long Term Goals 180 lbs would be excellent result, improved alertness.         Patient Profile   Social History     Social History Narrative    Single. No children. Under disability.      History reviewed. No  "pertinent family history.       Past Medical History   Patient Active Problem List   Diagnosis     Major depression     Essential hypertension     Left knee pain     Hay fever and allergy relief and Penicillins  Current Outpatient Prescriptions   Medication Sig Note     clotrimazole (LOTRIMIN) 1 % cream APPLY TOPICALLY TWICE DAILY      diclofenac sodium (VOLTAREN) 1 % Gel APPLY 2 GRAMS TOPICALLY QID. 1/18/2018: Received from: External Pharmacy     hydroCHLOROthiazide (HYDRODIURIL) 25 MG tablet Take 25 mg by mouth daily.      meloxicam (MOBIC) 15 MG tablet Take 15 mg by mouth daily. 4/26/2018: Received from: External Pharmacy Received Sig: TK 1 T PO QD     methadone (DOLOPHINE) 10 mg/5 mL solution Take 45 mg by mouth every 12 (twelve) hours. 6/28/2018: Dispensed at methadone clinic.     omeprazole (PRILOSEC) 20 MG capsule TK 1 C PO D 1 HOUR AC 1/18/2018: Received from: External Pharmacy     potassium chloride SA (K-DUR,KLOR-CON) 20 MEQ tablet TK 1 T PO Q 24 H 12/7/2017: Received from: External Pharmacy     sertraline (ZOLOFT) 100 MG tablet Take 1 tablet (100 mg total) by mouth at bedtime.      traMADol (ULTRAM) 50 mg tablet Take 1 tablet (50 mg total) by mouth 3 (three) times a day as needed for pain.      carboxymethylcellulose sodium 0.5 % Drop 2 drops 3 times daily for dry eyes.        Past Surgical History  He has no past surgical history on file.     Examination   /59  Pulse 75  Resp 18  Ht 5' 2\" (1.575 m)  Wt 214 lb (97.1 kg)  SpO2 93%  BMI 39.14 kg/m2  Height: 5' 2\" (1.575 m) (6/28/2018  1:34 PM)  Initial Weight: 214 lbs (6/28/2018  1:34 PM)  Weight: 214 lb (97.1 kg) (6/28/2018  1:34 PM)  Weight loss from initial: 0 (6/28/2018  1:34 PM)  % Weight loss: 0 % (6/28/2018  1:34 PM)  BMI (Calculated): 39.1 (6/28/2018  1:34 PM)  SpO2: 93 % (6/28/2018  1:34 PM)  Waist Circumference (In): 46 Inches (6/28/2018  1:34 PM)  Hip Circumference (In): 46 Inches (6/28/2018  1:34 PM)  Neck Circumference (In): 19 " Inches (2018  1:34 PM)  General:  Alert and ambulatory, drifting off to sleep during interview  HEENT:  No conjunctival pallor, moist mucous Membranes, neck is thick. Injected eyes. Mallampati 4  Pulmonary:  Normal respiratory effort, no cough, no audible wheezes/crackles.  CV:  Regular rate and Rhythm, no murmurs, pulses 2 plus  Abdominal: central adiposity: ticklish to palpation.  Extremities: trace pretibial edema  Skin:  No pallor.   Pscyh/Mood: medicated affect                                    LABS: ordered    Last recorded labs include:  No results found for: WBC, HGB, HCT, MCV, PLT   No results found for: [id number] No results found for: HGBA1C   No results found for: CHOL No results found for: PTH      No results found for: FERRITIN   No results found for: HDL   No results found for: [id number] No results found for: 56962   No results found for: LDLCALC No results found for: TSH No results found for: FOLATE   No results found for: TRIG Lab Results   Component Value Date    ALT 2018    AST 24 2018    ALKPHOS 67 2018    BILITOT 0.5 2018    No results found for: TESTOSTERONE     No components found for: CHOLHDL No results found for: 7597   @resusfast(vitamin a: 1)@       Other Notables/imaging:     Counselin minutes spent in direct consultation with the patient regarding conditions contributing to excess weight accumulation, with over 50% of the time spent in counseling, goal setting and initiating a plan to lose their excess weight.    Dennis Saini MD  [facility].  2018

## 2021-06-19 NOTE — LETTER
Letter by Chele Hodge MD at      Author: Chele Hodge MD Service: -- Author Type: --    Filed:  Encounter Date: 9/30/2019 Status: Signed         Oscar Mojica  [address]  [place] [address]             September 30, 2019         Dear Mr. Mojica,    Below are the results from your recent visit:    Resulted Orders   HM2(CBC w/o Differential)   Result Value Ref Range    WBC 6.4 4.0 - 11.0 thou/uL    RBC 5.45 4.40 - 6.20 mill/uL    Hemoglobin 14.1 14.0 - 18.0 g/dL    Hematocrit 42.8 40.0 - 54.0 %    MCV 78 (L) 80 - 100 fL    MCH 25.9 (L) 27.0 - 34.0 pg    MCHC 33.0 32.0 - 36.0 g/dL    RDW 12.5 11.0 - 14.5 %    Platelets 317 140 - 440 thou/uL    MPV 7.6 7.0 - 10.0 fL   Glycosylated Hemoglobin A1c   Result Value Ref Range    Hemoglobin A1c 7.8 (H) 3.5 - 6.0 %   Thyroid Stimulating Hormone (TSH)   Result Value Ref Range    TSH 1.86 0.30 - 5.00 uIU/mL   Urinalysis-UC if Indicated   Result Value Ref Range    Color, UA Yellow Colorless, Yellow, Straw, Light Yellow    Clarity, UA Clear Clear    Glucose, UA Negative Negative    Bilirubin, UA Negative Negative    Ketones, UA Negative Negative    Specific Gravity, UA 1.020 1.005 - 1.030    Blood, UA Negative Negative    pH, UA 6.5 5.0 - 8.0    Protein, UA Negative Negative mg/dL    Urobilinogen, UA 0.2 E.U./dL 0.2 E.U./dL, 1.0 E.U./dL    Nitrite, UA Negative Negative    Leukocytes, UA Negative Negative    Narrative    Microscopic not indicated  UC not indicated   PSA (Prostatic-Specific Antigen), Annual Screen   Result Value Ref Range    PSA 1.3 0.0 - 3.5 ng/mL    Narrative    Method is Abbott Prostate-Specific Antigen (PSA)  Standard-WHO 1st International (90:10)   Basic Metabolic Panel   Result Value Ref Range    Sodium 132 (L) 136 - 145 mmol/L    Potassium 4.1 3.5 - 5.0 mmol/L    Chloride 95 (L) 98 - 107 mmol/L    CO2 27 22 - 31 mmol/L    Anion Gap, Calculation 10 5 - 18 mmol/L    Glucose 85 70 - 125 mg/dL    Calcium 9.6 8.5 - 10.5 mg/dL    BUN 17  8 - 22 mg/dL    Creatinine 1.52 (H) 0.70 - 1.30 mg/dL    GFR MDRD Af Amer 57 (L) >60 mL/min/1.73m2    GFR MDRD Non Af Amer 47 (L) >60 mL/min/1.73m2    Narrative    Fasting Glucose reference range is 70-99 mg/dL per  American Diabetes Association (ADA) guidelines.   Hepatic Profile   Result Value Ref Range    Bilirubin, Total 0.6 0.0 - 1.0 mg/dL    Bilirubin, Direct 0.2 <=0.5 mg/dL    Protein, Total 7.8 6.0 - 8.0 g/dL    Albumin 4.3 3.5 - 5.0 g/dL    Alkaline Phosphatase 79 45 - 120 U/L    AST 30 0 - 40 U/L    ALT 23 0 - 45 U/L   Microalbumin, Random Urine   Result Value Ref Range    Microalbumin, Random Urine 1.03 0.00 - 1.99 mg/dL    Creatinine, Urine 109.9 mg/dL    Microalbumin/Creatinine Ratio Random Urine 9.4 <=19.9 mg/g    Narrative    Microalbumin, Random Urine  <2.0 mg/dL . . . . . . . . Normal  3.0-30.0 mg/dL . . . . . . Microalbuminuria  >30.0 mg/dL . . . . . .  . Clinical Proteinuria    Microalbumin/Creatinine Ratio, Random Urine  <20 mg/g . . . . .. . . . Normal   mg/g . . . . . . . Microalbuminuria  >300 mg/g . . . . . . . . Clinical Proteinuria           Abnormal kidney function due to your long-standing diabetes.  Please see me for this.    Bit improved diabetes control, A1c of 7.8.  Please continue same medications.  Thank you.    Please call with questions or contact us using [facility].    Sincerely,        Electronically signed by Chele Hodge MD

## 2021-06-19 NOTE — LETTER
Letter by Chele Hodge MD at      Author: Chele Hodge MD Service: -- Author Type: --    Filed:  Encounter Date: 5/21/2019 Status: (Other)         Oscar Mojica  [address]  [place] [address]             May 22, 2019         Dear Mr. Mojica,    Below are the results from your recent visit:    Resulted Orders   Lipid Cascade   Result Value Ref Range    Cholesterol 190 <=199 mg/dL    Triglycerides 310 (H) <=149 mg/dL    HDL Cholesterol 30 (L) >=40 mg/dL    LDL Calculated 98 <=129 mg/dL    Patient Fasting > 8hrs? Unknown    Thyroid Stimulating Hormone (TSH)   Result Value Ref Range    TSH 1.75 0.30 - 5.00 uIU/mL   Basic Metabolic Panel   Result Value Ref Range    Sodium 135 (L) 136 - 145 mmol/L    Potassium 4.0 3.5 - 5.0 mmol/L    Chloride 97 (L) 98 - 107 mmol/L    CO2 27 22 - 31 mmol/L    Anion Gap, Calculation 11 5 - 18 mmol/L    Glucose 130 (H) 70 - 125 mg/dL    Calcium 9.8 8.5 - 10.5 mg/dL    BUN 16 8 - 22 mg/dL    Creatinine 1.19 0.70 - 1.30 mg/dL    GFR MDRD Af Amer >60 >60 mL/min/1.73m2    GFR MDRD Non Af Amer >60 >60 mL/min/1.73m2    Narrative    Fasting Glucose reference range is 70-99 mg/dL per  American Diabetes Association (ADA) guidelines.   Hepatic Profile   Result Value Ref Range    Bilirubin, Total 0.4 0.0 - 1.0 mg/dL    Bilirubin, Direct 0.1 <=0.5 mg/dL    Protein, Total 7.6 6.0 - 8.0 g/dL    Albumin 3.8 3.5 - 5.0 g/dL    Alkaline Phosphatase 75 45 - 120 U/L    AST 22 0 - 40 U/L    ALT 16 0 - 45 U/L   HM2(CBC w/o Differential)   Result Value Ref Range    WBC 7.2 4.0 - 11.0 thou/uL    RBC 5.06 4.40 - 6.20 mill/uL    Hemoglobin 13.2 (L) 14.0 - 18.0 g/dL    Hematocrit 39.1 (L) 40.0 - 54.0 %    MCV 77 (L) 80 - 100 fL    MCH 26.0 (L) 27.0 - 34.0 pg    MCHC 33.7 32.0 - 36.0 g/dL    RDW 12.2 11.0 - 14.5 %    Platelets 268 140 - 440 thou/uL    MPV 7.7 7.0 - 10.0 fL       Triglycerides are still increased.  I  like you to start fenofibrate 48 mg daily.  Prescription was sent to your  pharmacy.  Rest of your lipid profile are normal.      Normal all your other labs.  Continue all medications.  Thank you.    Please call with questions or contact us using [employer].    Sincerely,        Electronically signed by Chele Hodge MD

## 2021-06-19 NOTE — LETTER
Letter by Francisca Rollins CHW at      Author: Francisca Rollins CHW Service: -- Author Type: --    Filed:  Encounter Date: 12/5/2019 Status: Signed         December 5, 2019            Oscar Mojica  [address]  [place] [address]        Dear Oscar,                                                                                                                                You enrolled with the [facility] Care Coordination Services.  In order for us to provide guidance we need to connect on a monthly bases.  We have tried calling you 2 times in the last month and have been unsuccessful in reaching you. Please call me at [phone] at your earliest convenience.  If you reach my voicemail, please leave a message with your daytime telephone number and a date and time that I can return your call.                                                                            Sincerely,        NIDHI Espinosa                                                                     Clinic Care Coordination                                          [facility]

## 2021-06-19 NOTE — PROGRESS NOTES
This writer called the patient since he was no show for his scheduled appointment today. This writer provided the clinic number to confirm his next appointment.    Performed and documented by GLENNY Dias- RIGOBERTO; [facility] 7/16/2018

## 2021-06-19 NOTE — LETTER
Letter by Margareth Torres RD at      Author: Margareth Torres RD Service: -- Author Type: --    Filed:  Encounter Date: 3/18/2019 Status: (Other)         Oscar Mojica  [address]  [place] [address]               March 18, 2019      Dear Oscar:    We are sorry that you missed your appointment with Thi for four appointments3/18/2019. Your health and follow-up medical care are important to us. Please call our office as soon as possible so that we may reschedule your appointment. If you have already rescheduled your appointment, please disregard this letter.    Sincerely,        Margareth Torres RD

## 2021-06-19 NOTE — PROGRESS NOTES
"    7/17/2018    Start time: 10:05    Stop Time:11: 00  Session # 9    Oscar Mojica is a 57 y.o. male is being seen today for    Chief Complaint   Patient presents with      Follow Up      Treatment Plan     Anxiety     Depression   .   Follow up in regards to ongoing symptom management of anxiety and depression.     New symptoms or complaints: \" weight issue and memory issue\"    Functional Impairment:   Personal: 4  Family: 4  Social: 4  Work: 0    Clinical assessment of mental status:   Oscar Mojica presented on time.   He was oriented x3, open and cooperative, and dressed appropriately for this session and weather. His memory was Moderate  .  His speech was  Soft.  Language was appropriate.  Concentration and focus is Within normal. Psychosis is not noted or reported. He reports his mood is Depressed.  Affect is congruent with speech and is Anxious and Depressed.  Fund of knowledge is adequate. Insight is adequate for therapy.    Suicidal/Homicidal Ideation present: Patient denies suicidal and homicidal ideations/means or plans.     Patient's impression of their current status: Patient returned for therapy session today. He notes memory issues. He has no idea about what happened last week. He came in with a bike but made it here too late after 5. The clinic was closed but a staff saw him and talked to him. Patient does not recall any of this. Patient also has been struggling to remember other important information. He is now having trouble with his schedule. He ends up overlapping his appointments. He was encouraged to work with his [employer] worker. Patient also is having a hard tome to keep a health diet. He has been eating cookies and ice cream at [employer]. He is planning to see dietician and he understands he will need to do his part like refraining from buying ice cream or cookies as he now does.  He will return in 2 weeks for more support.    Therapist impression of patients current state: This 57 " y.o. Black or  male presented with some complaints around his weight gain and struggle to cut down his sugar intake. Writer processed with the patient his presenting issue.  reviewed with the patient what works and how he can focus on the positive. He tends to be hard on himself in regard to the weight gain except he has no education around the proper diet and how he can avoid unhealthy food. Patient seems to do well with some patient's centered approach, some moderated CBT skills and MI skills. He responds well with empathy and positive regards.     Assessment tools used today include: NOREEN-7:9, PHQ-9:10,CAGE-AID:4/4,  denied any past or current SI/HI     Type of psychotherapeutic technique provided: Insight oriented, Client centered, Solution-focused, CBT and Updated treatment Plan    Progress toward short term goals:reports increased symptoms of depression and worries about weight gain    Review of long term goals: Treatment Plan updated : 7/17/2018 next review: 10/15/2018     Diagnosis:   1. Moderate single current episode of major depressive disorder (H)    2. Generalized anxiety disorder    3. Tobacco use disorder     No change    Plan and Follow-up:   Goals were reviewed by patient and tharpist:  1.  Patient plans to continue taking his medications as prescribed.  2.  Patient plans to remain sober from any mood altering substances.  3.  Patient plans to cut down his sugar intake from coffee and cookies.  4.  Patient plans to exercise every day for at least 30 minutes  5. Patient will review his schedule with his ARM to makes he attends all his appointments.   6.  Patient will return in 3 weeks    Discharge Criteria/Planning: Patient will continue with follow-up until therapy can be discontinued without return of signs and symptoms.    Performed and documented by GLENNY Dias- RIGOBERTO; [facility] [place][note 2] 7/17/2018

## 2021-06-19 NOTE — PROGRESS NOTES
Office Visit - Follow Up   Oscar Mojica   57 y.o. male    Date of Visit: 7/18/2018    Chief Complaint   Patient presents with     Back Pain     states pain is controlled with meloxicam and tramadol         Assessment and Plan   1. Back pain  Now much improved.  Was seen by orthopedics.  Was assessed to have mechanical back pains.  Was sent for physical therapy which he finished.  Now going to a chiropractor in which he is getting some back treatment also.  Takes meloxicam and tramadol which also seems to help.  Okay to continue chiropractic treatment.  - meloxicam (MOBIC) 15 MG tablet; Take 1 tablet (15 mg total) by mouth daily.  Dispense: 90 tablet; Refill: 3    2. Major depression  Has major depression.  Followed by psychiatry.  Takes sertraline.  PHQ 9 is down to 5.    3. Essential hypertension  Controlled.  Continue hydrochlorothiazide.    4. Screen for colon cancer  Agrees to have colonoscopy.  But wants to wait.  Wants to do first his sleep study and dietary consult for weight loss.  - Ambulatory referral for Colonoscopy      Follow up in 4 months.     History of Present Illness   This 57 y.o. old male is here for follow-up.  Was seen in April for back pains.  Was referred to orthopedics.  Was assessed to have mechanical back pains.  Was sent for physical therapy which he finished.  Now getting some chiropractic treatment.  Also takes meloxicam and tramadol which ease or help his pains.  Has major depression.  Followed by psychiatry.  PHQ 9 score is now better.  Has hypertension controlled by hydrochlorothiazide.  Overall feels well.  No other complaints.    Review of Systems   A 12 point comprehensive review of systems was negative except as noted..     Medications, Allergies and Problem List   Reviewed and updated             Chief Complaint   Back Pain (states pain is controlled with meloxicam and tramadol )       Patient Profile   Social History     Social History Narrative    Single. No children. Under  "disability.         Past Medical History   Patient Active Problem List   Diagnosis     Major depression     Essential hypertension     Left knee pain     Back pain       Past Surgical History  He has no past surgical history on file.       Medications and Allergies   Current Outpatient Prescriptions   Medication Sig     carboxymethylcellulose sodium 0.5 % Drop 2 drops 3 times daily for dry eyes.     cholecalciferol, vitamin D3, (VITAMIN D3) 5,000 unit Tab Once daily     clotrimazole (LOTRIMIN) 1 % cream APPLY TOPICALLY TWICE DAILY     diclofenac sodium (VOLTAREN) 1 % Gel APPLY 2 GRAMS TOPICALLY QID.     hydroCHLOROthiazide (HYDRODIURIL) 25 MG tablet Take 25 mg by mouth daily.     meloxicam (MOBIC) 15 MG tablet Take 1 tablet (15 mg total) by mouth daily.     methadone (DOLOPHINE) 10 mg/5 mL solution Take 45 mg by mouth every 12 (twelve) hours.     omeprazole (PRILOSEC) 20 MG capsule TK 1 C PO D 1 HOUR AC     potassium chloride SA (K-DUR,KLOR-CON) 20 MEQ tablet TK 1 T PO Q 24 H     sertraline (ZOLOFT) 100 MG tablet Take 1 tablet (100 mg total) by mouth at bedtime.     traMADol (ULTRAM) 50 mg tablet Take 1 tablet (50 mg total) by mouth 3 (three) times a day as needed for pain.     traMADol (ULTRAM) 50 mg tablet TAKE 1 TABLET(50 MG) BY MOUTH THREE TIMES DAILY AS NEEDED FOR PAIN     Allergies   Allergen Reactions     Hay Fever And Allergy Relief      Penicillins Nausea Only        Family and Social History   No family history on file.     Social History   Substance Use Topics     Smoking status: Current Every Day Smoker     Packs/day: 0.50     Types: Cigarettes     Smokeless tobacco: Never Used     Alcohol use No      Comment: 9 months sober.        Physical Exam       Physical Exam  /60  Pulse 83  Ht 5' 2\" (1.575 m)  Wt 210 lb (95.3 kg)  SpO2 95%  BMI 38.41 kg/m2  General appearance: alert, appears stated age, cooperative and no distress  Head: Normocephalic, without obvious abnormality, atraumatic  Throat: " lips, mucosa, and tongue normal; teeth and gums normal  Neck: no adenopathy, no carotid bruit, no JVD, supple, symmetrical, trachea midline and thyroid not enlarged, symmetric, no tenderness/mass/nodules  Lungs: clear to auscultation bilaterally  Heart: regular rate and rhythm, S1, S2 normal, no murmur, click, rub or gallop  Abdomen: soft, non-tender; bowel sounds normal; no masses,  no organomegaly  Extremities: extremities normal, atraumatic, no cyanosis or edema  Skin: Skin color, texture, turgor normal. No rashes or lesions  Neurologic: Grossly normal  Musculoskeletal: Improved back pains with better range of motion     Additional Information        Chele Hodge MD  Internal Medicine  Contact me at [phone]     Additional Information   Current Outpatient Prescriptions   Medication Sig     carboxymethylcellulose sodium 0.5 % Drop 2 drops 3 times daily for dry eyes.     cholecalciferol, vitamin D3, (VITAMIN D3) 5,000 unit Tab Once daily     clotrimazole (LOTRIMIN) 1 % cream APPLY TOPICALLY TWICE DAILY     diclofenac sodium (VOLTAREN) 1 % Gel APPLY 2 GRAMS TOPICALLY QID.     hydroCHLOROthiazide (HYDRODIURIL) 25 MG tablet Take 25 mg by mouth daily.     meloxicam (MOBIC) 15 MG tablet Take 1 tablet (15 mg total) by mouth daily.     methadone (DOLOPHINE) 10 mg/5 mL solution Take 45 mg by mouth every 12 (twelve) hours.     omeprazole (PRILOSEC) 20 MG capsule TK 1 C PO D 1 HOUR AC     potassium chloride SA (K-DUR,KLOR-CON) 20 MEQ tablet TK 1 T PO Q 24 H     sertraline (ZOLOFT) 100 MG tablet Take 1 tablet (100 mg total) by mouth at bedtime.     traMADol (ULTRAM) 50 mg tablet Take 1 tablet (50 mg total) by mouth 3 (three) times a day as needed for pain.     traMADol (ULTRAM) 50 mg tablet TAKE 1 TABLET(50 MG) BY MOUTH THREE TIMES DAILY AS NEEDED FOR PAIN     Allergies   Allergen Reactions     Hay Fever And Allergy Relief      Penicillins Nausea Only     Social History   Substance Use Topics     Smoking status:  Current Every Day Smoker     Packs/day: 0.50     Types: Cigarettes     Smokeless tobacco: Never Used     Alcohol use No      Comment: 9 months sober.         Time: total time spent with the patient was 25 minutes of which >50% was spent in counseling and coordination of care

## 2021-06-20 NOTE — PROGRESS NOTES
Order for Durable Medical Equipment was processed and equipment ordered.     DME provider: Amanda    Date Faxed: 9/27/18    Ordering Provider: Dr. Israel    Equipment ordered: CPAP

## 2021-06-20 NOTE — PROGRESS NOTES
Discharge Summary    Dates of Service:3/05/2018 to 9/07/2018   # of sessions completed: 13    Diagnoses at Intake:       1. Major depressive disorder, recurrent, moderate- Also per patient, ACP  2. Generalized anxiety disorder-Also per patient and ACP  3.Tobacco use disorder- also per patient and ACP  4.Heroin use disorder, severe, on maintenance therapy also per patient and ACP  5.Alcohol use disorder, severe, in remission- also per patient and ACP  6.Cannabis dependence in remission- Also per patient and ACP     Diagnosis at Discharge:   1. Major depressive disorder, recurrent, moderate- Also per patient, ACP  2. Generalized anxiety disorder-Also per patient and ACP  3.Tobacco use disorder- also per patient and ACP  4.Heroin use disorder, severe, on maintenance therapy also per patient and ACP  5.Alcohol use disorder, severe, in remission- also per patient and ACP  6.Cannabis dependence in remission- Also per patient and ACP    Progress toward goal 1: partially met    Progress toward goal 2: partially met    Progress toward goal 2: partially met    Additional goals: None    Additional comments: Patient has not been consistent in therapy. He has missed 4 scheduled appointments. Arrangements were made for him to come in at different hours when he had missed his time. Patient himself made a decision to switch sites from [facility] to [facility]. At this first session here at [facility], He missed his time again and arrangement was made to be seen outside of this writer's work hours. At his second scheduled appointment, patient was no show. It was clear that despite his willingness to be in therapy, he is not able to follow through. For this reason, patient is being discharged.     Reason for discharge: Patient  Is being discharged due to many no shows    Prognosis for discharge: Guarded        Recommendations and referrals at discharge: Writer discussed with the patient possible options to get a different therapist. Writer  will also mail the patient with resources.     Performed and documented by JOANNE DiasSW; [facility] 9/20/2018      ___________________________________________________      Date_____________  Psychotherapist

## 2021-06-20 NOTE — PROGRESS NOTES
"  Mental Health Visit Note    9/7/2018    Start time: 4:00    Stop Time: 4:50   Session # 12    Session Type: Patient is presenting for an Individual session.    Oscar Mojica is a 58 y.o. male is being seen today for    Chief Complaint   Patient presents with      Follow Up     Anxiety     Depression     interpersonal stress     Follow up in regards to ongoing symptom management of anxiety and depression.     New symptoms or complaints: \" weight gain. memory problems\"    Functional Impairment:   Personal: 4  Family: 3  Social: 3  Work: 0    Clinical assessment of mental status:   Oscar Mojica presented on time.   He was oriented x3, open and cooperative, and dressed appropriately for this session and weather. His memory was Moderate  .  His speech was  Pressured.  Language was appropriate  Concentration and focus is Within normal. Psychosis is not noted or reported. He reports his mood is Depressed.  Affect is congruent with speech and is Depressed and worries.  Fund of knowledge is adequate. Insight is adequate for therapy.    Suicidal/Homicidal Ideation present: Patient denies suicidal and homicidal ideations/means or plans.     Patient's impression of their current status: Patient presented today to this site for the first time.  He reports having had forgotten his appointment today and depressed appreciation for the arrangement made to be seen at different hour. Patient reports having problems with memory. He wants to continue working with his arms worker to help him remembering his appointments. Patient reports ongoing struggle to lose weight.  He reports no support and continues to fall in a trap with sweets and junk food.  Patient also reports making progress with his sobriety.  This month on the 27th will be his one-year anniversary of sobriety.  He continues working with [facility] team for his methadone maintenance and counseling. Patient notes he has not been to [facility] for awhile for his spiritual goal. " Patient has a goal to earn his GED and get his 's license. He notes all these depend on his health. So health comes in as his priority. He admitted that he needs some education around his diet. He also is struggling to keep off his tobacco use.   Patient will return in 2 weeks for more support.    Therapist impression of patients current state: This 58 y.o. Black or  male did not come a the initial scheduled hour which was at 2 pm. He was seen rather at 4 pm after realizing that he would be discharged should he have another no chow. Patient is notably struggling with memory problems. He was reminded to continue working with his [employer] worker to help with reminders.  Writer reminded the patient about the consent policy and consequences of not attending his appointments. He appears to be insightful about the need for his care. However, it is noted that he has some memory deficits that are affecting his daily activities.  Patient to continue to benefit from different modalities that include patient centered, motivational interviewing, simple reflections, and open ended questions.    Assessment tools used today include: How do you feel today?.     Type of psychotherapeutic technique provided: Insight oriented, Client centered, Solution-focused and CBT    Progress toward short term goals:reports he remains sober. will be one year sober on 9/27.    Review of long term goals: Treatment Plan updated: 7/17/2018 next review: 10/15/2018     Diagnosis:   1. Moderate single current episode of major depressive disorder (H)    2. Generalized anxiety disorder    3. Tobacco use disorder    4. Heroin use disorder, severe, on maintenance therapy (H)    No change    Plan and Follow-up:  Goals reviewed and updated with the patient:  1. Patient will continue taking his medications as prescribed  2. Patient will continue working with his [facility] weekly to meet his needs  3.Patient will refrain from eating junk  food  4.Patient will enroll at [facility] this week  5.Patient will attend [employer] for his spiritual growth  6.Patient will attend his self help meetings this week  7 Patient will return in 2 weeks for more support    Discharge Criteria/Planning: Patient will continue with follow-up until therapy can be discontinued without return of signs and symptoms.    Performed and documented by GLENNY Dias- RIGOBERTO; [facility] 9/7/2018

## 2021-06-20 NOTE — LETTER
Letter by Francisca Rollins CHW at      Author: Francisca Rollins CHW Service: -- Author Type: --    Filed:  Encounter Date: 9/4/2020 Status: (Other)         September 4, 2020            Oscar Mojica  [address]  [place] [address]        Dear Oscar,                                                                                                                                  You enrolled with the [facility] Care Coordination [facility].  In order for us to provide guidance we need to connect on a monthly bases.  We have tried calling you 2 times in the last month and have been unsuccessful in reaching you. Please call me at [phone] at your earliest convenience.  If you reach my voicemail, please leave a message with your daytime telephone number and a date and time that I can return your call.                                                                            Sincerely,        NIDHI Espinosa                                                                     Clinic Care Coordination                                          [facility]

## 2021-06-20 NOTE — LETTER
Letter by Chele Hodge MD at      Author: Chele Hodge MD Service: -- Author Type: --    Filed:  Encounter Date: 9/10/2020 Status: (Other)         Oscar Mojica  [address]  [place] [address]             September 10, 2020         Dear Mr. Mojica,    Below are the results from your recent visit:    Resulted Orders   XR Chest 2 Views    Narrative    EXAM: XR CHEST 2 VIEWS  LOCATION: [facility]  DATE/TIME: 9/9/2020 12:16 PM    INDICATION: Nonspecific reaction to tuberculin skin test without active tuberculosis  COMPARISON: 6/6/2018      Impression    The cardiomediastinal silhouette and pulmonary vascularity are normal. No focal consolidation, pneumothorax nor pleural effusion. No evidence of active TB.       Normal chest xray. No TB. Good!    Please call with questions or contact us using [employer].    Sincerely,        Electronically signed by Chele Hodge MD

## 2021-06-20 NOTE — LETTER
Letter by Merry Mora SW at      Author: Merry Mora SW Service: -- Author Type: --    Filed:  Encounter Date: 9/23/2020 Status: (Other)         September 23, 2020            Oscar Mojica  [address]  [place] [address]        Dear Oscar,    After we met  to discuss your goals and how as a Community Health Worker, I can work to keep you connected to your provider and care team, we both agreed that in order to best help give you the access, support and resources you need to reach your goals; and most importantly, for you to get and stay healthy, we would connect [phone].    I have been unable to reach you for 3 months.      I am writing to ask you, a family member or your representative to call me at [phone].  If you reach my voicemail, please leave a message with your daytime telephone number and a date and time that I can call you.      Please call me as soon as you receive this letter.  I look forward to speaking with you.          Sincerely,  Francisca Rollins, Community Health Worker  [facility]

## 2021-06-20 NOTE — PROGRESS NOTES
Non-surgical Weight Loss Follow Up Diet Evaluation    Assessment:  This patient is a 58 y.o. male is being seen today for follow-up non-surgical nutritional evaluation. Today we reviewed the patients current eating habits and level of physical activity, and instructed on the changes that are required for successful weight loss outcomes.    Pt's Initial Weight: 214 lbs  Weight: 217 lb 8 oz (98.7 kg)  Weight loss from initial: -3.5  % Weight loss: -1.64 %  BMI: Body mass index is 39.78 kg/(m^2).  IBW: 112 lbs    Personal goal weight: 150-160 lb      Pt Active Problem List Diagnosis:     Patient Active Problem List   Diagnosis     Major depression     Essential hypertension     Left knee pain     Back pain     Estimated RMR ([place]-St Jeor equation): 1722 calories  Protein requirements (.5grams to .9grams per pound IBW, 20-30% of calories, minimum of 60-80gm per day):   grams    Progress made since last visit: [employer] causing craving sweets. Pt replaced candy bars with chocolate cookies (10 per day) and coffee and doughnuts. Pt reports now switching to small sized candy bars- states now eating 1/2 package instead of whole.     Concerns: Poor nutrition education and understanding.     Diet Recall/Time:   Breakfast: 2 bowls of cereal, 2% milk, 1 twinkie (10g)   Am Snack: none   Lunch: 2 double cheese sliders with fries, HiC juice (10g)   Pm snack: ice cream, cupcake, twinkie   Dinner: peanut butter and jelly sandwich (5g)   HS Snack: none     Protein: 15-25 grams    Meals per week away from home: 2-3x/week      Recommended limiting eating out to no more than 2x/week.  Patient and I reviewed the importance of eating three consistent meals per day; as well as meal timing to be spaced 4-5 hours apart.  Snack choices: 100-150 calories (1-2x/day if physically hungry), incorporating a fruit/vegetable w/ protein source.    Meal Duration: not discussed    Portion Sizes problematic? YES per patient/diet  recall  Encouraged slowing meal times down, 20-30 minutes, chewing to applesauce consistency.   To aid in proper portion control and slow meal time down discussed consuming meals off smaller plates, use toddler/children utensils and set utensils down after each bite.    Protein, vegetables/fruits, carbohydrates:   The patient and I discussed the importance of including lean/low fat protein at each meal and limiting carbohydrate intake to less than 25% of plate volume.       Vitamins/Mineral Supplementation:not discussed     Beverages (Type/Oz. per day)  Water: 12 oz   Coffee: 2 cups   Tea: none   Milk: 2% cups   Regular soda: 20 oz every other day   Diet soda: none   Juice: HiC juice   Álvaro-Aid/lemonade/etc: none   Alcohol: none     Discussed the importance of adequate hydration and the goal of 64+ oz of fluid daily.   The patient understands the importance of  avoiding all sweetened and alcoholic drinks, and instead choosing 64 oz plain water.    Exercise  Nothing routine established.     Pt's understands that 45-60 minutes of daily activity is an important part of weight loss success.   Encouraged pt to incorporate  strength training exercise in addition to cardiovascular exercise most days of the week.    PES statement:     1.  (NI-1.3)Excessive energy intake related to Food and nutrition related knowledge deficit concerning excessive energy/oral intake as evidenced by Intake of high caloric density foods at meals and/or snacks; large portion; frequent grazing; Estimated intake that exceeds estimated daily energy intake; Binge eating patterns; Frequent excessive fast food or restaurant intake; and BMI 39.        Intervention:  Discussion:  1. Educated pt on the food groups using food models and pictures  2. Spent visit helping patient identify food groups by putting together practice meal.s     Instructions/Goals:   1. Include protein at each meal.  2. Increase vegetable/fruit intake, by having a vegetable or  fruit with each meal daily. Recommended pt to increase vegetable/fruit intake to 4-5 servings daily.  3. Increase fluid intake to 64oz daily: choose plain or calorie/alcohol-free beverages.  4. Incorporate daily structured activity, 45-60 minutes most days of the week  5. Practice eating off of smaller plates/bowls, chewing to applesauce consistency, taking 20-30 minutes to eat in a calm/relaxed environment without distractions of tv/email/cell phone.    Handouts Provided:  Food groups    Monitor/Evaluation:    Pt will f/u in one month with RD.    Plan for next visit with RD:  Review food groups  Educate pt on portion sizes.       Time In: 2:00pm  Time Out: 2:45pm      ABN signed: Yes

## 2021-06-20 NOTE — PROGRESS NOTES
Non-surgical Weight Loss Initial Diet Evaluation     Assessment:  Pt is a 57 y.o. male being seen today for non-surgical RD nutritional evaluation. Today we reviewed current eating habits and level of physical activity, and instructed on the changes that are required for successful weight loss outcomes.    Pt reports having issues long term being overweight and doesn't know how to diet. Pt reports medication has influenced his weight gain. Currently going through classes to prevent use of drugs/alcohol attends 3x/week. Pt reports 11 months sober from alcohol and drugs. Since being sober, pt reports increasing sweet intake.   *Pt noticeably tired, red-eyed, and unable to focus during visit. Writer suspected drug use, however, pt denied. Visit time shorten d/t patient's current state.     Personal Goals: weight loss, be more active.   Personal goal weight: 150-160 lb.      Pt Active Problem List Diagnosis:     Patient Active Problem List   Diagnosis     Major depression     Essential hypertension     Left knee pain     Back pain     Pt's Initial Weight: 214 lbs  Weight: 215 lb (97.5 kg)  Weight loss from initial: -1  % Weight loss: -0.47 %  BMI: Body mass index is 39.32 kg/(m^2).  IBW: 118 lbs    Estimated RMR ([place]-St Jeor equation): 1772 calories  Protein requirements (.5grams to .9grams per pound IBW, 20-30% of calories, minimum of 60-80gm per day):   grams     Food allergies, intolerances, Yazidism customs: Pork allergy.     Vitamins/Mineral Supplementation: MVI, vitamin D.     Biggest struggle with weight loss: sweet tooth, frequent eating out.    Who does the grocery shopping for your household? Pt and girlfriend   Who prepares your meals at home? Pt and girlfriend     Diet Recall/Time: Pt wakes up 8:00am  Breakfast: coffee, sweet roll or cookies  Am Snack: chocolate bar  Lunch: ham sandwich, potato chips (15g)   Pm snack:chocolate bar   Dinner: 1-2 sloppy joes (20g)   HS Snack: chocolate     *Pt  reports eating 3-4 candy bars per day. Pt goes to the dollar store frequently to get candy/chocolate.     Protein: 30-40 gm     Fried Foods: 0-1 times per week    Meals per week away from home:  2x/week   Sit down: none   Fast Food: subway, Mcdonalds, [employer]   Take Out: pizza  2x/month   Delivery: none   Buffet: none   Cafeteria: none   Specialty Coffee: periodically   Ice Cream/FrozenYogurt/Bakery: none  Comments: none    Recommended limiting eating out to no more than 2x/week.  Patient and I reviewed the importance of eating three consistent meals per day; as well as meal timing to be spaced 4-5 hours apart.  Snack choices: 100-150 calories (1-2x/day if physically hungry), incorporating a fruit/vegetable w/ protein source.    Portion Sizes problematic? yes per patient/diet recall  Encouraged slowing meal times down, 20-30 minutes, chewing to applesauce consistency.   To aid in proper portion control and slow meal time down discussed consuming meals off smaller plates, use toddler/children utensils and set utensils down after each bite.    Protein, vegetables/fruits, carbohydrates:   Reviewed lean protein sources today. Recommended consuming 20-30 gm protein at 3 meals daily.  The patient and I discussed the importance of including lean/low fat protein at each meal and limiting carbohydrate intake to less than 25% of plate volume.     Beverages (Type/Oz. per day)  Water: 48 oz   Coffee: 2 cups  Tea: none   Milk: w/ cereal  Regular soda: 2, 20 oz root beer   Diet soda: none   Juice: orange juice periodically   Álvaro-Aid/lemonade/etc: none   Alcohol: none     Discussed the importance of adequate hydration and the goal of 64+ oz of fluid daily.   The patient understands the importance of avoiding all alcoholic and sweetened drinks, and instead choosing 64 oz plain water.    Exercise  Nothing routine established.     Pt's understands that 45-60 minutes of daily activity is an important part of weight loss success.    Encouraged pt to incorporate upper body strength training exercise, even if its lifting soup cans while watching tv at night, doing push ups/sit-ups, and abdominal work.    PES statement:    1. (NI-1.3)Excessive energy intake related to Food and nutrition related knowledge deficit concerning excessive energy/oral intake as evidenced by Intake of high caloric density foods/beverages (soda) at meals and/or snacks; large portions; frequent grazing; Estimated intake that exceeds estimated daily energy intake; Binge eating patterns; Frequent excessive fast food or restaurant intake; and BMI 39.       Intervention  Discussion:  1. Educated pt on role excess sugar plays in weight gain.  2. Discussed importance of adequate hydration  3. Answered patients questions regarding exercise     Instructions/Goals:   1. Include 20-30 gm protein at each meal.  2. Increase vegetable/fruit intake, by having a vegetable or fruit with each meal daily. Recommended pt to increase vegetable/fruit intake to 4-5 servings daily.  3. Increase fluid intake to 64oz daily: choose plain or calorie/alcohol-free beverages.  4. Incorporate daily structured activity, 45-60 minutes most days of the week  5. Read food labels more consistently: keeping total fat grams <10, total sugar grams <10, fiber >3gm per serving.  6. Practice plate method: 1/2 plate lean/low fat protein source, vegetable/fruit, <25% of plate complex carbohydrates.  7. Practice eating off of smaller plates/bowls, chewing to applesauce consistency, taking 20-30 minutes to eat in a calm/relaxed environment without distractions of tv/email/cell phone.    Goals set by patient:  1. Reduce candy bar itnake to 1 bar/day  2. Try hard candies or mints  3. Reduce soda to 1 bottle per day.     Handouts Provided:  Goal sheet     Monitor/Evaluation:    Pt will f/u in one month with bariatrician, and f/u in two months with RD.    Plan for next visit with RD:    Review goals   Educate on the food  groups       Time In: 10:30am  Time Out: 11:00am      ABN signed: Yes

## 2021-06-20 NOTE — PROGRESS NOTES
Writer called to check where the patient was after waiting for 15 minutes. Patient stated he was on the bus go [place] for a . Patient was reminded about no show. Today is the 3rd no show. For this reason, he will be discharged. Patient will follow up with his [facility] worker who is with [facility] for a different therapist.  Patient was agreeable with this plan. Writer made sure that the patient is not being discharged from psychiatry as it is his psychiatrist's decision when they see there is a reason to do so. An official letter for a 3rd no show will be mailed to the patient.   Performed and documented by GLENNY Dias- RIGOBERTO; [facility] 2018

## 2021-06-20 NOTE — LETTER
Letter by Chele Hodge MD at      Author: Chele Hodge MD Service: -- Author Type: --    Filed:  Encounter Date: 6/16/2020 Status: (Other)         June 16, 2020     Patient: Oscar Mojica   YOB: 1960   Date of Visit: 6/16/2020       To Whom It May Concern:    It is my medical opinion that Oscar Mojica needs an air conditioner in his place of residence due to his multiple medical problems which are affected by significant heat and increased humidity this time of the year.    Thank you for your kind attention  and consideration on this matter.    If you have any questions or concerns, please don't hesitate to call.    Sincerely,        Electronically signed by Chele Hodge MD

## 2021-06-20 NOTE — PROGRESS NOTES
Dear Dr. Chele Hodge Md  [address] [place] [address]    Thank you for the opportunity to participate in the care of . Oscar Mojica.    He is a 58 y.o. male who comes to the clinic with a chief complaint of excessive daytime sleepiness that is been going on for many years.  The patient has been told that he has significant pauses in his breathing during sleep followed by loud snoring.  He would often wake up feeling as if he is drowning.  The patient underwent a direct sleep study on 08/31/18 as part of his candidacy for bariatric surgery.  His apnea hypopnea index was grossly elevated at 48.3 events per hour with the lowest O2 sat of 73%.  The patient's review of systems is otherwise unremarkable.     Ideal Sleep-Wake Cycle(devoid of societal pressure):    Patient would try to initiate sleep at around 11 PM with a sleep latency of 10 minutes. The patient would have 3 awakening. Final wake up time is around 8 AM.    Past Medical History  Past Medical History:   Diagnosis Date     Alcoholism (H)      Arthritis      Depression      Hepatitis C     s/p treatment for 90 days.     Hypertension      Substance abuse     hx of alcohol, cocain, heroin        Past Surgical History  No past surgical history on file.     Meds  Current Outpatient Prescriptions   Medication Sig Dispense Refill     carboxymethylcellulose sodium 0.5 % Drop 2 drops 3 times daily for dry eyes. 30 mL 3     cholecalciferol, vitamin D3, (VITAMIN D3) 5,000 unit Tab Once daily 90 each 3     clotrimazole (LOTRIMIN) 1 % cream APPLY EXTERNALLY TO THE AFFECTED AREA TWICE DAILY 30 g 0     diclofenac sodium (VOLTAREN) 1 % Gel APPLY 2 GRAMS TOPICALLY QID.  6     hydroCHLOROthiazide (HYDRODIURIL) 25 MG tablet Take 25 mg by mouth daily.       meloxicam (MOBIC) 15 MG tablet Take 1 tablet (15 mg total) by mouth daily. 90 tablet 3     methadone (DOLOPHINE) 10 mg/5 mL solution Take 45 mg by mouth every 12 (twelve) hours.       omeprazole  (PRILOSEC) 20 MG capsule TK 1 C PO D 1 HOUR AC 30 capsule 0     potassium chloride (K-DUR,KLOR-CON) 20 MEQ tablet TAKE 1 TABLET BY MOUTH EVERY 24 HOURS 90 tablet 0     sertraline (ZOLOFT) 100 MG tablet Take 1 tablet (100 mg total) by mouth at bedtime. 30 tablet 6     traMADol (ULTRAM) 50 mg tablet Take 1 tablet (50 mg total) by mouth 3 (three) times a day as needed for pain. 30 tablet 0     traMADol (ULTRAM) 50 mg tablet TAKE 1 TABLET(50 MG) BY MOUTH THREE TIMES DAILY AS NEEDED FOR PAIN 60 tablet 0     No current facility-administered medications for this visit.         Allergies  Hay fever and allergy relief and Penicillins     Social History  Social History     Social History     Marital status: Single     Spouse name: N/A     Number of children: N/A     Years of education: N/A     Occupational History     Not on file.     Social History Main Topics     Smoking status: Current Every Day Smoker     Packs/day: 0.50     Types: Cigarettes     Smokeless tobacco: Never Used     Alcohol use No      Comment: 9 months sober.     Drug use: No     Sexual activity: Not Currently     Other Topics Concern     Not on file     Social History Narrative    Single. No children. Under disability.         Family History  No family history on file.     Review of Systems:  Constitutional: Negative except as noted in HPI.   Eyes: Negative except as noted in HPI.   ENT: Negative except as noted in HPI.   Cardiovascular: Negative except as noted in HPI.   Respiratory: Negative except as noted in HPI.   Gastrointestinal: Negative except as noted in HPI.   Genitourinary: Negative except as noted in HPI.   Musculoskeletal: Negative except as noted in HPI.   Integumentary: Negative except as noted in HPI.   Neurological: Negative except as noted in HPI.   Psychiatric: Negative except as noted in HPI.   Endocrine: Negative except as noted in HPI.   Hematologic/Lymphatic: Negative except as noted in HPI.      STOP BANG 9/25/2018   Do you snore  "loudly (louder than talking or loud enough to be heard through closed doors)? 1   Do you often feel tired, fatigued, or sleepy during daytime? 1   Has anyone observed you stop breathing in your sleep? 0   Do you have or are you being treated for high blood pressure? 1   BMI more than 35 kg/m2 0   Age over 50 years old? 1   Neck circumference greater than 16 inches? 1   Gender male? 1   Total Score 6   Epworths Sleepiness Scale 9/25/2018   Sitting and reading 3   Watching TV 0   Sitting, inactive in a public place (e.g. a theatre or a meeting) 1   As a passenger in a car for an hour without a break 3   Lying down to rest in the afternoon when circumstances permit 3   Sitting and talking to someone 0   Sitting quietly after a lunch without alcohol 3   In a car, while stopped for a few minutes in traffic 1   Total score 14   Rooming 9/25/2018   Usual bedtime 11pm   Sleep Latency 15-20 minutes   Awakenings 3   Wake Up Time 7:30am   Weekends same   Energy Drinks y   Coffee 2-3 cups   Cola 0   Difficulty falling asleep No   Difficulty staying asleep Yes   Excessive daytime tiredness No   Excessive daytime sleepiness Yes   Dozing off while driving No   Shift Worker No   Sleep Walking? No   Sleep Talking? Yes   Kicking or punching? No   Restless legs symptoms No       Physical Exam:  /73 (Patient Site: Right Arm, Patient Position: Sitting, Cuff Size: Adult Large)  Pulse 86  Ht 5' 2\" (1.575 m)  Wt 190 lb 8 oz (86.4 kg)  SpO2 93%  BMI 34.84 kg/m2  BMI:Body mass index is 34.84 kg/(m^2).   GEN: NAD, obese  Head: Normocephalic.  EYES: PERRLA, EOMI  ENT: Oropharynx is clear, mallampatti class 4+ airway.   Nasal mucosa is moist without erythema  Neck : Thyroid is within normal limits. Neck circ 18\"  CV: Regular rate and rhythm, S1 & S2 positive.  LUNGS: Bilateral breathsounds heard.   ABDOMEN: Positive bowel sounds in all quadrants, soft, no rebound or guarding  MUSCULOSKELETAL: Bilateral trace leg swelling  SKIN: warm, " dry, no rashes  Neurological: Alert, oriented to time, place, and person.  Psych: normal mood, normal affect     Labs/Studies:     Lab Results   Component Value Date    WBC 7.7 06/28/2018    HGB 13.4 (L) 06/28/2018    HCT 41.8 06/28/2018    MCV 82 06/28/2018     06/28/2018         Chemistry        Component Value Date/Time     (L) 06/28/2018 1522    K 4.1 06/28/2018 1522    CL 96 (L) 06/28/2018 1522    CO2 31 06/28/2018 1522    BUN 16 06/28/2018 1522    CREATININE 1.15 06/28/2018 1522    GLU 89 06/28/2018 1522        Component Value Date/Time    CALCIUM 9.6 06/28/2018 1522    ALKPHOS 74 06/28/2018 1522    AST 23 06/28/2018 1522    ALT 13 06/28/2018 1522    BILITOT 0.5 06/28/2018 1522            No results found for: FERRITIN  Lab Results   Component Value Date    TSH 2.28 06/28/2018         Assessment and Plan:  In summary Oscar Mojica is a 58 y.o. year old male here for review of his sleep study.  1.  Obstructive sleep apnea  I educated the patient on the underlying pathophysiology of obstructive sleep apnea.  I then showed him results of his sleep study.  The patient would like to proceed with CPAP therapy as soon as possible.  Since the patient has no preference with regards to durable medical equipment company we will use [employer] as the durable medical equipment company.  2.  Hypersomnia  3.  Other sleep disturbance  4.  Obesity    Patient verbalized understanding of these issues, agrees with the plan and all questions were answered today. Patient was given an opportuntity to voice any other symptoms or concerns not listed above. Patient did not have any other symptoms or concerns.      Follow-up in 8 weeks.     Antonio Israel DO  Board Certified in Internal Medicine and Sleep Medicine  [facility].    (Note created with Dragon voice recognition and unintended spelling errors and word substitutions may occur)

## 2021-06-20 NOTE — LETTER
Letter by Chele Hodge MD at      Author: Chele Hodge MD Service: -- Author Type: --    Filed:  Encounter Date: 10/13/2020 Status: (Other)         October 13, 2020     Patient: Oscar Mojica   YOB: 1960   Date of Visit: 10/13/2020       To Whom It May Concern:    It is my medical opinion that Oscar Mojica needs a  for 5 hours  and personal care attendant for 5 hours  5 days a week due to his multiple medical problems.    If you have any questions or concerns, please don't hesitate to call.    Sincerely,        Electronically signed by Chele Hodge MD

## 2021-06-20 NOTE — LETTER
Letter by Chele Hodge MD at      Author: Chele Hodge MD Service: -- Author Type: --    Filed:  Encounter Date: 6/29/2020 Status: (Other)         Oscar Mojica  [address]  [place] [address]             June 29, 2020         Dear Mr. Mojica,    Below are the results from your recent visit:    Resulted Orders   HM2(CBC w/o Differential)   Result Value Ref Range    WBC 6.2 4.0 - 11.0 thou/uL    RBC 5.30 4.40 - 6.20 mill/uL    Hemoglobin 14.0 14.0 - 18.0 g/dL    Hematocrit 41.8 40.0 - 54.0 %    MCV 79 (L) 80 - 100 fL    MCH 26.5 (L) 27.0 - 34.0 pg    MCHC 33.5 32.0 - 36.0 g/dL    RDW 11.9 11.0 - 14.5 %    Platelets 297 140 - 440 thou/uL    MPV 7.2 7.0 - 10.0 fL   Lipid Cascade   Result Value Ref Range    Cholesterol 201 (H) <=199 mg/dL    Triglycerides 123 <=149 mg/dL    HDL Cholesterol 61 >=40 mg/dL    LDL Calculated 115 <=129 mg/dL    Patient Fasting > 8hrs? Yes    Glycosylated Hemoglobin A1c   Result Value Ref Range    Hemoglobin A1c 6.1 (H) 3.5 - 6.0 %   Basic Metabolic Panel   Result Value Ref Range    Sodium 136 136 - 145 mmol/L    Potassium 3.8 3.5 - 5.0 mmol/L    Chloride 94 (L) 98 - 107 mmol/L    CO2 30 22 - 31 mmol/L    Anion Gap, Calculation 12 5 - 18 mmol/L    Glucose 90 70 - 125 mg/dL    Calcium 9.4 8.5 - 10.5 mg/dL    BUN 18 8 - 22 mg/dL    Creatinine 1.16 0.70 - 1.30 mg/dL    GFR MDRD Af Amer >60 >60 mL/min/1.73m2    GFR MDRD Non Af Amer >60 >60 mL/min/1.73m2    Narrative    Fasting Glucose reference range is 70-99 mg/dL per  American Diabetes Association (ADA) guidelines.   Hepatic Profile   Result Value Ref Range    Bilirubin, Total 0.3 0.0 - 1.0 mg/dL    Bilirubin, Direct 0.1 <=0.5 mg/dL    Protein, Total 7.4 6.0 - 8.0 g/dL    Albumin 4.0 3.5 - 5.0 g/dL    Alkaline Phosphatase 67 45 - 120 U/L    AST 21 0 - 40 U/L    ALT 14 0 - 45 U/L       Improved lipids and now good diabetes control. Kidney function is back to normal. Normal all other labs with some insignificant changes  of no importance.     Continue same meds. Thanks.     Please call with questions or contact us using [employer].    Sincerely,        Electronically signed by Chele Hodge MD

## 2021-06-21 NOTE — LETTER
Letter by Chele Hodge MD at      Author: Chele Hodge MD Service: -- Author Type: --    Filed:  Encounter Date: 2/3/2021 Status: (Other)         Oscar Mojica  [address]  [place] [address]             February 3, 2021         Dear Mr. Mojica,    Below are the results from your recent visit:    Resulted Orders   HM2(CBC w/o Differential)   Result Value Ref Range    WBC 6.8 4.0 - 11.0 thou/uL    RBC 4.91 4.40 - 6.20 mill/uL    Hemoglobin 12.3 (L) 14.0 - 18.0 g/dL    Hematocrit 37.9 (L) 40.0 - 54.0 %    MCV 77 (L) 80 - 100 fL    MCH 25.1 (L) 27.0 - 34.0 pg    MCHC 32.5 32.0 - 36.0 g/dL    RDW 12.7 11.0 - 14.5 %    Platelets 290 140 - 440 thou/uL    MPV 9.6 7.0 - 10.0 fL   Lipid Cascade   Result Value Ref Range    Cholesterol 188 <=199 mg/dL    Triglycerides 151 (H) <=149 mg/dL    HDL Cholesterol 36 (L) >=40 mg/dL    LDL Calculated 122 <=129 mg/dL    Patient Fasting > 8hrs? Yes    Glycosylated Hemoglobin A1c   Result Value Ref Range    Hemoglobin A1c 6.9 (H) <=5.6 %   Thyroid Stimulating Hormone (TSH)   Result Value Ref Range    TSH 2.65 0.30 - 5.00 uIU/mL   Urinalysis-UC if Indicated   Result Value Ref Range    Color, UA Yellow Colorless, Yellow, Straw, Light Yellow    Clarity, UA Clear Clear    Glucose, UA Negative Negative    Bilirubin, UA Negative Negative    Ketones, UA Negative Negative    Specific Gravity, UA 1.025 1.005 - 1.030    Blood, UA Negative Negative    pH, UA 7.0 5.0 - 8.0    Protein, UA Negative Negative mg/dL    Urobilinogen, UA 0.2 E.U./dL 0.2 E.U./dL, 1.0 E.U./dL    Nitrite, UA Negative Negative    Leukocytes, UA Negative Negative    Narrative    Microscopic not indicated  UC not indicated   Basic Metabolic Panel   Result Value Ref Range    Sodium 136 136 - 145 mmol/L    Potassium 4.4 3.5 - 5.0 mmol/L    Chloride 97 (L) 98 - 107 mmol/L    CO2 31 22 - 31 mmol/L    Anion Gap, Calculation 8 5 - 18 mmol/L    Glucose 102 70 - 125 mg/dL    Calcium 9.4 8.5 - 10.5 mg/dL    BUN  16 8 - 22 mg/dL    Creatinine 1.24 0.70 - 1.30 mg/dL    GFR MDRD Af Amer >60 >60 mL/min/1.73m2    GFR MDRD Non Af Amer 60 (L) >60 mL/min/1.73m2    Narrative    Fasting Glucose reference range is 70-99 mg/dL per  American Diabetes Association (ADA) guidelines.   Hepatic Profile   Result Value Ref Range    Bilirubin, Total 0.3 0.0 - 1.0 mg/dL    Bilirubin, Direct 0.1 <=0.5 mg/dL    Protein, Total 7.2 6.0 - 8.0 g/dL    Albumin 4.0 3.5 - 5.0 g/dL    Alkaline Phosphatase 47 45 - 120 U/L    AST 22 0 - 40 U/L    ALT 14 0 - 45 U/L   Microalbumin, Random Urine   Result Value Ref Range    Microalbumin, Random Urine 0.59 0.00 - 1.99 mg/dL    Creatinine, Urine 147.1 mg/dL    Microalbumin/Creatinine Ratio Random Urine 4.0 <=19.9 mg/g    Narrative    Microalbumin, Random Urine  <2.0 mg/dL . . . . . . . . Normal  3.0-30.0 mg/dL . . . . . . Microalbuminuria  >30.0 mg/dL . . . . . .  . Clinical Proteinuria    Microalbumin/Creatinine Ratio, Random Urine  <20 mg/g . . . . .. . . . Normal   mg/g . . . . . . . Microalbuminuria  >300 mg/g . . . . . . . . Clinical Proteinuria           Good diabetes control. No protein spillage in the urine due to your diabetes. Normal lipids. Normal all other labs. Some insignificant changes of no clinical importance.     Good labs! Continue same medications. Thanks.     Please call with questions or contact us using [employer].    Sincerely,        Electronically signed by Chele Hodge MD

## 2021-06-21 NOTE — LETTER
Letter by Merry Mora SW at      Author: Merry Mora SW Service: -- Author Type: --    Filed:  Encounter Date: 1/28/2021 Status: (Other)       CARE COORDINATION  [facility]    January 28, 2021    Oscar Mojica  [address] [facility] [address]  [place] [address]      Dear Oscar,  I have been unsuccessful in reaching you since our last contact. At this time the Care Coordination team will make no further attempts to reach you, however this does not change your ability to continue receiving care from your providers at your primary care clinic. If you need additional support from a care coordinator in the future please contact Merry at [phone].    [facility] are invested in your health and are here to assist you in meeting your goals.     Sincerely,    Merry Mora, [facility] Care Coordinator  [phone]

## 2021-06-21 NOTE — PROGRESS NOTES
Non-surgical Weight Loss Follow Up Diet Evaluation    Assessment:  This patient is a 58 y.o. male is being seen today for follow-up non-surgical nutritional evaluation. Today we reviewed the patients current eating habits and level of physical activity, and instructed on the changes that are required for successful weight loss outcomes.    Pt on phone during visit, ROBERTO CARLOS limited.     Pt's No Data Recorded  BMI: There is no height or weight on file to calculate BMI.  IBW: 116 lbs    Personal goal weight: 150-160 lb      Pt Active Problem List Diagnosis:     Patient Active Problem List   Diagnosis     Major depression     Essential hypertension     Left knee pain     Back pain       Estimated RMR (Starr-St Jeor equation): 1711 calories  Protein requirements (.5grams to .9grams per pound IBW, 20-30% of calories, minimum of 60-80gm per day):  55-99 grams    Progress made since last visit: Pt reports cutting back on chocolate and trying to replace with hard candy. Pt now trying to include a vegetable at a dinner meal and trying to increase fruit intake. Pt notes still have strong sweet tooth for chocolate and pastries.     Concerns: Large portion sizes, skipping meals, high sugar intake.     Diet Recall/Time:   Pt reports waking up at 3-4am to eat 2 bowls of cereal.   Breakfast: 10:00am-3 eggs, 5 slices, 2 slices of bread with jam (30g)   Am Snack: none   Lunch:  Skip   Pm snack:none   Dinner:  2 hot dogs on buns, potato chips (20g)   HS Snack: cereal     Protein: 50 grams    Meals per week away from home: none      Recommended limiting eating out to no more than 2x/week.  Patient and I reviewed the importance of eating three consistent meals per day; as well as meal timing to be spaced 4-5 hours apart.  Snack choices: 100-150 calories (1-2x/day if physically hungry), incorporating a fruit/vegetable w/ protein source.    Meal Duration: not discussed    Portion Sizes problematic? YES per patient/diet recall  Encouraged  slowing meal times down, 20-30 minutes, chewing to applesauce consistency.   To aid in proper portion control and slow meal time down discussed consuming meals off smaller plates, use toddler/children utensils and set utensils down after each bite.    Protein, vegetables/fruits, carbohydrates:   The patient and I discussed the importance of including lean/low fat protein at each meal and limiting carbohydrate intake to less than 25% of plate volume.       Beverages (Type/Oz. per day)  Water: 40 oz   Coffee: 2-3 mugs   Tea: none   Milk: in cereal (2%)   Regular soda: Root beer, sprite. 1 can/day   Diet soda: none   Juice: apple juice, fruit punch   Álvaro-Aid/lemonade/etc: none.   Alcohol: Not discussed     Discussed the importance of adequate hydration and the goal of 64+ oz of fluid daily.   The patient understands the importance of  avoiding all sweetened and alcoholic drinks, and instead choosing 64 oz plain water.    Exercise  Yoga at chiropractor 3x/week for 30 minutes.        Pt's understands that 45-60 minutes of daily activity is an important part of weight loss success.   Encouraged pt to incorporate  strength training exercise in addition to cardiovascular exercise most days of the week.    PES statement:     1. (NI-1.3)Excessive energy intake related to Not ready for diet/lifestyle change as evidenced by Intake of high caloric density foods/beverages (juice, soda) at meals and/or snacks; large portion; Estimated intake that exceeds estimated daily energy intake; Binge eating patterns; Frequent excessive fast food or restaurant intake; and BMI 40.         Intervention:  Discussion:  1. Educated pt on importance of consistency with healthy eating habits.   2. Reviewed food groups and portion sizes.  3. Discussed negative effects juice and soda have toward weight loss.  4. Discussed negative effects of chocolate and sweet intake.   5. Plate Method: The patient and I discussed the importance of including  lean/low  fat protein at each meal and limiting carbohydrate intake to less  than 25% of plate volume.  Instructions/Goals:   1. Include  protein at each meal.  2. Increase vegetable/fruit intake, by having a vegetable or fruit with each meal daily. Recommended pt to increase vegetable/fruit intake to 4-5 servings daily.  3. Increase fluid intake to 64oz daily: choose plain or calorie/alcohol-free beverages.  4. Incorporate daily structured activity, 45-60 minutes most days of the week  5. Practice plate method: 1/2 plate lean/low fat protein source, vegetable/fruit, <25% of plate complex carbohydrates.  6. Carbohydrates from grain sources at meal times to be no more than 1 Carb Choice, ie: 15-20 gm total carbohydrate per serving  7. Practice eating off of smaller plates/bowls, chewing to applesauce consistency, taking 20-30 minutes to eat in a calm/relaxed environment without distractions of tv/email/cell phone.    Goals set by patient:  1. Increase water intake, cut out all soda and juice   2. Try Crystal Light   3. Limit cereal to 1 bowl of day.     Handouts Provided:  Food groups  Goal sheet     Monitor/Evaluation:    Pt will f/u in one month with RD for weight management.     Plan for next visit with RD:  Pt desires 15 lb weight loss over next 2 months.   Review plate method and food groups  Educate on food labels  Exercise      Time In: 2:30pm  Time Out: 3:00pm      ABN signed: Yes

## 2021-06-21 NOTE — PROGRESS NOTES
Office Visit - Follow Up   Oscar Mojica   58 y.o. male    Date of Visit: 11/21/2018    Chief Complaint   Patient presents with     Follow-up     complains of increased intense back pain, has not taken Tramadol as he has been out. Taking extra strength tylenol and it takes the edge off the pain, he tells me Tramadol works better        Assessment and Plan   1. Essential hypertension  Controlled.  Continue hydrochlorothiazide.    2. Current moderate episode of major depressive disorder without prior episode (H)  Now in remission.  Was to get a statement from me that he can handle his finances.  Will write a letter about this fact.  Advised to continue to follow with his psychiatrist.  In the meantime continue all his psych meds.    3. Chronic back pain  Has chronic back pains.  Was given one-time prescription of tramadol.  Helped his back pains but he cannot take his long-term because he takes methadone for  treatment of his chemical dependency.  Discussed at length this with the patient.  Discontinue prescribing tramadol.  Advised to continue taking Tylenol.  Informed  I will refer him to  spine care clinic which can treat his back pains with all available modalities.  He agrees and will refer him to the spine care clinic.  - Ambulatory referral to Spine Care        Follow up in 4 months.     History of Present Illness   This 58 y.o. old male is here for follow-up.  Has chronic back pains.  Was seen for his chronic back pains and wanted to have tramadol because it worked in the past.  Was given one-time prescription of tramadol.  But he could not continue to get this because he is under methadone treatment for his history of chemical dependency.  Has depression.  But now in remission.  Reports he can handle his finances now.  Wants to start handling his finances again.  Apparently his sister is doing it for him.  Now he needs a letter from me about this fact.  Overall, feels well.  No complaints except for his  chronic back pains.    Review of Systems   A 12 point comprehensive review of systems was negative except as noted..     Medications, Allergies and Problem List   Reviewed and updated             Chief Complaint   Follow-up (complains of increased intense back pain, has not taken Tramadol as he has been out. Taking extra strength tylenol and it takes the edge off the pain, he tells me Tramadol works better)       Patient Profile   Social History     Social History Narrative    Single. No children. Under disability.         Past Medical History   Patient Active Problem List   Diagnosis     Major depression     Essential hypertension     Left knee pain     Back pain       Past Surgical History  He has no past surgical history on file.       Medications and Allergies   Current Outpatient Medications   Medication Sig     carboxymethylcellulose sodium 0.5 % Drop 2 drops 3 times daily for dry eyes.     cholecalciferol, vitamin D3, (VITAMIN D3) 5,000 unit Tab Once daily     clotrimazole (LOTRIMIN) 1 % cream APPLY EXTERNALLY TO THE AFFECTED AREA TWICE DAILY     diclofenac sodium (VOLTAREN) 1 % Gel APPLY 2 GRAMS TOPICALLY QID.     hydroCHLOROthiazide (HYDRODIURIL) 25 MG tablet Take 25 mg by mouth daily.     meloxicam (MOBIC) 15 MG tablet Take 1 tablet (15 mg total) by mouth daily.     methadone (DOLOPHINE) 10 mg/5 mL solution Take 45 mg by mouth every 12 (twelve) hours.     omeprazole (PRILOSEC) 20 MG capsule TAKE 1 CAPSULE BY MOUTH EVERY DAY 1 HOUR BEFORE A MEAL     potassium chloride (K-DUR,KLOR-CON) 20 MEQ tablet TAKE 1 TABLET BY MOUTH EVERY 24 HOURS     sertraline (ZOLOFT) 100 MG tablet Take 1 tablet (100 mg total) by mouth at bedtime.     Allergies   Allergen Reactions     Hay Fever And Allergy Relief      Penicillins Nausea Only        Family and Social History   No family history on file.     Social History     Tobacco Use     Smoking status: Current Every Day Smoker     Packs/day: 0.50     Types: Cigarettes      "Smokeless tobacco: Never Used   Substance Use Topics     Alcohol use: No     Comment: 9 months sober.     Drug use: No        Physical Exam       Physical Exam  /76   Pulse 78   Ht 5' 2\" (1.575 m)   Wt 221 lb (100.2 kg)   SpO2 97%   BMI 40.42 kg/m    General appearance: alert, appears stated age, cooperative and no distress  Head: Normocephalic, without obvious abnormality, atraumatic  Throat: lips, mucosa, and tongue normal; teeth and gums normal  Neck: no adenopathy, no carotid bruit, no JVD, supple, symmetrical, trachea midline and thyroid not enlarged, symmetric, no tenderness/mass/nodules  Lungs: clear to auscultation bilaterally  Heart: regular rate and rhythm, S1, S2 normal, no murmur, click, rub or gallop  Abdomen: soft, non-tender; bowel sounds normal; no masses,  no organomegaly  Extremities: extremities normal, atraumatic, no cyanosis or edema  Skin: Skin color, texture, turgor normal. No rashes or lesions  Neurologic: Grossly normal  Musculoskeletal: Tender low back with some limitation of range of motion     Additional Information        Chele Hodge MD  Internal Medicine  Contact me at [phone]     Additional Information   Current Outpatient Medications   Medication Sig     carboxymethylcellulose sodium 0.5 % Drop 2 drops 3 times daily for dry eyes.     cholecalciferol, vitamin D3, (VITAMIN D3) 5,000 unit Tab Once daily     clotrimazole (LOTRIMIN) 1 % cream APPLY EXTERNALLY TO THE AFFECTED AREA TWICE DAILY     diclofenac sodium (VOLTAREN) 1 % Gel APPLY 2 GRAMS TOPICALLY QID.     hydroCHLOROthiazide (HYDRODIURIL) 25 MG tablet Take 25 mg by mouth daily.     meloxicam (MOBIC) 15 MG tablet Take 1 tablet (15 mg total) by mouth daily.     methadone (DOLOPHINE) 10 mg/5 mL solution Take 45 mg by mouth every 12 (twelve) hours.     omeprazole (PRILOSEC) 20 MG capsule TAKE 1 CAPSULE BY MOUTH EVERY DAY 1 HOUR BEFORE A MEAL     potassium chloride (K-DUR,KLOR-CON) 20 MEQ tablet TAKE 1 TABLET BY " MOUTH EVERY 24 HOURS     sertraline (ZOLOFT) 100 MG tablet Take 1 tablet (100 mg total) by mouth at bedtime.     Allergies   Allergen Reactions     Hay Fever And Allergy Relief      Penicillins Nausea Only     Social History     Tobacco Use     Smoking status: Current Every Day Smoker     Packs/day: 0.50     Types: Cigarettes     Smokeless tobacco: Never Used   Substance Use Topics     Alcohol use: No     Comment: 9 months sober.     Drug use: No         Time: total time spent with the patient was 25 minutes of which >50% was spent in counseling and coordination of care

## 2021-06-21 NOTE — LETTER
Letter by Francisca Rollins CHW at      Author: Francisca Rollins CHW Service: -- Author Type: --    Filed:  Encounter Date: 1/20/2021 Status: (Other)         January 21, 2021            Oscar Mojica  [address]  [place] [address]        Dear Oscar,                                                                                                                               You enrolled with the [facility] Care Coordination Services.  In order for us to provide guidance we need to connect on a monthly bases.  We have tried calling you 2 times in the last month and have been unsuccessful in reaching you. Please call me at [phone] at your earliest convenience.  If you reach my voicemail, please leave a message with your daytime telephone number and a date and time that I can return your call.                                                                            Sincerely,        NIDHI Espinosa                                                                     Clinic Care Coordination                                          [facility]

## 2021-06-21 NOTE — PROGRESS NOTES
/21/18      Oscar Mojica  [address]  [place] [address]    Dear Oscar,    This letter is being sent to you to let you know that I can no longer continue to assume responsibility as your psychiatry provider. You were intially seen in May of 2018 and did not return for care after that date.  Today it has been brought to my attention that you did not attend your appointment.  Therefore effective from the date of this letter, I will consider our provider -patient relationship terminated.      I strongly recommend that you continue to receive healthcare and that you contact your insurance for available approved psychiatry providers to continue medication management   if you do not have clinician in mind.   My office will send copies of our medical records regarding the care we have provided for you to another physician or health care organization once we receive a written request from you to do so.     Sincerely       Le Junior  [facility]

## 2021-06-22 NOTE — PROGRESS NOTES
ASSESSMENT: Oscar Mojica is a 58 y.o. male who presents for consultation at the request of HE PCP Chele Hodge MD, with a past medical history significant for major depression, hypertension, left knee pain, back pain, methadone ongoing for chemical dependency, who presents today for new patient evaluation of:    -Chronic bilateral low back pain worse with standing and walking without significant stenosis noted on MRI.  He does have facet arthropathy noted on MRI but no increased pain with facet loading and had a failed response with medial branch block and facet joint steroid injection with [facility].    Patient is neurologically intact on exam. No myelopathic or red flag symptoms.    LUCIANO Score: 48    WHO 5: 9     PHQ-2: 2    Diagnoses and all orders for this visit:    Chronic bilateral low back pain without sciatica  -     Ambulatory referral to PT/OT    Lumbar facet arthropathy  -     Ambulatory referral to PT/OT    DDD (degenerative disc disease), lumbar  -     Ambulatory referral to PT/OT    Deconditioned low back  -     Ambulatory referral to PT/OT    Hamstring tightness of both lower extremities  -     Ambulatory referral to PT/OT      PLAN:  Reviewed spine anatomy and disease process. Discussed diagnosis and treatment options with the patient today. A shared decision making model was used.  The patient's values and choices were respected. The following represents what was discussed and decided upon by the provider and the patient.      -DIAGNOSTIC TESTS:  Images were personally reviewed and interpreted and explained to patient today using spine model.   --Lumbar spine MRI [facility] 4/19/2018 does show multilevel degenerative changes with levocurvature lumbar spine.  Chronic foraminal stenosis multilevel that is mild to moderate on the right at L5-S1, mild to moderate on the right and mild left foraminal stenosis at L4-5, and mild bilateral foraminal stenosis at L3-4.  Multilevel facet arthrosis as  well.    -PHYSICAL THERAPY: Referral sent to [facility] physical therapy for pool and land therapy to target his chronic low back pain and to help with weight loss.  Discussed the importance of core strengthening, ROM, stretching exercises with the patient and how each of these entities is important in decreasing pain.  Explained to the patient that the purpose of physical therapy is to teach the patient a home exercise program.  These exercises need to be performed every day in order to decrease pain and prevent future occurrences of pain.        -MEDICATIONS: Patient had trialed gabapentin in the past with side effects with leg swelling he states.  -Otherwise continue methadone at this time and meloxicam as needed.  Discussed multiple medication options today with patient. Discussed risks, side effects, and proper use of medications. Patient verbalized understanding.    -Referral: Consider referral to [facility] pain clinic to discuss other medication management options down the road, he wants to trial physical therapy first however.  He is being managed with his methadone at a methadone clinic and is trying to wean slowly off of it he states.    -INTERVENTIONS: No further recommendations for injections.  He unfortunately failed medial branch block as well as lumbar facet joint steroid injections with [facility], no significant spinal canal stenosis to target and he has no current radicular pain.  Patient is also not interested in further injections today after discussing this option.  Discussed risks and benefits of injections with patient today.  I do not feel spinal cord stimulator would be substantially effective for him as he does not have any significant spinal cord or foraminal stenosis but this could be a consideration down the road if his pain is not improving.    -PATIENT EDUCATION:  45 minutes of total visit time was spent face to face with the patient today, greater than 50% of total time spent  with patient was spent on counseling, education, and coordinating care.   -10 minutes spent outside of visit time, non-face-to-face time, reviewing chart.    -FOLLOW-UP:   Follow-up as needed.    Advised patient to call the [facility] if symptoms worsen or you have problems controlling bladder and bowel function.   ______________________________________________________________________    SUBJECTIVE:  HPI:  Oscar Mojica  Is a 58 y.o. male who presents today for new patient evaluation of low back pain that he reports is chronic in nature for many years that is currently a 0/10 with sitting but gets up to an 8/10 with standing for more than 15 minutes or walking a couple of blocks, he reports that when he sits down his pain does go away completely.  His pain is most bothersome at the lumbosacral junction radiating to the upper buttock, he denies any radicular leg pain, denies weakness or recent trips or falls, denies numbness or tingling sensations.      -Treatment to Date: No prior spinal surgery.    Failed bilateral L2, L3, L4, L5 medial branch block  as well as failed bilateral L3-4, L4-5, L5-S1 facet joint steroid injections [facility] April 2018.    -Medications:  Voltaren gel  Tylenol  Meloxicam  Tramadol  **Methadone for treatment of chemical dependency    Current Outpatient Medications on File Prior to Encounter   Medication Sig Dispense Refill     carboxymethylcellulose sodium 0.5 % Drop 2 drops 3 times daily for dry eyes. 30 mL 3     cholecalciferol, vitamin D3, (VITAMIN D3) 5,000 unit Tab Once daily 90 each 3     clotrimazole (LOTRIMIN) 1 % cream APPLY EXTERNALLY TO THE AFFECTED AREA TWICE DAILY 30 g 0     diclofenac sodium (VOLTAREN) 1 % Gel APPLY 2 GRAMS TOPICALLY QID.  6     hydroCHLOROthiazide (HYDRODIURIL) 25 MG tablet Take 25 mg by mouth daily.       meloxicam (MOBIC) 15 MG tablet Take 1 tablet (15 mg total) by mouth daily. 90 tablet 3     methadone (DOLOPHINE) 10 mg/5 mL solution Take 45 mg  by mouth every 12 (twelve) hours.       omeprazole (PRILOSEC) 20 MG capsule TAKE 1 CAPSULE BY MOUTH EVERY DAY 1 HOUR BEFORE A MEAL 90 capsule 2     potassium chloride (K-DUR,KLOR-CON) 20 MEQ tablet TAKE 1 TABLET BY MOUTH EVERY 24 HOURS 90 tablet 2     sertraline (ZOLOFT) 100 MG tablet Take 1 tablet (100 mg total) by mouth at bedtime. 30 tablet 6     No current facility-administered medications on file prior to encounter.        Allergies   Allergen Reactions     Hay Fever And Allergy Relief      Penicillins Nausea Only       Past Medical History:   Diagnosis Date     Alcoholism (H)      Anxiety      Arthritis      Depression      Hepatitis C     s/p treatment for 90 days.     Hypertension      Substance abuse (H)     hx of alcohol, cocain, heroin        Patient Active Problem List   Diagnosis     Major depression     Essential hypertension     Left knee pain     Back pain       No past surgical history on file.    No family history on file.    Reviewed past medical, surgical, and family history with patient found on new patient intake packet located in [employer]MR [employer] tab.     SOCIAL HX: Patient is single.  Patient does report current tobacco smoking as well as a history of crack cocaine and heroin snorting, he is currently on methadone for this.    ROS: Positive for indigestion, back pain, joint pain, headache, depression/worry/anxiety, heat/cold intolerance, constipation, cough, dizziness.  Specifically negative for bowel/bladder dysfunction, balance changes, headache, dizziness, foot drop, fevers, chills, appetite changes, nausea/vomiting, unexplained weight loss. Otherwise 13 systems reviewed are negative. Please see the patient's intake questionnaire from today for details.    OBJECTIVE:  /72 (Patient Site: Right Arm, Patient Position: Sitting)   Pulse 74   Temp 97.4  F (36.3  C) (Oral)   SpO2 94%     PHYSICAL EXAMINATION:    --CONSTITUTIONAL:  Vital signs as above.  No acute distress.  The patient is obese,  well groomed.  --PSYCHIATRIC:  Appropriate mood and affect. The patient is awake, alert, oriented to person, place, time and answering questions appropriately with clear speech.    --SKIN:  Skin over the face, bilateral lower extremities, and posterior torso is clean, dry, intact without rashes.    --RESPIRATORY: Normal rhythm and effort. No abnormal accessory muscle breathing patterns noted.   --STANDING EXAMINATION:  Normal lumbar lordosis noted, no lateral shift.  --MUSCULOSKELETAL: Lumbar spine inspection reveals no evidence of deformity. Range of motion is not limited in lumbar flexion, extension, lateral rotation. No tenderness to palpation lumbar spine. Straight leg raising in the supine position is negative to radicular pain. Sciatic notch non-tender.  --SACROILIAC JOINT: Negative distraction.  Negative Lindsay's with reproduction of pain to affected extremity.  Negative Gaenslen's Test with reproduction of pain to affected extremity. One Finger point test negative.  --GROSS MOTOR: Gait is non-antalgic. Easily arises from a seated position. Toe walking and heel walking are normal without significant difficulty.    --LOWER EXTREMITY MOTOR TESTING:  Plantar flexion left 5/5, right 5/5   Dorsiflexion left 5/5, right 5/5   Great toe MTP extension left 5/5, right 5/5  Knee flexion left 5/5, right 5/5  Knee extension left 5/5, right 5/5   Hip flexion left 5/5, right 5/5  Hip abduction left 5/5, right 5/5  Hip adduction left 5/5, right 5/5   --HIPS: Full range of motion bilaterally. Negative FABERs on the involved lower extremity.   --NEUROLOGICAL:  2/4 patellar, medial hamstring, and achilles reflexes bilaterally.  Sensation to light touch is intact in the bilateral L4, L5, and S1 dermatomes. Babinski is negative. No clonus.  Negative Pati reflex bilaterally.  --VASCULAR:  2/4 dorsalis pedis and posterior tibialsi pulses bilaterally.  Bilateral lower extremities are warm.  There is no pitting edema of the  bilateral lower extremities.    RESULTS: Prior medical records from [facility] 8/20/2018 to current, care everywhere and [facility] were reviewed today.    Imaging: Lumbar spine Imaging was personally reviewed and interpreted today. The images were shown to the patient and the findings were explained using a spine model.      Lumbar spine MRI without contrast  [facility]- 4/19/2018  Conclusion: Multilevel degenerative thoracal lumbar spondylosis, generalized levocurvature of the lumbar spine and the following notable findings:  1.  No disc herniation, significant central stenosis, or acute fracture or spondylolysis.  2.  Multilevel chronic foraminal narrowing that varies from mild to moderate without ganglionic compression of the above.  3.  Mild right L5-S1, mild to moderate right L4-5 and mild right L3-4 facet arthrosis.

## 2021-06-22 NOTE — PROGRESS NOTES
Non-surgical Weight Loss Follow Up Diet Evaluation    Assessment:  This patient is a 58 y.o. male is being seen today for follow-up non-surgical nutritional evaluation. Today we reviewed the patients current eating habits and level of physical activity, and instructed on the changes that are required for successful weight loss outcomes.    Pt's Initial Weight: 214 lbs  Weight: 217 lb (98.4 kg)  Weight loss from initial: -3  % Weight loss: -1.4 %    BMI: Body mass index is 39.69 kg/m .  IBW: 116 lbs    Personal goal weight: 150-160 lb     Pt Active Problem List Diagnosis:     Patient Active Problem List   Diagnosis     Major depression     Essential hypertension     Left knee pain     Back pain       Estimated RMR (Kershaw-St Jeor equation): 1711 calories  Protein requirements (.5grams to .9grams per pound IBW, 20-30% of calories, minimum of 60-80gm per day): 55-99 grams    Progress made since last visit: Pt reports having binge-like episodes with candies and chocolate. Pt still drinking soda and juice, though reports increasing his water intake. Still eating two bowls of cereal per day.     Concerns: Little to no changes in dietary habits since last visit.       Diet Recall/Time:   Pt wakes up in early morning (3-4am) for bowl of cereal   Breakfast: cheeseburger and potato chips, cup coffee (20g)   Am Snack: none   Lunch: skips  Pm snack:none   Dinner: potato chips, ice cream, granola bars, (5-10g)   HS Snack: none   Pt reports waking up in the middle of the night for a candy bar     Protein: 30 grams    Meals per week away from home: 0-1x/month      Recommended limiting eating out to no more than 2x/week.  Patient and I reviewed the importance of eating three consistent meals per day; as well as meal timing to be spaced 4-5 hours apart.  Snack choices: 100-150 calories (1-2x/day if physically hungry), incorporating a fruit/vegetable w/ protein source.    Meal Duration: not discussed    Portion Sizes problematic?  YES per patient/diet recall  Encouraged slowing meal times down, 20-30 minutes, chewing to applesauce consistency.   To aid in proper portion control and slow meal time down discussed consuming meals off smaller plates, use toddler/children utensils and set utensils down after each bite.    Protein, vegetables/fruits, carbohydrates:   The patient and I discussed the importance of including lean/low fat protein at each meal and limiting carbohydrate intake to less than 25% of plate volume.       Beverages (Type/Oz. per day)  Water: 40 oz   Coffee: 1- cups   Tea: none   Milk: 2%   Regular soda: 1 can of soda   Diet soda: none   Juice: none   Álvaro-Aid/lemonade/etc: none   Alcohol: none     Discussed the importance of adequate hydration and the goal of 64+ oz of fluid daily.   The patient understands the importance of  avoiding all sweetened and alcoholic drinks, and instead choosing 64 oz plain water.    Exercise  Yoga 2-3x/week (at chiropractor )     Pt's understands that 45-60 minutes of daily activity is an important part of weight loss success.   Encouraged pt to incorporate  strength training exercise in addition to cardiovascular exercise most days of the week.    PES statement:     1. (NI-1.3)Excessive energy intake related to Not ready for diet/lifestyle change as evidenced by Intake of high caloric density foods/beverages (soda) at meals and/or snacks; large portion; Estimated intake that exceeds estimated daily energy intake; Binge eating patterns; Frequent excessive fast food or restaurant intake; and BMI 39..          Intervention:  Discussion:  1. Reviewed food groups and sources   2. Reviewed lean protein sources.  3. Carbohydrate Countin carbohydrate choice/serving = 15-20gm total carbohydrate   4. Wrote out custom meal plan following plate method   5. Plate Method: The patient and I discussed the importance of including lean/low  fat protein at each meal and limiting carbohydrate intake to  less  than 25% of plate volume.    Instructions/Goals:   1. Include 20-30 gm protein at each meal.  2. Increase vegetable/fruit intake, by having a vegetable or fruit with each meal daily. Recommended pt to increase vegetable/fruit intake to 4-5 servings daily.  3. Increase fluid intake to 64oz daily: choose plain or calorie/alcohol-free beverages.  4. Incorporate daily structured activity, 45-60 minutes most days of the week  5. Practice plate method: 1/2 plate lean/low fat protein source, vegetable/fruit, <25% of plate complex carbohydrates.  6. Carbohydrates from grain sources at meal times to be no more than 1 Carb Choice, ie: 15-20 gm total carbohydrate per serving  7. Practice eating off of smaller plates/bowls, chewing to applesauce consistency, taking 20-30 minutes to eat in a calm/relaxed environment without distractions of tv/email/cell phone.    Handouts Provided:  Plate method  Custom menu     Monitor/Evaluation:    Pt will f/u in one month with bariatrician, and f/u in two months with RD.    Plan for next visit with RD:    Review plate method and menu   Educate on portion sizes   Exercise      Time In: 1:30pm  Time Out:  2:00pm      ABN signed: Yes

## 2021-06-22 NOTE — PROGRESS NOTES
Dear Dr. Chele Hodge MD  [address] [place] [address],    Thank you for the opportunity to participate in the care of Oscar Mojica.     He is a 58 y.o. y/o male patient who comes to the sleep medicine clinic for follow up.  This is the patient's first clinical visit since starting CPAP therapy.  Patient states that his sleep quality has improved when he is able to use his CPAP machine consistently.  He does complain that the current pressure setting is a bit too high and was wondering if I can lower the pressure settings.    Compliance Download data for 30 days:  Pressure setting: APAP 11-14 CWP  Residual AHI: 1.2 events per hour  Leak: Minimal  Compliance: 57%  Mask Tolerance: Good  Skin irritation: None      Past Medical History:   Diagnosis Date     Alcoholism (H)      Anxiety      Arthritis      Depression      Hepatitis C     s/p treatment for 90 days.     Hypertension      Substance abuse (H)     hx of alcohol, cocain, heroin       No past surgical history on file.    Social History     Socioeconomic History     Marital status: Single     Spouse name: Not on file     Number of children: Not on file     Years of education: Not on file     Highest education level: Not on file   Social Needs     Financial resource strain: Not on file     Food insecurity - worry: Not on file     Food insecurity - inability: Not on file     Transportation needs - medical: Not on file     Transportation needs - non-medical: Not on file   Occupational History     Not on file   Tobacco Use     Smoking status: Current Every Day Smoker     Packs/day: 0.50     Types: Cigarettes     Smokeless tobacco: Never Used   Substance and Sexual Activity     Alcohol use: No     Comment: 9 months sober.     Drug use: No     Sexual activity: Not Currently   Other Topics Concern     Not on file   Social History Narrative    Single. No children. Under disability.          Current Outpatient Medications   Medication Sig Dispense  "Refill     carboxymethylcellulose sodium 0.5 % Drop 2 drops 3 times daily for dry eyes. 30 mL 3     cholecalciferol, vitamin D3, (VITAMIN D3) 5,000 unit Tab Once daily 90 each 3     clotrimazole (LOTRIMIN) 1 % cream APPLY EXTERNALLY TO THE AFFECTED AREA TWICE DAILY 30 g 0     diclofenac sodium (VOLTAREN) 1 % Gel APPLY 2 GRAMS TOPICALLY QID.  6     hydroCHLOROthiazide (HYDRODIURIL) 25 MG tablet Take 1 tablet (25 mg total) by mouth daily. 90 tablet 3     meloxicam (MOBIC) 15 MG tablet Take 1 tablet (15 mg total) by mouth daily. 90 tablet 3     methadone (DOLOPHINE) 10 mg/5 mL solution Take 45 mg by mouth every 12 (twelve) hours.       omeprazole (PRILOSEC) 20 MG capsule TAKE 1 CAPSULE BY MOUTH EVERY DAY 1 HOUR BEFORE A MEAL 90 capsule 2     potassium chloride (K-DUR,KLOR-CON) 20 MEQ tablet TAKE 1 TABLET BY MOUTH EVERY 24 HOURS 90 tablet 2     sertraline (ZOLOFT) 100 MG tablet Take 1 tablet (100 mg total) by mouth at bedtime. 30 tablet 6     No current facility-administered medications for this visit.        Allergies   Allergen Reactions     Hay Fever And Allergy Relief      Penicillins Nausea Only       Physical Exam:  /62   Pulse 78   Ht 5' 2\" (1.575 m)   Wt 217 lb (98.4 kg)   SpO2 93%   BMI 39.69 kg/m    BMI:Body mass index is 39.69 kg/m .   GEN: NAD, obese  Psych: normal mood, normal affect    Labs/Studies:     I reviewed the efficacy and compliance report from his device. Data summarized on the HPI and the CPAP compliance flow sheet.     Lab Results   Component Value Date    WBC 7.7 06/28/2018    HGB 13.4 (L) 06/28/2018    HCT 41.8 06/28/2018    MCV 82 06/28/2018     06/28/2018         Chemistry        Component Value Date/Time     (L) 06/28/2018 1522    K 4.1 06/28/2018 1522    CL 96 (L) 06/28/2018 1522    CO2 31 06/28/2018 1522    BUN 16 06/28/2018 1522    CREATININE 1.15 06/28/2018 1522    GLU 89 06/28/2018 1522        Component Value Date/Time    CALCIUM 9.6 06/28/2018 1522    ALKPHOS " 74 06/28/2018 1522    AST 23 06/28/2018 1522    ALT 13 06/28/2018 1522    BILITOT 0.5 06/28/2018 1522            No results found for: FERRITIN         Assessment and Plan:  In summary Oscar Mojica is a 58 y.o. year old male who is here for compliance download review.    1.  Obstructive sleep apnea on CPAP  I encouraged the patient try to increase his hours of usage on CPAP as much as possible.  I will lower his pressure setting empirically to 7-13 CWP.  I strongly advised the patient to bring his CPAP machine with him on the next clinical visit.  2.  Other sleep disturbance     Patient verbalized understanding of these issues, agrees with the plan and all questions were answered today. Patient was given an opportuntity to voice any other symptoms or concerns not listed above. Patient did not have any other symptoms or concerns.      Patient told to return in 2 months. Patient instructed to stop at  to schedule appointment before leaving today.    Antonio Israel DO  Board Certified in Internal Medicine and Sleep Medicine  [facility].    I spent a total of 5 minutes of face-to-face encounter and more than 50% of the encounter was used for counseling or coordination of care.    (Note created with Dragon voice recognition and unintended spelling errors and word substitutions may occur)

## 2021-06-23 ENCOUNTER — COMMUNICATION - HEALTHEAST (OUTPATIENT)
Dept: INTERNAL MEDICINE | Facility: CLINIC | Age: 61
End: 2021-06-23

## 2021-06-24 NOTE — TELEPHONE ENCOUNTER
RN cannot approve Refill Request    RN can NOT refill this medication med is not covered by policy/route to provider. Last office visit: 11/21/2018 Chele Hodge MD Last Physical: 12/7/2017 Last MTM visit: Visit date not found Last visit same specialty: 11/21/2018 Chele Hodge MD.  Next visit within 3 mo: Visit date not found  Next physical within 3 mo: Visit date not found      Alondra Escoto, [facility] Triage/Med Refill 3/9/2019    Requested Prescriptions   Pending Prescriptions Disp Refills     diclofenac sodium (VOLTAREN) 1 % Gel [Pharmacy Med Name: DICLOFENAC 1% GEL 100GM] 300 g 0     Sig: APPLY 2 GRAMS TOPICALLY FOUR TIMES DAILY.    There is no refill protocol information for this order

## 2021-07-01 ENCOUNTER — COMMUNICATION - HEALTHEAST (OUTPATIENT)
Dept: INTERNAL MEDICINE | Facility: CLINIC | Age: 61
End: 2021-07-01

## 2021-07-01 DIAGNOSIS — I10 ESSENTIAL HYPERTENSION: ICD-10-CM

## 2021-07-02 ENCOUNTER — MEDICAL CORRESPONDENCE (OUTPATIENT)
Dept: HEALTH INFORMATION MANAGEMENT | Facility: CLINIC | Age: 61
End: 2021-07-02

## 2021-07-03 NOTE — ADDENDUM NOTE
Addendum Note by Osiel Hill, PharmD at 6/17/2020 10:46 AM     Author: Osiel Hill, PharmD Service: -- Author Type: Pharmacist    Filed: 6/17/2020 10:46 AM Encounter Date: 6/15/2020 Status: Signed    : Osiel Hill, PharmD (Pharmacist)    Addended by: OSIEL HILL on: 6/17/2020 10:46 AM        Modules accepted: Orders

## 2021-07-03 NOTE — ADDENDUM NOTE
Addendum Note by Osiel Hill, PharmD at 4/3/2019 12:30 PM     Author: Osiel Hill, PharmD Service: -- Author Type: Pharmacist    Filed: 4/9/2019 11:19 AM Encounter Date: 4/3/2019 Status: Signed    : Osiel Hill, PharmD (Pharmacist)    Addended by: OSIEL HILL on: 4/9/2019 11:19 AM        Modules accepted: Orders

## 2021-07-03 NOTE — ADDENDUM NOTE
Addendum Note by Osiel Hill, PharmD at 6/17/2020 10:45 AM     Author: Osiel Hill, PharmD Service: -- Author Type: Pharmacist    Filed: 6/17/2020 10:45 AM Encounter Date: 6/15/2020 Status: Signed    : Osiel Hill, PharmD (Pharmacist)    Addended by: OSIEL HILL on: 6/17/2020 10:45 AM        Modules accepted: Orders

## 2021-07-03 NOTE — ADDENDUM NOTE
Addendum Note by Chele Hodge MD at 5/21/2019  4:30 PM     Author: Chele Hodge MD Service: -- Author Type: Physician    Filed: 5/22/2019  7:23 AM Encounter Date: 5/21/2019 Status: Signed    : Chele Hodge MD (Physician)    Addended by: CHELE HODGE on: 5/22/2019 07:23 AM        Modules accepted: Orders

## 2021-07-04 NOTE — TELEPHONE ENCOUNTER
Telephone Encounter by Oliver Martel, RN at 7/1/2021  9:47 AM     Author: Oliver Martel, RN Service: -- Author Type: Registered Nurse    Filed: 7/1/2021  9:48 AM Encounter Date: 7/1/2021 Status: Signed    : Oliver Martel, RN (Registered Nurse)       Refill Approved    Rx renewed per Medication Renewal Policy. Medication was last renewed on 12/23/20.    Oliver Martel, [facility] Triage/Med Refill 7/1/2021     Requested Prescriptions   Pending Prescriptions Disp Refills   ? hydroCHLOROthiazide (HYDRODIURIL) 25 MG tablet [Pharmacy Med Name: HYDROCHLOROTHIAZIDE 25MG TABLETS] 90 tablet 1     Sig: TAKE 1 TABLET(25 MG) BY MOUTH DAILY       Diuretics/Combination Diuretics Refill Protocol  Passed - 7/1/2021  6:09 AM        Passed - Visit with PCP or prescribing provider visit in past 12 months     Last office visit with prescriber/PCP: 2/2/2021 Chele Hodge MD OR same dept: 10/13/2020 Chele Hodge MD OR same specialty: 2/2/2021 Chele Hodge MD  Last physical: 6/23/2021 Last MTM visit: Visit date not found   Next visit within 3 mo: Visit date not found  Next physical within 3 mo: Visit date not found  Prescriber OR PCP: Chele Hodge MD  Last diagnosis associated with med order: 1. Essential hypertension  - hydroCHLOROthiazide (HYDRODIURIL) 25 MG tablet [Pharmacy Med Name: HYDROCHLOROTHIAZIDE 25MG TABLETS]; TAKE 1 TABLET(25 MG) BY MOUTH DAILY  Dispense: 90 tablet; Refill: 1    If protocol passes may refill for 12 months if within 3 months of last provider visit (or a total of 15 months).             Passed - Serum Potassium in past 12 months      Lab Results   Component Value Date    Potassium 4.4 02/02/2021             Passed - Serum Sodium in past 12 months      Lab Results   Component Value Date    Sodium 136 02/02/2021             Passed - Blood pressure on file in past 12 months     BP Readings from Last 1 Encounters:   06/23/21 122/66             Passed - Serum Creatinine in past 12  months      Creatinine   Date Value Ref Range Status   02/02/2021 1.24 0.70 - 1.30 mg/dL Final

## 2021-07-04 NOTE — LETTER
Letter by Chele Hodge MD at      Author: Chele Hodge MD Service: -- Author Type: --    Filed:  Encounter Date: 6/23/2021 Status: (Other)         June 23, 2021     Patient: Oscar Mojica   YOB: 1960   Date of Visit: 6/23/2021       To Whom It May Concern:    I recommend that Oscar Mojica to start regular exercise he can tolerate in the gym of his choice.     If you have any questions or concerns, please don't hesitate to call.    Sincerely,        Electronically signed by Chele Hodge MD

## 2021-07-09 ENCOUNTER — COMMUNICATION - HEALTHEAST (OUTPATIENT)
Dept: INTERNAL MEDICINE | Facility: CLINIC | Age: 61
End: 2021-07-09

## 2021-07-09 DIAGNOSIS — M15.0 PRIMARY OSTEOARTHRITIS INVOLVING MULTIPLE JOINTS: ICD-10-CM

## 2021-07-09 NOTE — TELEPHONE ENCOUNTER
Telephone Encounter by Thelma Barragan RN at 7/9/2021  1:05 PM     Author: Thelma Barragan RN Service: -- Author Type: Registered Nurse    Filed: 7/9/2021  1:06 PM Encounter Date: 7/9/2021 Status: Signed    : Thelma Barragan RN (Registered Nurse)       RN cannot approve Refill Request    RN can NOT refill this medication med is not covered by policy/route to provider. Last office visit: 2/2/2021 Chele Hodge MD Last Physical: 6/23/2021 Last MTM visit: Visit date not found Last visit same specialty: 2/2/2021 Chele Hodge MD.  Next visit within 3 mo: Visit date not found  Next physical within 3 mo: Visit date not found      Thelma A Hustvet, [facility] Triage/Med Refill 7/9/2021    Requested Prescriptions   Pending Prescriptions Disp Refills   ? diclofenac sodium (VOLTAREN) 1 % Gel [Pharmacy Med Name: DICLOFENAC 1% GEL 100GM] 300 g 0     Sig: APPLY 2 GRAMS TOPICALLY FOUR TIMES DAILY.       There is no refill protocol information for this order

## 2021-07-14 PROBLEM — I20.1 CORONARY ARTERY VASOSPASM (H): Status: RESOLVED | Noted: 2017-07-29 | Resolved: 2021-06-23

## 2021-07-14 PROBLEM — F32.A DEPRESSION: Status: RESOLVED | Noted: 2021-06-01 | Resolved: 2021-06-23

## 2021-07-14 PROBLEM — B18.2 HEP C W/O COMA, CHRONIC (H): Status: RESOLVED | Noted: 2021-06-01 | Resolved: 2021-06-23

## 2021-07-15 DIAGNOSIS — K21.9 GASTROESOPHAGEAL REFLUX DISEASE WITHOUT ESOPHAGITIS: Primary | ICD-10-CM

## 2021-07-24 DIAGNOSIS — I10 ESSENTIAL HYPERTENSION: Primary | ICD-10-CM

## 2021-07-27 RX ORDER — POTASSIUM CHLORIDE 1500 MG/1
TABLET, EXTENDED RELEASE ORAL
Qty: 90 TABLET | Refills: 3 | Status: SHIPPED | OUTPATIENT
Start: 2021-07-27 | End: 2023-12-14

## 2021-08-03 PROBLEM — F14.929: Status: RESOLVED | Noted: 2017-07-29 | Resolved: 2021-06-23

## 2021-09-01 DIAGNOSIS — R79.89 LOW VITAMIN D LEVEL: Primary | ICD-10-CM

## 2021-09-02 RX ORDER — CHOLECALCIFEROL (VITAMIN D3) 125 MCG
CAPSULE ORAL
Qty: 90 CAPSULE | Refills: 3 | Status: SHIPPED | OUTPATIENT
Start: 2021-09-02 | End: 2022-11-30

## 2021-09-02 NOTE — TELEPHONE ENCOUNTER
"  Disp Refills Start End DAVID    cholecalciferol, vitamin D3, (VITAMIN D3) 5,000 unit Tab 90 each 3 7/30/2020  No   Sig: Once daily   Sent to pharmacy as: cholecalciferol (vitamin D3) 125 mcg (5,000 unit) tablet (Vitamin D3)   E-Prescribing Status: Receipt confirmed by pharmacy (7/30/2020  9:51 AM CDT)       Last Written Prescription Date:  7/30/20  Last Fill Quantity: 90,  # refills: 3   Last office visit provider:  6/23/21     Requested Prescriptions   Pending Prescriptions Disp Refills     Cholecalciferol (VITAMIN D) 125 MCG (5000 UT) capsule [Pharmacy Med Name: VITAMIN D3 5000 UNIT CAPSULES] 90 capsule      Sig: TAKE 1 CAPSULE BY MOUTH ONCE DAILY       Vitamin Supplements (Adult) Protocol Passed - 9/1/2021  4:40 PM        Passed - High dose Vitamin D not ordered        Passed - Recent (12 mo) or future (30 days) visit within the authorizing provider's specialty     Patient has had an office visit with the authorizing provider or a provider within the authorizing providers department within the previous 12 mos or has a future within next 30 days. See \"Patient Info\" tab in inbasket, or \"Choose Columns\" in Meds & Orders section of the refill encounter.              Passed - Medication is active on med list             Oliver Martel RN 09/02/21 12:48 PM  "

## 2021-09-13 ENCOUNTER — TELEPHONE (OUTPATIENT)
Dept: INTERNAL MEDICINE | Facility: CLINIC | Age: 61
End: 2021-09-13

## 2021-09-13 NOTE — TELEPHONE ENCOUNTER
Pt is calling for appt with Dr. Hodge.  Reminded patient that Dr. Hodge is retiring.    Wants to schedule Physical.  Would like to know who Dr. Hodge recommends he should establish care with.

## 2021-09-29 DIAGNOSIS — R52 PAIN: Primary | ICD-10-CM

## 2021-09-30 ENCOUNTER — NURSE TRIAGE (OUTPATIENT)
Dept: NURSING | Facility: CLINIC | Age: 61
End: 2021-09-30

## 2021-09-30 ENCOUNTER — HOSPITAL ENCOUNTER (EMERGENCY)
Facility: HOSPITAL | Age: 61
Discharge: LEFT BEFORE DISCHARGE | End: 2021-09-30
Payer: COMMERCIAL

## 2021-09-30 VITALS
RESPIRATION RATE: 18 BRPM | HEART RATE: 92 BPM | SYSTOLIC BLOOD PRESSURE: 141 MMHG | DIASTOLIC BLOOD PRESSURE: 71 MMHG | OXYGEN SATURATION: 95 % | TEMPERATURE: 98.3 F

## 2021-09-30 NOTE — TELEPHONE ENCOUNTER
"Pt reports he is on a methadone program and he is following it properly but \"feeling funny\". Pt reports \"blurry eyesight for over a week\". Pt states he has conctacted his methadone program and \"they are going to see me tomorrow\". Pt then stated that his sister was calling him and wanted Writer to hold. Pt also stated he was wondering if he could come into clinic for evaluation rather than ER visit as recommended per protocol.    Writer advised pt he can call back if he needs to take anther call. Writer advised pt message can be sent to clinic for PCP or covering provider to advise. If no response ER visit recommended today.     Pt verbalizes understanding and agrees to plan.     Reason for Disposition    [1] Blurred vision or visual changes AND [2] present now AND [3] sudden onset or new (e.g., minutes, hours, days)  (Exception: seeing floaters / black specks OR previously diagnosed migraine headaches with same symptoms)    Protocols used: VISION LOSS OR CHANGE-A-AH      "

## 2021-09-30 NOTE — ED TRIAGE NOTES
Patient with multiple complaints.  C/o blurred vision x 1 week.  Also reports urine incontinence x 1 week which is new for him.  C/o throat pain as well.  Has had COVID exposure.  Also, mentions that he is tapering his methadone dose at this time and wondering if this is contributing to his symptoms.

## 2021-09-30 NOTE — TELEPHONE ENCOUNTER
" Disp Refills Start End DAVID   meloxicam (MOBIC) 15 MG tablet 90 tablet 3 9/21/2020  No   Sig: TAKE 1 TABLET(15 MG) BY MOUTH DAILY   Sent to pharmacy as: meloxicam 15 mg tablet (MOBIC)   E-Prescribing Status: Receipt confirmed by pharmacy (9/21/2020  2:08 PM CDT)       meloxicam (MOBIC) 15 MG tablet [[id number]]    Electronically signed by: Chele Hodge MD on 09/21/20 1408 Status: Active   Ordering user: Chele Hodge MD 09/21/20 1408 Authorized by: Chele Hodge MD   Frequency:  09/21/20 - Until Discontinued Released by: Chele Hodge MD 09/21/20 1408   Diagnoses  Back pain [M54.9]       Routing refill request to provider for review/approval because:  Labs out of range:  CBC    Last Written Prescription Date:  9/21/2020  Last Fill Quantity: 90,  # refills: 3   Last office visit provider:  6/23/21     Requested Prescriptions   Pending Prescriptions Disp Refills     meloxicam (MOBIC) 15 MG tablet [Pharmacy Med Name: MELOXICAM 15MG TABLETS] 90 tablet      Sig: TAKE 1 TABLET(15 MG) BY MOUTH DAILY       NSAID Medications Failed - 9/29/2021  6:02 AM        Failed - Normal CBC on file in past 12 months     Recent Labs   Lab Test 02/02/21  1131   WBC 6.8   RBC 4.91   HGB 12.3*   HCT 37.9*                    Passed - Blood pressure under 140/90 in past 12 months     BP Readings from Last 3 Encounters:   06/23/21 122/66   02/02/21 122/62   10/13/20 (!) 162/74                 Passed - Normal ALT on file in past 12 months     Recent Labs   Lab Test 02/02/21  1131   ALT 14             Passed - Normal AST on file in past 12 months     Recent Labs   Lab Test 02/02/21  1131   AST 22             Passed - Recent (12 mo) or future (30 days) visit within the authorizing provider's specialty     Patient has had an office visit with the authorizing provider or a provider within the authorizing providers department within the previous 12 mos or has a future within next 30 days. See \"Patient Info\" tab in " "inbasket, or \"Choose Columns\" in Meds & Orders section of the refill encounter.              Passed - Patient is age 6-64 years        Passed - Medication is active on med list        Passed - Normal serum creatinine on file in past 12 months     Recent Labs   Lab Test 02/02/21  1131   CR 1.24       Ok to refill medication if creatinine is low               Emmanuel Mejia RN 09/30/21 12:13 PM  "

## 2021-10-01 RX ORDER — MELOXICAM 15 MG/1
TABLET ORAL
Qty: 90 TABLET | Refills: 0 | Status: SHIPPED | OUTPATIENT
Start: 2021-10-01 | End: 2021-12-28

## 2021-10-11 ENCOUNTER — TELEPHONE (OUTPATIENT)
Dept: INTERNAL MEDICINE | Facility: CLINIC | Age: 61
End: 2021-10-11

## 2021-10-11 DIAGNOSIS — E11.9 TYPE 2 DIABETES MELLITUS WITHOUT COMPLICATION, WITHOUT LONG-TERM CURRENT USE OF INSULIN (H): Primary | ICD-10-CM

## 2021-10-11 NOTE — TELEPHONE ENCOUNTER
Pt is scheduled to establish care with Dr. Dennison on 11/26/2021    Pt is requesting to have his A1C checked before the 11/26/21 appt.    Requesting lab order

## 2021-10-13 NOTE — TELEPHONE ENCOUNTER
Patient wants it done before his appt.    I don't mind either way, whatever patient wants    Dr. Dennison

## 2021-10-13 NOTE — TELEPHONE ENCOUNTER
Attempted to call patient x 2 however received a message stating that the person we are trying to reach can not accept calls at this time. Ok to have labs done after patient's appt on 11/26/2021?   Osiel Angela CMA on 10/13/2021 at 11:59 AM

## 2021-10-14 NOTE — TELEPHONE ENCOUNTER
Attempt #3 to contact patient. Number is not accepting calls at this time. If patient calls back he may get his labs done before his appointment or at this appointment. Does not matter

## 2021-10-15 ENCOUNTER — TELEPHONE (OUTPATIENT)
Dept: INTERNAL MEDICINE | Facility: CLINIC | Age: 61
End: 2021-10-15
Payer: COMMERCIAL

## 2021-10-15 DIAGNOSIS — E11.9 TYPE 2 DIABETES MELLITUS WITHOUT COMPLICATION, WITHOUT LONG-TERM CURRENT USE OF INSULIN (H): Primary | ICD-10-CM

## 2021-10-15 NOTE — TELEPHONE ENCOUNTER
Patient and  calling to request rx for a blood glucose machine to be sent to his pharmacy.  Patient is open to whatever insurance will cover.  Patient stating he's never had a machine before but feels he should be using one for his diabetes to ensure that he's within range on a regular basis.      Any questions, please call patient at [phone]. Okay to leave message if needed.

## 2021-10-23 DIAGNOSIS — I10 ESSENTIAL HYPERTENSION: Primary | ICD-10-CM

## 2021-10-25 RX ORDER — LOSARTAN POTASSIUM 100 MG/1
TABLET ORAL
Qty: 90 TABLET | Refills: 1 | Status: SHIPPED | OUTPATIENT
Start: 2021-10-25 | End: 2022-07-28

## 2021-10-25 NOTE — TELEPHONE ENCOUNTER
"Last Written Prescription Date:  10/6/2020  Last Fill Quantity: 90,  # refills: 3   Last office visit provider:  6/23/2021- Dr Hodge    losartan (COZAAR) 100 MG tablet 90 tablet 3 10/6/2020  No   Sig - Route: Take 1 tablet (100 mg total) by mouth daily. - Oral   Sent to pharmacy as: losartan 100 mg tablet (COZAAR)   E-Prescribing Status: Receipt confirmed by pharmacy (10/6/2020  9:35 AM CDT       Requested Prescriptions   Pending Prescriptions Disp Refills     losartan (COZAAR) 100 MG tablet [Pharmacy Med Name: LOSARTAN 100MG TABLETS] 90 tablet      Sig: TAKE 1 TABLET(100 MG) BY MOUTH DAILY       Angiotensin-II Receptors Failed - 10/23/2021  6:03 AM        Failed - Last blood pressure under 140/90 in past 12 months     BP Readings from Last 3 Encounters:   06/23/21 122/66   02/02/21 122/62   10/13/20 (!) 162/74                 Passed - Recent (12 mo) or future (30 days) visit within the authorizing provider's specialty     Patient has had an office visit with the authorizing provider or a provider within the authorizing providers department within the previous 12 mos or has a future within next 30 days. See \"Patient Info\" tab in inbasket, or \"Choose Columns\" in Meds & Orders section of the refill encounter.              Passed - Medication is active on med list        Passed - Patient is age 18 or older        Passed - Normal serum creatinine on file in past 12 months     Recent Labs   Lab Test 02/02/21  1131   CR 1.24       Ok to refill medication if creatinine is low          Passed - Normal serum potassium on file in past 12 months     Recent Labs   Lab Test 02/02/21  1131   POTASSIUM 4.4                         Rivka Lawsno RN 10/25/21 12:07 AM  "

## 2021-10-28 ENCOUNTER — TRANSFERRED RECORDS (OUTPATIENT)
Dept: HEALTH INFORMATION MANAGEMENT | Facility: CLINIC | Age: 61
End: 2021-10-28
Payer: COMMERCIAL

## 2021-11-26 PROBLEM — F10.21 ALCOHOL DEPENDENCE IN REMISSION (H): Status: ACTIVE | Noted: 2017-07-29

## 2021-11-26 PROBLEM — F19.10 CHEMICAL ABUSE (H): Status: ACTIVE | Noted: 2019-10-30

## 2021-11-26 PROBLEM — F14.929: Status: ACTIVE | Noted: 2017-07-29

## 2021-11-26 PROBLEM — E78.2 MIXED HYPERLIPIDEMIA: Status: ACTIVE | Noted: 2019-09-18

## 2021-11-26 PROBLEM — I20.1 CORONARY ARTERY VASOSPASM (H): Status: ACTIVE | Noted: 2017-07-29

## 2021-12-28 DIAGNOSIS — R52 PAIN: ICD-10-CM

## 2021-12-28 RX ORDER — MELOXICAM 15 MG/1
TABLET ORAL
Qty: 60 TABLET | Refills: 0 | Status: SHIPPED | OUTPATIENT
Start: 2021-12-28 | End: 2022-03-17

## 2022-02-04 DIAGNOSIS — E78.5 DYSLIPIDEMIA: Primary | ICD-10-CM

## 2022-02-04 RX ORDER — FENOFIBRATE 48 MG/1
TABLET, COATED ORAL
Qty: 90 TABLET | Refills: 0 | Status: SHIPPED | OUTPATIENT
Start: 2022-02-04 | End: 2022-05-11

## 2022-03-17 DIAGNOSIS — R52 PAIN: ICD-10-CM

## 2022-03-17 RX ORDER — MELOXICAM 15 MG/1
TABLET ORAL
Qty: 0.0001 TABLET | Refills: 0 | Status: SHIPPED | OUTPATIENT
Start: 2022-03-17 | End: 2022-11-30

## 2022-03-25 DIAGNOSIS — Z91.148 NONCOMPLIANCE WITH MEDICATION REGIMEN: Primary | ICD-10-CM

## 2022-05-08 DIAGNOSIS — Z91.148 NONCOMPLIANCE WITH MEDICATION REGIMEN: ICD-10-CM

## 2022-05-10 DIAGNOSIS — E78.5 DYSLIPIDEMIA: ICD-10-CM

## 2022-05-11 RX ORDER — FENOFIBRATE 48 MG/1
48 TABLET, COATED ORAL DAILY
Qty: 30 TABLET | Refills: 0 | Status: SHIPPED | OUTPATIENT
Start: 2022-05-11 | End: 2022-06-09

## 2022-05-26 ENCOUNTER — TRANSFERRED RECORDS (OUTPATIENT)
Dept: HEALTH INFORMATION MANAGEMENT | Facility: CLINIC | Age: 62
End: 2022-05-26
Payer: COMMERCIAL

## 2022-06-09 DIAGNOSIS — E78.5 DYSLIPIDEMIA: ICD-10-CM

## 2022-06-09 RX ORDER — FENOFIBRATE 48 MG/1
TABLET, COATED ORAL
Qty: 30 TABLET | Refills: 0 | Status: SHIPPED | OUTPATIENT
Start: 2022-06-09 | End: 2022-07-28

## 2022-06-21 ENCOUNTER — TRANSFERRED RECORDS (OUTPATIENT)
Dept: HEALTH INFORMATION MANAGEMENT | Facility: CLINIC | Age: 62
End: 2022-06-21

## 2022-06-22 DIAGNOSIS — Z91.148 NONCOMPLIANCE WITH MEDICATION REGIMEN: ICD-10-CM

## 2022-07-28 ENCOUNTER — OFFICE VISIT (OUTPATIENT)
Dept: FAMILY MEDICINE | Facility: CLINIC | Age: 62
End: 2022-07-28
Payer: COMMERCIAL

## 2022-07-28 VITALS
WEIGHT: 176 LBS | HEIGHT: 61 IN | OXYGEN SATURATION: 96 % | BODY MASS INDEX: 33.23 KG/M2 | HEART RATE: 76 BPM | TEMPERATURE: 98.4 F | SYSTOLIC BLOOD PRESSURE: 140 MMHG | DIASTOLIC BLOOD PRESSURE: 85 MMHG | RESPIRATION RATE: 18 BRPM

## 2022-07-28 DIAGNOSIS — M25.562 CHRONIC PAIN OF LEFT KNEE: ICD-10-CM

## 2022-07-28 DIAGNOSIS — Z12.11 SPECIAL SCREENING FOR MALIGNANT NEOPLASMS, COLON: ICD-10-CM

## 2022-07-28 DIAGNOSIS — E11.9 TYPE 2 DIABETES MELLITUS WITHOUT COMPLICATION, WITHOUT LONG-TERM CURRENT USE OF INSULIN (H): Primary | ICD-10-CM

## 2022-07-28 DIAGNOSIS — F32.0 CURRENT MILD EPISODE OF MAJOR DEPRESSIVE DISORDER WITHOUT PRIOR EPISODE (H): ICD-10-CM

## 2022-07-28 DIAGNOSIS — E78.5 DYSLIPIDEMIA: ICD-10-CM

## 2022-07-28 DIAGNOSIS — Z91.148 NONCOMPLIANCE WITH MEDICATION REGIMEN: ICD-10-CM

## 2022-07-28 DIAGNOSIS — I10 PRIMARY HYPERTENSION: ICD-10-CM

## 2022-07-28 DIAGNOSIS — I10 ESSENTIAL HYPERTENSION: ICD-10-CM

## 2022-07-28 DIAGNOSIS — G89.29 CHRONIC PAIN OF LEFT KNEE: ICD-10-CM

## 2022-07-28 LAB
ALBUMIN SERPL BCG-MCNC: 4.5 G/DL (ref 3.5–5.2)
ALP SERPL-CCNC: 63 U/L (ref 40–129)
ALT SERPL W P-5'-P-CCNC: 12 U/L (ref 10–50)
ANION GAP SERPL CALCULATED.3IONS-SCNC: 8 MMOL/L (ref 7–15)
AST SERPL W P-5'-P-CCNC: 24 U/L (ref 10–50)
BILIRUB SERPL-MCNC: 0.3 MG/DL
BUN SERPL-MCNC: 19.4 MG/DL (ref 8–23)
CALCIUM SERPL-MCNC: 9.4 MG/DL (ref 8.8–10.2)
CHLORIDE SERPL-SCNC: 95 MMOL/L (ref 98–107)
CHOLEST SERPL-MCNC: 207 MG/DL
CREAT SERPL-MCNC: 1.03 MG/DL (ref 0.67–1.17)
CREAT UR-MCNC: 99.4 MG/DL
DEPRECATED HCO3 PLAS-SCNC: 30 MMOL/L (ref 22–29)
GFR SERPL CREATININE-BSD FRML MDRD: 83 ML/MIN/1.73M2
GLUCOSE SERPL-MCNC: 84 MG/DL (ref 70–99)
HBA1C MFR BLD: 6.1 % (ref 0–5.6)
HDLC SERPL-MCNC: 55 MG/DL
LDLC SERPL CALC-MCNC: 115 MG/DL
MICROALBUMIN UR-MCNC: <12 MG/L
MICROALBUMIN/CREAT UR: NORMAL MG/G{CREAT}
NONHDLC SERPL-MCNC: 152 MG/DL
POTASSIUM SERPL-SCNC: 4.3 MMOL/L (ref 3.4–5.3)
PROT SERPL-MCNC: 7.2 G/DL (ref 6.4–8.3)
SODIUM SERPL-SCNC: 133 MMOL/L (ref 136–145)
TRIGL SERPL-MCNC: 186 MG/DL

## 2022-07-28 PROCEDURE — 99214 OFFICE O/P EST MOD 30 MIN: CPT | Mod: 25 | Performed by: FAMILY MEDICINE

## 2022-07-28 PROCEDURE — 90677 PCV20 VACCINE IM: CPT | Performed by: FAMILY MEDICINE

## 2022-07-28 PROCEDURE — 83036 HEMOGLOBIN GLYCOSYLATED A1C: CPT | Performed by: FAMILY MEDICINE

## 2022-07-28 PROCEDURE — 82043 UR ALBUMIN QUANTITATIVE: CPT | Performed by: FAMILY MEDICINE

## 2022-07-28 PROCEDURE — 80061 LIPID PANEL: CPT | Performed by: FAMILY MEDICINE

## 2022-07-28 PROCEDURE — 90471 IMMUNIZATION ADMIN: CPT | Performed by: FAMILY MEDICINE

## 2022-07-28 PROCEDURE — 36415 COLL VENOUS BLD VENIPUNCTURE: CPT | Performed by: FAMILY MEDICINE

## 2022-07-28 PROCEDURE — 80053 COMPREHEN METABOLIC PANEL: CPT | Performed by: FAMILY MEDICINE

## 2022-07-28 RX ORDER — HYDROCHLOROTHIAZIDE 25 MG/1
25 TABLET ORAL DAILY
Qty: 90 TABLET | Refills: 1 | Status: SHIPPED | OUTPATIENT
Start: 2022-07-28 | End: 2023-07-24

## 2022-07-28 RX ORDER — LOSARTAN POTASSIUM 100 MG/1
100 TABLET ORAL DAILY
Qty: 90 TABLET | Refills: 1 | Status: SHIPPED | OUTPATIENT
Start: 2022-07-28 | End: 2023-07-24

## 2022-07-28 RX ORDER — METFORMIN HCL 500 MG
500 TABLET, EXTENDED RELEASE 24 HR ORAL
Qty: 90 TABLET | Refills: 1 | Status: SHIPPED | OUTPATIENT
Start: 2022-07-28 | End: 2023-07-24

## 2022-07-28 RX ORDER — AMLODIPINE BESYLATE 5 MG/1
5 TABLET ORAL DAILY
Qty: 90 TABLET | Refills: 1 | Status: SHIPPED | OUTPATIENT
Start: 2022-07-28 | End: 2023-07-24

## 2022-07-28 RX ORDER — FENOFIBRATE 48 MG/1
48 TABLET, COATED ORAL DAILY
Qty: 90 TABLET | Refills: 1 | Status: SHIPPED | OUTPATIENT
Start: 2022-07-28 | End: 2023-07-24

## 2022-07-28 ASSESSMENT — PAIN SCALES - GENERAL: PAINLEVEL: NO PAIN (0)

## 2022-07-28 ASSESSMENT — PATIENT HEALTH QUESTIONNAIRE - PHQ9: SUM OF ALL RESPONSES TO PHQ QUESTIONS 1-9: 6

## 2022-07-28 NOTE — PROGRESS NOTES
Assessment & Plan     (F32.0) Current mild episode of major depressive disorder without prior episode (H)  (primary encounter diagnosis)  Comment: Continue current management   Plan: sertraline (ZOLOFT) 50 MG tablet            (E11.9) Type 2 diabetes mellitus without complication, without long-term current use of insulin (H)  Comment: off and on on Metformin  Would like to consider alternative medication if he were to continue diabetic medication, but still ok with continuing on metfomrin     Plan: Recheck labs:  BASIC METABOLIC PANEL, Albumin Random Urine         Quantitative with Creat Ratio, Comprehensive         metabolic panel, Lipid panel reflex to direct         LDL Fasting,        24 hr tablet, Hemoglobin A1c, CANCELED:         Hemoglobin A1c                  A1c of 6.1, satisfactory  May continue on metformin        (E78.5) Dyslipidemia  Comment: on  fenofibrate (TRICOR) 48 MG tablet    Plan: recheck labs     Metabolic panel is unremarkable, and cholesterol/LDL is elevated with the 10-year ASCVD risk score (Marlowluisa ESTRADA Jr., et al., 2013) is: 31.3%, recommending addition of statin (lipid lowering medication) to mitigate risk      (I10) Essential hypertension  Comment: elevated blood pressure     Continue current management   Refill : amLODIPine (NORVASC) 5 MG tablet, losartan         (COZAAR) 100 MG tablet     hydrochlorothiazide (HYDRODIURIL) 25 MG tablet        Avoid crack / cocaine       (M25.562,  G89.29) Chronic pain of left knee  Comment: refill   Plan: diclofenac (VOLTAREN) 1 % topical gel            (Z12.11) Special screening for malignant neoplasms, colon  Comment:   Plan: RAGHAV([employer])                           Depression Screening Follow Up    PHQ 7/28/2022   PHQ-9 Total Score 6   Q9: Thoughts of better off dead/self-harm past 2 weeks Several days     Last PHQ-9 7/28/2022   1.  Little interest or pleasure in doing things 1   2.  Feeling down, depressed, or hopeless 2   3.  Trouble  "falling or staying asleep, or sleeping too much 0   4.  Feeling tired or having little energy 1   5.  Poor appetite or overeating 0   6.  Feeling bad about yourself 1   7.  Trouble concentrating 0   8.  Moving slowly or restless 0   Q9: Thoughts of better off dead/self-harm past 2 weeks 1   PHQ-9 Total Score 6   Difficulty at work, home, or with people Somewhat difficult     Patient kristen for safety      Return in about 6 months (around 1/28/2023) for Follow up, in person.    Cameron Walker MD  [facility] TIA Moore is a 61 year old here for healthcare establishment, med recheck and follow-up of:    Diabetes :   He has stopped taking the metformin for a month, and losing weight, thinking that can whether the diabetes without the medication.  However, if you were to continue on the diabetic medication, he would like to try something different.    Hypertension:  On amlodipine 5 mg daily, hydrochlorothiazide 25 mg daily, and losartan 100 mg daily he also takes potassium chloride 20 M EQ daily.    He  notes to have stopped all his meds about a month and started taking since yesterday.   Of note, patient notes to smoke 20-40 dollars worth of crack cocaine a day.    Depression:  With a history of bipolar 1, currently on Zoloft 50 mg daily  Reporting doing well with no additional concerns, would like to continue with the current dosage    HLD:  On fenofibrate 40 mg daily  Needing his cholesterol levels rechecked    Chronic left knee pain:  Requesting a refill of the Voltaren gel      Review of Systems         Objective    BP (!) 140/85 (BP Location: Right arm, Patient Position: Sitting, Cuff Size: Adult Regular)   Pulse 76   Temp 98.4  F (36.9  C) (Oral)   Resp 18   Ht 1.549 m (5' 1\")   Wt 79.8 kg (176 lb)   SpO2 96%   BMI 33.25 kg/m    Body mass index is 33.25 kg/m .     Physical Exam   GENERAL: healthy, alert and no distress  NECK: no adenopathy, no asymmetry, masses, or " scars and thyroid normal to palpation  RESP: lungs clear to auscultation - no rales, rhonchi or wheezes  CV: regular rate and rhythm, normal S1 S2, no S3 or S4, no murmur, click or rub, no peripheral edema and peripheral pulses strong  ABDOMEN: soft, nontender, no hepatosplenomegaly, no masses and bowel sounds normal  MS: no gross musculoskeletal defects noted, no edema                    .  ..

## 2022-07-28 NOTE — LETTER
August 1, 2022      Oscar J Yunior  [address]  [place] [address]        Dear ,    We are writing to inform you of your test results.     A1c of 6.1, satisfactory   May continue on metformin     Metabolic panel is unremarkable, and cholesterol/LDL is elevated with the 10-year ASCVD risk score (Ellen ESTRADA Jr., et al., 2013) is: 31.3%, recommending addition of statin (lipid lowering medication) to mitigate risk.   Other Lab values marked (H) or (L) are not clinically/medically significant       Resulted Orders   Comprehensive metabolic panel   Result Value Ref Range    Sodium 133 (L) 136 - 145 mmol/L    Potassium 4.3 3.4 - 5.3 mmol/L    Creatinine 1.03 0.67 - 1.17 mg/dL    Urea Nitrogen 19.4 8.0 - 23.0 mg/dL    Chloride 95 (L) 98 - 107 mmol/L    Carbon Dioxide (CO2) 30 (H) 22 - 29 mmol/L    Anion Gap 8 7 - 15 mmol/L    Glucose 84 70 - 99 mg/dL    Calcium 9.4 8.8 - 10.2 mg/dL    Protein Total 7.2 6.4 - 8.3 g/dL    Albumin 4.5 3.5 - 5.2 g/dL    Bilirubin Total 0.3 <=1.2 mg/dL    Alkaline Phosphatase 63 40 - 129 U/L    AST 24 10 - 50 U/L    ALT 12 10 - 50 U/L    GFR Estimate 83 >60 mL/min/1.73m2      Comment:      Effective December 21, 2021 eGFRcr in adults is calculated using the 2021 CKD-EPI creatinine equation which includes age and gender (Remigio hedrick al., NEJM, DOI: 10.1056/EWGWqz7026433)   Lipid panel reflex to direct LDL Fasting   Result Value Ref Range    Cholesterol 207 (H) <200 mg/dL    Triglycerides 186 (H) <150 mg/dL    Direct Measure HDL 55 >=40 mg/dL    LDL Cholesterol Calculated 115 (H) <=100 mg/dL    Non HDL Cholesterol 152 (H) <130 mg/dL    Narrative    Cholesterol  Desirable:  <200 mg/dL    Triglycerides  Normal:  Less than 150 mg/dL  Borderline High:  150-199 mg/dL  High:  200-499 mg/dL  Very High:  Greater than or equal to 500 mg/dL    Direct Measure HDL  Female:  Greater than or equal to 50 mg/dL   Male:  Greater than or equal to 40 mg/dL    LDL Cholesterol  Desirable:   <100mg/dL  Above Desirable:  100-129 mg/dL   Borderline High:  130-159 mg/dL   High:  160-189 mg/dL   Very High:  >= 190 mg/dL    Non HDL Cholesterol  Desirable:  130 mg/dL  Above Desirable:  130-159 mg/dL  Borderline High:  160-189 mg/dL  High:  190-219 mg/dL  Very High:  Greater than or equal to 220 mg/dL   Albumin Random Urine Quantitative with Creat Ratio   Result Value Ref Range    Albumin Urine mg/L <12.0 mg/L      Comment:      The reference ranges have not been established in urine albumin. The results should be integrated into the clinical context for interpretation.    Albumin Urine mg/g Cr        Comment:      Unable to calculate, urine albumin and/or urine creatinine is outside detectable limits.  Microalbuminuria is defined as an albumin:creatinine ratio of 17 to 299 for males and 25 to 299 for females. A ratio of albumin:creatinine of 300 or higher is indicative of overt proteinuria.  Due to biologic variability, positive results should be confirmed by a second, first-morning random or 24-hour timed urine specimen. If there is discrepancy, a third specimen is recommended. When 2 out of 3 results are in the microalbuminuria range, this is evidence for incipient nephropathy and warrants increased efforts at glucose control, blood pressure control, and institution of therapy with an angiotensin-converting-enzyme (ACE) inhibitor (if the patient can tolerate it).      Creatinine Urine mg/dL 99.4 mg/dL      Comment:      The reference ranges have not been established in urine creatinine. The results should be integrated into the clinical context for interpretation.   Hemoglobin A1c   Result Value Ref Range    Hemoglobin A1C 6.1 (H) 0.0 - 5.6 %      Comment:      Normal <5.7%   Prediabetes 5.7-6.4%    Diabetes 6.5% or higher     Note: Adopted from ADA consensus guidelines.       If you have any questions or concerns, please call the clinic at the number listed above.       Sincerely,      Cameron Walker  MD

## 2022-08-01 ENCOUNTER — TELEPHONE (OUTPATIENT)
Dept: FAMILY MEDICINE | Facility: CLINIC | Age: 62
End: 2022-08-01

## 2022-08-01 NOTE — TELEPHONE ENCOUNTER
----- Message from Cameron Walker MD sent at 7/31/2022  7:59 PM CDT -----  Please call patient:  A1c of 6.1, satisfactory  May continue on metformin    Metabolic panel is unremarkable, and cholesterol/LDL is elevated with the 10-year ASCVD risk score (Ellen ESTRADA Jr., et al., 2013) is: 31.3%, recommending addition of statin (lipid lowering medication) to mitigate risk.  Other Lab values marked (H) or (L) are not clinically/medically significant

## 2022-08-22 DIAGNOSIS — E78.5 DYSLIPIDEMIA: ICD-10-CM

## 2022-08-23 RX ORDER — FENOFIBRATE 48 MG/1
TABLET, COATED ORAL
Qty: 30 TABLET | OUTPATIENT
Start: 2022-08-23

## 2022-09-01 DIAGNOSIS — K21.9 GASTROESOPHAGEAL REFLUX DISEASE WITHOUT ESOPHAGITIS: ICD-10-CM

## 2022-09-02 NOTE — TELEPHONE ENCOUNTER
"Last Written Prescription Date:  7/20/21  Last Fill Quantity: 90,  # refills: 3   Last office visit provider:  7/28/22     Requested Prescriptions   Pending Prescriptions Disp Refills     omeprazole (PRILOSEC) 20 MG DR capsule 90 capsule 3       PPI Protocol Passed - 9/1/2022  2:09 PM        Passed - Not on Clopidogrel (unless Pantoprazole ordered)        Passed - No diagnosis of osteoporosis on record        Passed - Recent (12 mo) or future (30 days) visit within the authorizing provider's specialty     Patient has had an office visit with the authorizing provider or a provider within the authorizing providers department within the previous 12 mos or has a future within next 30 days. See \"Patient Info\" tab in inbasket, or \"Choose Columns\" in Meds & Orders section of the refill encounter.              Passed - Medication is active on med list        Passed - Patient is age 18 or older             Marichuy Marie RN 09/02/22 1:38 PM  "

## 2022-11-28 DIAGNOSIS — R52 PAIN: ICD-10-CM

## 2022-11-29 NOTE — TELEPHONE ENCOUNTER
"Routing refill request to provider for review/approval because:  Labs not current:  CBC  BP not in range.    Last Written Prescription Date:  3/17/22  Last Fill Quantity: 0.0001,  # refills: 0   Last office visit provider:  7/28/22     Requested Prescriptions   Pending Prescriptions Disp Refills     meloxicam (MOBIC) 15 MG tablet [Pharmacy Med Name: MELOXICAM 15MG TABLETS] 60 tablet      Sig: TAKE 1 TABLET(15 MG) BY MOUTH DAILY       NSAID Medications Failed - 11/29/2022 12:01 PM        Failed - Blood pressure under 140/90 in past 12 months     BP Readings from Last 3 Encounters:   07/28/22 (!) 140/85   06/23/21 122/66   02/02/21 122/62                 Failed - Recent (12 mo) or future (30 days) visit within the authorizing provider's specialty     Patient has had an office visit with the authorizing provider or a provider within the authorizing providers department within the previous 12 mos or has a future within next 30 days. See \"Patient Info\" tab in inbasket, or \"Choose Columns\" in Meds & Orders section of the refill encounter.              Failed - Normal CBC on file in past 12 months     Recent Labs   Lab Test 02/02/21  1131   WBC 6.8   RBC 4.91   HGB 12.3*   HCT 37.9*                    Passed - Normal ALT on file in past 12 months     Recent Labs   Lab Test 07/28/22  1432   ALT 12             Passed - Normal AST on file in past 12 months     Recent Labs   Lab Test 07/28/22  1432   AST 24             Passed - Patient is age 6-64 years        Passed - Medication is active on med list        Passed - Normal serum creatinine on file in past 12 months     Recent Labs   Lab Test 07/28/22  1432   CR 1.03       Ok to refill medication if creatinine is low               Oliver Martel RN 11/29/22 12:02 PM  "

## 2022-11-30 DIAGNOSIS — R79.89 LOW VITAMIN D LEVEL: ICD-10-CM

## 2022-11-30 RX ORDER — MELOXICAM 15 MG/1
TABLET ORAL
Qty: 60 TABLET | Refills: 2 | Status: SHIPPED | OUTPATIENT
Start: 2022-11-30 | End: 2023-07-24

## 2022-12-01 RX ORDER — CHOLECALCIFEROL (VITAMIN D3) 125 MCG
1 CAPSULE ORAL DAILY
Qty: 90 CAPSULE | Refills: 2 | Status: SHIPPED | OUTPATIENT
Start: 2022-12-01

## 2022-12-01 NOTE — TELEPHONE ENCOUNTER
"Last Written Prescription Date:  9/2/21  Last Fill Quantity: 90,  # refills: 3   Last office visit provider:  7/28/22     Requested Prescriptions   Pending Prescriptions Disp Refills     Cholecalciferol (VITAMIN D) 125 MCG (5000 UT) capsule 90 capsule 3     Sig: Take 1 capsule (5,000 Units) by mouth daily       Vitamin Supplements (Adult) Protocol Passed - 12/1/2022 11:11 AM        Passed - High dose Vitamin D not ordered        Passed - Recent (12 mo) or future (30 days) visit within the authorizing provider's specialty     Patient has had an office visit with the authorizing provider or a provider within the authorizing providers department within the previous 12 mos or has a future within next 30 days. See \"Patient Info\" tab in inbasket, or \"Choose Columns\" in Meds & Orders section of the refill encounter.              Passed - Medication is active on med list             Oliver Martel RN 12/01/22 11:13 AM  "

## 2022-12-02 NOTE — TELEPHONE ENCOUNTER
I am out most of this week and do not have any openings available.  You can call [phone] which goes directly to my coordinators, and they can help him schedule with 1 of the other pharmacists this week.  This would be much faster than putting in a referral for them to reach out to [facility].     
Medication Question or Clarification  Who is calling: Patient  What medication are you calling about? (include dose and sig)   liraglutide (VICTOZA) 0.6 mg/0.1 mL (18 mg/3 mL) PnIj injection 3 mL 3 7/22/2019     Sig: Start 0.1ml injected daily for one week, then increase to 0.2ml (1.2mg) daily for a week and then 0.3 ml daily (1.8mg) daily if tolerated.        Who prescribed the medication?: Dennis Saini MD  What is your question/concern?: Patient states the above medication is making him nauseated. Patient states he is so nauseated when taking the medication, he is not able to eat. Please advise, and reach out to the patient.  Pharmacy: [facility] [address] [place]  Okay to leave a detailed message?: Yes  Site CMT - Please call the pharmacy to obtain any additional needed information.  
Please refer her to our pharm D, Rosy for guidance and recommendations. Please ask Rosy if she can see Yunior this week.   
Spoke with the patient and relayed message below from Dr. Hodge and Osiel Hill.  Patient verbalized understanding and had no further questions at this time.  Referral to medication management has been set up for Dr. Hodge to review.  Shanae GATICA, MARY/CMT....................1:47 PM    
Thony,  Would you please review the messages below and advise if you would be able to see this patient this week?  If so, please let me know and we can place the appropriate orders.  Thank you.  Shanae GATICA CMA/LOLIS....................11:58 AM    
28

## 2023-05-19 NOTE — TELEPHONE ENCOUNTER
Referral has been placed for Dr. Hodge to review.      Spoke with the patient and relayed message below from Dr. Hodge.  He verbalized understanding and had no further questions at this time.  Shanae GATICA, MARY/CMT....................1:16 PM     [Normal] : affect appropriate [de-identified] : Normal respirations [de-identified] : Soft, nontender, nondistended.  Incisions well-healed.

## 2023-06-15 ENCOUNTER — TELEPHONE (OUTPATIENT)
Dept: FAMILY MEDICINE | Facility: CLINIC | Age: 63
End: 2023-06-15
Payer: COMMERCIAL

## 2023-06-15 NOTE — TELEPHONE ENCOUNTER
received form with signed DARRIAN. [employer] [place] is requesting form to be filled out with ICD-10 diagnosis/medication list and have the provider sign. Med list and problem list printed off and attached.    Last OV: 07/28/2022    Form placed in Dr. ONOFRE's in-basket to see if this can be completed

## 2023-06-15 NOTE — LETTER
June 20, 2023      Oscar Mojica  [address]  [place] [address]        Dear Oscar,     We attempted to reach you via phone, but the number we have on file is not working. The form you dropped off to have Dr. ONOFRE complete will require an appointment.     Please call us at [phone] to schedule.       Sincerely,        Cameron Walker MD

## 2023-07-24 DIAGNOSIS — I10 ESSENTIAL HYPERTENSION: ICD-10-CM

## 2023-07-24 DIAGNOSIS — E78.5 DYSLIPIDEMIA: ICD-10-CM

## 2023-07-24 DIAGNOSIS — E11.9 TYPE 2 DIABETES MELLITUS WITHOUT COMPLICATION, WITHOUT LONG-TERM CURRENT USE OF INSULIN (H): ICD-10-CM

## 2023-07-24 DIAGNOSIS — R52 PAIN: ICD-10-CM

## 2023-07-24 DIAGNOSIS — I10 PRIMARY HYPERTENSION: ICD-10-CM

## 2023-07-24 RX ORDER — LOSARTAN POTASSIUM 100 MG/1
100 TABLET ORAL DAILY
Qty: 90 TABLET | Refills: 1 | Status: SHIPPED | OUTPATIENT
Start: 2023-07-24 | End: 2023-11-16

## 2023-07-24 RX ORDER — FENOFIBRATE 48 MG/1
48 TABLET, COATED ORAL DAILY
Qty: 90 TABLET | Refills: 1 | Status: SHIPPED | OUTPATIENT
Start: 2023-07-24 | End: 2023-11-16

## 2023-07-24 RX ORDER — AMLODIPINE BESYLATE 5 MG/1
5 TABLET ORAL DAILY
Qty: 90 TABLET | Refills: 1 | Status: SHIPPED | OUTPATIENT
Start: 2023-07-24 | End: 2023-11-16

## 2023-07-24 RX ORDER — METFORMIN HCL 500 MG
500 TABLET, EXTENDED RELEASE 24 HR ORAL
Qty: 90 TABLET | Refills: 1 | Status: SHIPPED | OUTPATIENT
Start: 2023-07-24 | End: 2023-11-16

## 2023-07-24 RX ORDER — HYDROCHLOROTHIAZIDE 25 MG/1
25 TABLET ORAL DAILY
Qty: 90 TABLET | Refills: 1 | Status: SHIPPED | OUTPATIENT
Start: 2023-07-24 | End: 2023-11-16

## 2023-07-24 RX ORDER — MELOXICAM 15 MG/1
TABLET ORAL
Qty: 60 TABLET | Refills: 2 | Status: SHIPPED | OUTPATIENT
Start: 2023-07-24 | End: 2023-11-16

## 2023-07-24 NOTE — TELEPHONE ENCOUNTER
Patient requesting refills of attached medications.  Also requesting refill of his Wellbutrin 100 mg standard release.  He takes this 1x daily in the AM.  This medication was prescribed to him while he was in a treatment facility ([facility] in [place], [place]).  He no longer sees this provider as it was a provider exclusive to the treatment program.    Any questions please call patient at [phone].  Okay to leave VM if needed.

## 2023-07-26 ENCOUNTER — TELEPHONE (OUTPATIENT)
Dept: FAMILY MEDICINE | Facility: CLINIC | Age: 63
End: 2023-07-26
Payer: COMMERCIAL

## 2023-07-26 NOTE — TELEPHONE ENCOUNTER
Medication is not current medication list or on history. Last note from July 2022 patient on sertraline for mental health.     Patient does have upcoming appointment on 8/25/23 please advise

## 2023-07-26 NOTE — TELEPHONE ENCOUNTER
Medication Question or Refill    Contacts         Type Contact Phone/Fax    07/26/2023 03:54 PM CDT Phone (Incoming) Oscar Mojica (Self) [phone] (M)            What medication are you calling about (include dose and sig)?:   Bupropion       Preferred Pharmacy:    [id number] - [place], [place] - [address] [facility] [address]  [address]  [place] [address]  Phone: [phone] Fax: [phone]      Controlled Substance Agreement on file:   CSA -- Patient Level:    CSA: None found at the patient level.       Who prescribed the medication?: Writer can not locate on medication list - unsure who prescribed - forwarding to Care Team.    Do you need a refill? Yes    When did you use the medication last? Per patient takes daily    Patient offered an appointment? No Already scheduled to see PCP 08/25/23    Do you have any questions or concerns?  No      Okay to leave a detailed message?: Yes at Cell number on file:    Telephone Information:   Mobile [phone]

## 2023-08-01 NOTE — TELEPHONE ENCOUNTER
Patient calling to state he needs his [employer] paperwork filled out by the provider.    Writer let patient know the provider is out till 8/24/2023    Patient has an appt on 8/25/2023    Patient is wondering if the covering provider could fill out the part to be filled by the pcp?    Or does he need to be seen really quick to have it filled out?    Call back patient ASAP     Contact Information  [phone] (Mobile)

## 2023-08-01 NOTE — TELEPHONE ENCOUNTER
Called patient and informed him that he needed to keep his appointment. Patient had no further questions at this time.

## 2023-08-01 NOTE — TELEPHONE ENCOUNTER
Patient needs to be seen to have paperwork completed, see 6/15/23 telephone encounter where PCP states visit required.  Patient has not been seen by any other [facility] provider recently, will need apt as scheduled.

## 2023-08-17 ENCOUNTER — TELEPHONE (OUTPATIENT)
Dept: FAMILY MEDICINE | Facility: CLINIC | Age: 63
End: 2023-08-17
Payer: COMMERCIAL

## 2023-08-17 NOTE — TELEPHONE ENCOUNTER
Pt dropped off [employer] forms for Denise to fill out. Once forms are completed, forms may be mailed to the address provided on the form. Copies are made and original is placed in mailbox.

## 2023-08-18 NOTE — TELEPHONE ENCOUNTER
Pt has an appt 8/25/23 with PCP. Will place forms on PCP desk to be signed     Thank you   BESSY Alvarado

## 2023-08-25 ENCOUNTER — OFFICE VISIT (OUTPATIENT)
Dept: FAMILY MEDICINE | Facility: CLINIC | Age: 63
End: 2023-08-25
Payer: COMMERCIAL

## 2023-08-25 VITALS
RESPIRATION RATE: 20 BRPM | TEMPERATURE: 97.4 F | WEIGHT: 203.6 LBS | SYSTOLIC BLOOD PRESSURE: 129 MMHG | OXYGEN SATURATION: 97 % | HEIGHT: 61 IN | DIASTOLIC BLOOD PRESSURE: 80 MMHG | HEART RATE: 76 BPM | BODY MASS INDEX: 38.44 KG/M2

## 2023-08-25 DIAGNOSIS — G89.29 CHRONIC PAIN OF LEFT KNEE: ICD-10-CM

## 2023-08-25 DIAGNOSIS — I20.1 CORONARY ARTERY VASOSPASM (H): ICD-10-CM

## 2023-08-25 DIAGNOSIS — E11.9 TYPE 2 DIABETES MELLITUS WITHOUT COMPLICATION, WITHOUT LONG-TERM CURRENT USE OF INSULIN (H): Primary | ICD-10-CM

## 2023-08-25 DIAGNOSIS — M25.562 CHRONIC PAIN OF LEFT KNEE: ICD-10-CM

## 2023-08-25 DIAGNOSIS — I10 PRIMARY HYPERTENSION: ICD-10-CM

## 2023-08-25 LAB
ALBUMIN SERPL BCG-MCNC: 4.5 G/DL (ref 3.5–5.2)
ALP SERPL-CCNC: 62 U/L (ref 40–129)
ALT SERPL W P-5'-P-CCNC: 17 U/L (ref 0–70)
ANION GAP SERPL CALCULATED.3IONS-SCNC: 11 MMOL/L (ref 7–15)
AST SERPL W P-5'-P-CCNC: 34 U/L (ref 0–45)
BILIRUB SERPL-MCNC: 0.3 MG/DL
BUN SERPL-MCNC: 11.7 MG/DL (ref 8–23)
CALCIUM SERPL-MCNC: 9.7 MG/DL (ref 8.8–10.2)
CHLORIDE SERPL-SCNC: 97 MMOL/L (ref 98–107)
CHOLEST SERPL-MCNC: 213 MG/DL
CREAT SERPL-MCNC: 1.08 MG/DL (ref 0.67–1.17)
CREAT UR-MCNC: 71.6 MG/DL
DEPRECATED HCO3 PLAS-SCNC: 28 MMOL/L (ref 22–29)
GFR SERPL CREATININE-BSD FRML MDRD: 78 ML/MIN/1.73M2
GLUCOSE SERPL-MCNC: 98 MG/DL (ref 70–99)
HBA1C MFR BLD: 6.8 % (ref 0–5.6)
HDLC SERPL-MCNC: 39 MG/DL
LDLC SERPL CALC-MCNC: 119 MG/DL
MICROALBUMIN UR-MCNC: <12 MG/L
MICROALBUMIN/CREAT UR: NORMAL MG/G{CREAT}
NONHDLC SERPL-MCNC: 174 MG/DL
POTASSIUM SERPL-SCNC: 4 MMOL/L (ref 3.4–5.3)
PROT SERPL-MCNC: 7.4 G/DL (ref 6.4–8.3)
SODIUM SERPL-SCNC: 136 MMOL/L (ref 136–145)
TRIGL SERPL-MCNC: 274 MG/DL

## 2023-08-25 PROCEDURE — 80053 COMPREHEN METABOLIC PANEL: CPT | Performed by: FAMILY MEDICINE

## 2023-08-25 PROCEDURE — 99215 OFFICE O/P EST HI 40 MIN: CPT | Performed by: FAMILY MEDICINE

## 2023-08-25 PROCEDURE — 36415 COLL VENOUS BLD VENIPUNCTURE: CPT | Performed by: FAMILY MEDICINE

## 2023-08-25 PROCEDURE — 80061 LIPID PANEL: CPT | Performed by: FAMILY MEDICINE

## 2023-08-25 PROCEDURE — 82570 ASSAY OF URINE CREATININE: CPT | Performed by: FAMILY MEDICINE

## 2023-08-25 PROCEDURE — 82043 UR ALBUMIN QUANTITATIVE: CPT | Performed by: FAMILY MEDICINE

## 2023-08-25 PROCEDURE — 83036 HEMOGLOBIN GLYCOSYLATED A1C: CPT | Performed by: FAMILY MEDICINE

## 2023-08-25 ASSESSMENT — PATIENT HEALTH QUESTIONNAIRE - PHQ9
10. IF YOU CHECKED OFF ANY PROBLEMS, HOW DIFFICULT HAVE THESE PROBLEMS MADE IT FOR YOU TO DO YOUR WORK, TAKE CARE OF THINGS AT HOME, OR GET ALONG WITH OTHER PEOPLE: SOMEWHAT DIFFICULT
SUM OF ALL RESPONSES TO PHQ QUESTIONS 1-9: 7
SUM OF ALL RESPONSES TO PHQ QUESTIONS 1-9: 7

## 2023-08-25 ASSESSMENT — PAIN SCALES - GENERAL: PAINLEVEL: EXTREME PAIN (8)

## 2023-08-25 ASSESSMENT — ENCOUNTER SYMPTOMS: BACK PAIN: 1

## 2023-08-25 NOTE — LETTER
August 25, 2023      Oscar Mojica  [address]  [place] [address]        To Whom It May Concern,     Oscar Mojica is a patient at this medical facility with a history of hypertension and diabetes.    He is prescribed appropriate diet and daily exercise to help with his medical condition.     If you have any questions please write or call.       Sincerely,        Cameron Walker MD

## 2023-08-25 NOTE — ASSESSMENT & PLAN NOTE
History of DJD, with progressive worsening    Plan:  Referral to orthopedics for further evaluation and management

## 2023-08-25 NOTE — PROGRESS NOTES
"  Assessment & Plan   Problem List Items Addressed This Visit          Nervous and Auditory    Chronic pain of left knee     History of DJD, with progressive worsening    Plan:  Referral to orthopedics for further evaluation and management         Relevant Orders    Orthopedic  Referral       Endocrine    Type 2 diabetes mellitus without complication, without long-term current use of insulin (H) - Primary     On metformin 500 mg daily     Labs need to be updated and reviewed         Relevant Orders    Adult Eye  Referral    Albumin Random Urine Quantitative with Creat Ratio    Hemoglobin A1c    Comprehensive metabolic panel    Lipid panel reflex to direct LDL Fasting       Circulatory    Hypertension     On losartan, amlodipine and hydrochlorothiazide, potassium tablets  In review of the chart all meds were refilled for 6 months as of last month  Normotensive, continue current plan of care         Coronary artery vasospasm (H)          I spent a total of 50 minutes on the day of the visit.   Time spent by me doing chart review, history and exam, documentation and further activities per the note           BMI:   Estimated body mass index is 38.47 kg/m  as calculated from the following:    Height as of this encounter: 1.549 m (5' 1\").    Weight as of this encounter: 92.4 kg (203 lb 9.6 oz).         Cameron Walker MD  [facility]    Rashel Moore is a 62 year old, presenting for the med check and follow-up of chronic disease.   He is having chronic pain of the left knee, diagnosed with degenerative joint disease few years ago by [facility] and would like referral back to the specialist.     He also has a [employer] mobility form which would like reviewed.     Meds managed by the primary care have been refilled as of last month, good for 6 months.             8/25/2023    11:23 AM   Additional Questions   Roomed by CHRISTOPH Sanchez   Accompanied by HUGO       History of " Present Illness       Back Pain:  He presents for follow up of back pain. Patient's back pain is a chronic problem.  Location of back pain:  Right lower back, left lower back and right middle of back  Description of back pain: stabbing and other  Back pain spreads: right thigh, left thigh and left knee    Since patient first noticed back pain, pain is: rapidly worsening  Does back pain interfere with his job:  Not applicable       Mental Health Follow-up:  Patient presents to follow-up on Depression.Patient's depression since last visit has been:  No change  The patient is having other symptoms associated with depression.      Any significant life events: grief or loss and health concerns  Patient is feeling anxious or having panic attacks.  Patient has no concerns about alcohol or drug use.    Diabetes:   He presents for follow up of diabetes.    He is not checking blood glucose.        He is concerned about blood sugar frequently over 200.   He is having blurry vision.  The patient has not had a diabetic eye exam in the last 12 months.          Hyperlipidemia:  He presents for follow up of hyperlipidemia.   He is taking medication to lower cholesterol. He is not having myalgia or other side effects to statin medications.    Hypertension: He presents for follow up of hypertension.  He does not check blood pressure  regularly outside of the clinic. Outside blood pressures have been over 140/90. He does not follow a low salt diet.     Reason for visit:  Med check lower back pain and knee pain    He eats 2-3 servings of fruits and vegetables daily.He consumes 5 sweetened beverage(s) daily.He exercises with enough effort to increase his heart rate 10 to 19 minutes per day.  He exercises with enough effort to increase his heart rate 5 days per week.   He is taking medications regularly.  Answers submitted by the patient for this visit:  Patient Health Questionnaire (Submitted on 8/25/2023)  If you checked off any  "problems, how difficult have these problems made it for you to do your work, take care of things at home, or get along with other people?: Somewhat difficult  PHQ9 TOTAL SCORE: 7               Review of Systems   Musculoskeletal:  Positive for back pain.            Objective    /80 (BP Location: Left arm, Patient Position: Sitting, Cuff Size: Adult Large)   Pulse 76   Temp 97.4  F (36.3  C) (Temporal)   Resp 20   Ht 1.549 m (5' 1\")   Wt 92.4 kg (203 lb 9.6 oz)   SpO2 97%   BMI 38.47 kg/m    Body mass index is 38.47 kg/m .      Physical Exam                         "

## 2023-08-25 NOTE — LETTER
August 29, 2023      [facility]  [address]  [place] [address]        Dear ,    We are writing to inform you of your test results.    Cholesterol is elevated with The 10-year ASCVD risk score (Marisa DK, et al., 2019) is: 43.7%, recommending statin (lipid lowering medication) to mitigate the risk.     Resulted Orders   Lipid panel reflex to direct LDL Fasting   Result Value Ref Range    Cholesterol 213 (H) <200 mg/dL    Triglycerides 274 (H) <150 mg/dL    Direct Measure HDL 39 (L) >=40 mg/dL    LDL Cholesterol Calculated 119 (H) <=100 mg/dL    Non HDL Cholesterol 174 (H) <130 mg/dL    Narrative    Cholesterol  Desirable:  <200 mg/dL    Triglycerides  Normal:  Less than 150 mg/dL  Borderline High:  150-199 mg/dL  High:  200-499 mg/dL  Very High:  Greater than or equal to 500 mg/dL    Direct Measure HDL  Female:  Greater than or equal to 50 mg/dL   Male:  Greater than or equal to 40 mg/dL    LDL Cholesterol  Desirable:  <100mg/dL  Above Desirable:  100-129 mg/dL   Borderline High:  130-159 mg/dL   High:  160-189 mg/dL   Very High:  >= 190 mg/dL    Non HDL Cholesterol  Desirable:  130 mg/dL  Above Desirable:  130-159 mg/dL  Borderline High:  160-189 mg/dL  High:  190-219 mg/dL  Very High:  Greater than or equal to 220 mg/dL   Comprehensive metabolic panel   Result Value Ref Range    Sodium 136 136 - 145 mmol/L    Potassium 4.0 3.4 - 5.3 mmol/L    Chloride 97 (L) 98 - 107 mmol/L    Carbon Dioxide (CO2) 28 22 - 29 mmol/L    Anion Gap 11 7 - 15 mmol/L    Urea Nitrogen 11.7 8.0 - 23.0 mg/dL    Creatinine 1.08 0.67 - 1.17 mg/dL    Calcium 9.7 8.8 - 10.2 mg/dL    Glucose 98 70 - 99 mg/dL    Alkaline Phosphatase 62 40 - 129 U/L    AST 34 0 - 45 U/L      Comment:      Reference intervals for this test were updated on 6/12/2023 to more accurately reflect our healthy population. There may be differences in the flagging of prior results with similar values performed with this method. Interpretation of  those prior results can be made in the context of the updated reference intervals.    ALT 17 0 - 70 U/L      Comment:      Reference intervals for this test were updated on 6/12/2023 to more accurately reflect our healthy population. There may be differences in the flagging of prior results with similar values performed with this method. Interpretation of those prior results can be made in the context of the updated reference intervals.      Protein Total 7.4 6.4 - 8.3 g/dL    Albumin 4.5 3.5 - 5.2 g/dL    Bilirubin Total 0.3 <=1.2 mg/dL    GFR Estimate 78 >60 mL/min/1.73m2   Hemoglobin A1c   Result Value Ref Range    Hemoglobin A1C 6.8 (H) 0.0 - 5.6 %      Comment:      Normal <5.7%   Prediabetes 5.7-6.4%    Diabetes 6.5% or higher     Note: Adopted from ADA consensus guidelines.   Albumin Random Urine Quantitative with Creat Ratio   Result Value Ref Range    Creatinine Urine mg/dL 71.6 mg/dL      Comment:      The reference ranges have not been established in urine creatinine. The results should be integrated into the clinical context for interpretation.    Albumin Urine mg/L <12.0 mg/L      Comment:      The reference ranges have not been established in urine albumin. The results should be integrated into the clinical context for interpretation.    Albumin Urine mg/g Cr        Comment:      Unable to calculate, urine albumin and/or urine creatinine is outside detectable limits.  Microalbuminuria is defined as an albumin:creatinine ratio of 17 to 299 for males and 25 to 299 for females. A ratio of albumin:creatinine of 300 or higher is indicative of overt proteinuria.  Due to biologic variability, positive results should be confirmed by a second, first-morning random or 24-hour timed urine specimen. If there is discrepancy, a third specimen is recommended. When 2 out of 3 results are in the microalbuminuria range, this is evidence for incipient nephropathy and warrants increased efforts at glucose control, blood  pressure control, and institution of therapy with an angiotensin-converting-enzyme (ACE) inhibitor (if the patient can tolerate it).         If you have any questions or concerns, please call the clinic at the number listed above.       Sincerely,      Cameron Walker MD

## 2023-08-25 NOTE — ASSESSMENT & PLAN NOTE
On losartan, amlodipine and hydrochlorothiazide, potassium tablets  In review of the chart all meds were refilled for 6 months as of last month  Normotensive, continue current plan of care

## 2023-08-30 ENCOUNTER — PRE VISIT (OUTPATIENT)
Dept: ORTHOPEDICS | Facility: CLINIC | Age: 63
End: 2023-08-30
Payer: COMMERCIAL

## 2023-08-30 NOTE — TELEPHONE ENCOUNTER
DIAGNOSIS: Chronic pain of left knee//[facility]/no imgs/ortho con    APPOINTMENT DATE: 9/18/23   NOTES STATUS DETAILS   OFFICE NOTE from other specialist [facility] 11/8/2016, 5/12/2017- Seven Richey PA-C Ortho    MEDICATION LIST Internal    XRAYS  & INJECTIONS (IMAGES & REPORTS) [facility]   10/21/2015, 11/1/2016 XR Left Knee      Records Requested     August 30, 2023 10:17 AM   [id number]   Facility  [facility] Tel [phone] Fax [phone]     Outcome Requested     9/11/23 8AM images received in PACS - [facility]

## 2023-09-06 ENCOUNTER — TELEPHONE (OUTPATIENT)
Dept: FAMILY MEDICINE | Facility: CLINIC | Age: 63
End: 2023-09-06
Payer: COMMERCIAL

## 2023-09-06 NOTE — TELEPHONE ENCOUNTER
Order/Referral Request    Who is requesting: Patient    Orders being requested: Chest XR    Reason service is needed/diagnosis: Per patient the Methadone Clinic is requesting patient has an Chest XR completed    Has this been discussed with Provider:  Unknown to Writer.  Last visit with PCP = 08/25/2023    Does patient have a preference on a Group/Provider/Facility? [facility]    Does patient have an appointment scheduled?: No    Where to send orders: Place orders within Epic    Okay to leave a detailed message?: Yes at Cell number on file:    Telephone Information:   Mobile [phone]

## 2023-09-07 NOTE — TELEPHONE ENCOUNTER
Please schedule a Video visit to discuss.   Other specialty clinics/ providers should be able to send the requisition directly.

## 2023-09-11 ENCOUNTER — LAB (OUTPATIENT)
Dept: FAMILY MEDICINE | Facility: CLINIC | Age: 63
End: 2023-09-11

## 2023-09-11 ENCOUNTER — VIRTUAL VISIT (OUTPATIENT)
Dept: FAMILY MEDICINE | Facility: CLINIC | Age: 63
End: 2023-09-11
Payer: COMMERCIAL

## 2023-09-11 DIAGNOSIS — Z12.11 SPECIAL SCREENING FOR MALIGNANT NEOPLASMS, COLON: ICD-10-CM

## 2023-09-11 DIAGNOSIS — G89.29 CHRONIC PAIN OF LEFT KNEE: ICD-10-CM

## 2023-09-11 DIAGNOSIS — E78.5 DYSLIPIDEMIA: ICD-10-CM

## 2023-09-11 DIAGNOSIS — R76.11 MANTOUX: POSITIVE: Primary | ICD-10-CM

## 2023-09-11 DIAGNOSIS — K21.9 GASTROESOPHAGEAL REFLUX DISEASE WITHOUT ESOPHAGITIS: ICD-10-CM

## 2023-09-11 DIAGNOSIS — M25.562 CHRONIC PAIN OF LEFT KNEE: ICD-10-CM

## 2023-09-11 DIAGNOSIS — E55.9 VITAMIN D DEFICIENCY: ICD-10-CM

## 2023-09-11 PROCEDURE — 99214 OFFICE O/P EST MOD 30 MIN: CPT | Mod: VID | Performed by: FAMILY MEDICINE

## 2023-09-11 RX ORDER — ATORVASTATIN CALCIUM 80 MG/1
80 TABLET, FILM COATED ORAL DAILY
Qty: 90 TABLET | Refills: 0 | Status: SHIPPED | OUTPATIENT
Start: 2023-09-11 | End: 2024-05-15

## 2023-09-11 NOTE — PROGRESS NOTES
"Oscar is a 63 year old who is being evaluated via a billable video visit.      How would you like to obtain your AVS? Mail a copy  If the video visit is dropped, the invitation should be resent by: Text to cell phone: [phone]  Will anyone else be joining your video visit? No          Assessment & Plan   Problem List Items Addressed This Visit          Nervous and Auditory    Chronic pain of left knee    Relevant Medications    diclofenac (VOLTAREN) 1 % topical gel       Endocrine    Dyslipidemia    Relevant Medications    atorvastatin (LIPITOR) 80 MG tablet     Other Visit Diagnoses       Mantoux: positive    -  Primary    Relevant Orders    XR Chest 2 Views    Vitamin D deficiency        Relevant Medications    vitamin D3 (CHOLECALCIFEROL) 125 MCG (5000 UT) tablet    Other Relevant Orders    Vitamin D Deficiency    Gastroesophageal reflux disease without esophagitis        Relevant Medications    omeprazole (PRILOSEC) 20 MG DR capsule    Special screening for malignant neoplasms, colon        Relevant Orders    COLOGUARD(EXACT SCIENCES)               I spent a total of 30 minutes on the day of the visit.   Time spent by me doing chart review, history and exam, documentation and further activities per the note       BMI:   Estimated body mass index is 38.47 kg/m  as calculated from the following:    Height as of 8/25/23: 1.549 m (5' 1\").    Weight as of 8/25/23: 92.4 kg (203 lb 9.6 oz).         Cameron Walker MD  [facility]    Subjective   Oscar is a 63 year old, presenting with the following concerns:  He needs a chest x-ray as requested by his methadone clinic for having a positive Mantoux test.  No prior history of active or latent TB.     He is also agreeable to taking a statin for dyslipidemia and would like the medication be sent to his preferred pharmacy along with a refill of his vitamin D, omeprazole and Voltaren gel.      He is also requesting Cologuard for colon cancer " screening.    Review of Systems         Objective           Vitals:  No vitals were obtained today due to virtual visit.    Physical Exam   GENERAL: alert and no distress                Video-Visit Details    Type of service:  Video Visit   Video Start Time: 5:16 PM  Video End Time:5:37 PM    Originating Location (pt. Location): Home    Distant Location (provider location):  On-site  Platform used for Video Visit: SendTask

## 2023-09-18 ENCOUNTER — TELEPHONE (OUTPATIENT)
Dept: FAMILY MEDICINE | Facility: CLINIC | Age: 63
End: 2023-09-18

## 2023-09-18 DIAGNOSIS — M25.562 LEFT KNEE PAIN, UNSPECIFIED CHRONICITY: Primary | ICD-10-CM

## 2023-09-18 NOTE — TELEPHONE ENCOUNTER
Left message that x-ray has been ordered and appears he is scheduled for this.  Also to return call as PCP may need more details on letter that patient is requesting.

## 2023-09-18 NOTE — TELEPHONE ENCOUNTER
FYI - Status Update    Who is Calling: patient    Update: Patient states he had VV with Dr. Walker on 09/11.  He is unable to schedule XR, states he needs the Chest XR done for his methadone medication.  Patient stresses the importance that this must be done before end of the month.    Also asking about letter from PCP for gym at [facility]    Does caller want a call/response back: Yes     Okay to leave a detailed message?: Yes at Cell number on file:    Telephone Information:   Mobile [phone]

## 2023-09-18 NOTE — TELEPHONE ENCOUNTER
the letter for GYM is  already written and signed. this was discussed with him during the last OV, which he can print a copy of the letter, from home . please print a copy and mail to the patient.

## 2023-09-20 ENCOUNTER — HOSPITAL ENCOUNTER (OUTPATIENT)
Dept: GENERAL RADIOLOGY | Facility: HOSPITAL | Age: 63
Discharge: HOME | End: 2023-09-20
Attending: FAMILY MEDICINE | Admitting: FAMILY MEDICINE
Payer: COMMERCIAL

## 2023-09-20 DIAGNOSIS — R76.11 MANTOUX: POSITIVE: ICD-10-CM

## 2023-09-20 PROCEDURE — 71046 X-RAY EXAM CHEST 2 VIEWS: CPT

## 2023-09-26 ENCOUNTER — TELEPHONE (OUTPATIENT)
Dept: NURSING | Facility: CLINIC | Age: 63
End: 2023-09-26
Payer: COMMERCIAL

## 2023-09-26 NOTE — LETTER
September 26, 2023      [facility]  [address]  [place] [address]        Dear Henrry,    We are writing to inform you of your test results.    {results letter list:[id number]}        If you have any questions or concerns, please call the clinic at the number listed above.       Sincerely,

## 2023-09-26 NOTE — TELEPHONE ENCOUNTER
Patient calling requesting a print out of his chest xray result which he needs for his methadone clinic.  Routing request to clinic staff to assist.  Brenda Taylor RN   09/26/23 9:37 AM  [facility] Nurse Advisor

## 2023-09-28 DIAGNOSIS — K59.00 CONSTIPATION, UNSPECIFIED CONSTIPATION TYPE: Primary | ICD-10-CM

## 2023-09-28 RX ORDER — POLYETHYLENE GLYCOL 3350 17 G/17G
17 POWDER, FOR SOLUTION ORAL
OUTPATIENT
Start: 2023-09-28

## 2023-09-28 NOTE — TELEPHONE ENCOUNTER
Reason for Call:  Medication refill:    Do you use a [facility]?  [place] of the pharmacy and phone number for the current request:  [facility] [address]     Name of the medication requested:     polyethylene glycol (MIRALAX) 17 GM/Dose powder     Last visit with PCP: 09/11/23    Can we leave a detailed message on this number? YES    Phone number patient can be reached at: Home number on file [phone] (home)    Best Time: any    Call taken on 9/28/2023 at 8:51 AM by Kaya Calderon

## 2023-10-01 ENCOUNTER — TRANSFERRED RECORDS (OUTPATIENT)
Dept: MULTI SPECIALTY CLINIC | Facility: CLINIC | Age: 63
End: 2023-10-01

## 2023-10-01 LAB — RETINOPATHY: NORMAL

## 2023-10-02 NOTE — TELEPHONE ENCOUNTER
Pt called back and does not want to pay for the Miralax over the counter, he wants his insurance to cover it. Per pt he has never had to pay for it.

## 2023-10-03 RX ORDER — POLYETHYLENE GLYCOL 3350 17 G/17G
17 POWDER, FOR SOLUTION ORAL DAILY PRN
Qty: 510 G | Refills: 1 | Status: SHIPPED | OUTPATIENT
Start: 2023-10-03

## 2023-10-09 ENCOUNTER — TELEPHONE (OUTPATIENT)
Dept: FAMILY MEDICINE | Facility: CLINIC | Age: 63
End: 2023-10-09
Payer: COMMERCIAL

## 2023-10-09 NOTE — TELEPHONE ENCOUNTER
[facility] calling to inquire the last medical record from the last appt     [facility]  Phone: [phone]  Fax: [phone]

## 2023-10-19 ENCOUNTER — OFFICE VISIT (OUTPATIENT)
Dept: ORTHOPEDICS | Facility: CLINIC | Age: 63
End: 2023-10-19
Payer: COMMERCIAL

## 2023-10-19 ENCOUNTER — ANCILLARY PROCEDURE (OUTPATIENT)
Dept: GENERAL RADIOLOGY | Facility: CLINIC | Age: 63
End: 2023-10-19
Attending: STUDENT IN AN ORGANIZED HEALTH CARE EDUCATION/TRAINING PROGRAM
Payer: COMMERCIAL

## 2023-10-19 ENCOUNTER — TELEPHONE (OUTPATIENT)
Dept: ORTHOPEDICS | Facility: CLINIC | Age: 63
End: 2023-10-19

## 2023-10-19 DIAGNOSIS — M17.12 PRIMARY OSTEOARTHRITIS OF LEFT KNEE: Primary | ICD-10-CM

## 2023-10-19 DIAGNOSIS — M25.562 CHRONIC PAIN OF LEFT KNEE: ICD-10-CM

## 2023-10-19 DIAGNOSIS — G89.29 CHRONIC PAIN OF LEFT KNEE: ICD-10-CM

## 2023-10-19 DIAGNOSIS — M25.562 LEFT KNEE PAIN, UNSPECIFIED CHRONICITY: ICD-10-CM

## 2023-10-19 PROCEDURE — 99213 OFFICE O/P EST LOW 20 MIN: CPT | Performed by: FAMILY MEDICINE

## 2023-10-19 PROCEDURE — 73562 X-RAY EXAM OF KNEE 3: CPT | Mod: LT | Performed by: RADIOLOGY
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
## 2023-10-19 NOTE — TELEPHONE ENCOUNTER
I returned the patient's phone call to let him know that Dr. Seaman did send the voltaren gel prescription to [facility] and that he should reach out to them to see when the medication is ready for . Patient understood, all questions were answered at this time. Liliana Hurley, ATC on 10/19/2023 at 3:48 PM

## 2023-10-19 NOTE — TELEPHONE ENCOUNTER
Pt just left his appt. Pt called, wondering when he can  his leg gel prescription.     Pt's PHARMACY:    [facility]  [address]   [place]    Please CALL pt to discuss.
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
## 2023-10-23 LAB — NONINV COLON CA DNA+OCC BLD SCRN STL QL: NEGATIVE

## 2023-10-24 PROBLEM — Z12.11 SPECIAL SCREENING FOR MALIGNANT NEOPLASMS, COLON: Status: ACTIVE | Noted: 2023-10-24

## 2023-11-09 ENCOUNTER — OFFICE VISIT (OUTPATIENT)
Dept: FAMILY MEDICINE | Facility: CLINIC | Age: 63
End: 2023-11-09
Payer: COMMERCIAL

## 2023-11-09 ENCOUNTER — TELEPHONE (OUTPATIENT)
Dept: FAMILY MEDICINE | Facility: CLINIC | Age: 63
End: 2023-11-09

## 2023-11-09 VITALS
SYSTOLIC BLOOD PRESSURE: 120 MMHG | BODY MASS INDEX: 35.87 KG/M2 | HEIGHT: 61 IN | RESPIRATION RATE: 16 BRPM | HEART RATE: 80 BPM | TEMPERATURE: 97.9 F | WEIGHT: 190 LBS | DIASTOLIC BLOOD PRESSURE: 58 MMHG | OXYGEN SATURATION: 95 %

## 2023-11-09 DIAGNOSIS — E11.9 TYPE 2 DIABETES MELLITUS WITHOUT COMPLICATION, WITHOUT LONG-TERM CURRENT USE OF INSULIN (H): ICD-10-CM

## 2023-11-09 DIAGNOSIS — R11.0 NAUSEA: Primary | ICD-10-CM

## 2023-11-09 DIAGNOSIS — G89.29 CHRONIC PAIN OF LEFT KNEE: ICD-10-CM

## 2023-11-09 DIAGNOSIS — M25.562 CHRONIC PAIN OF LEFT KNEE: ICD-10-CM

## 2023-11-09 PROCEDURE — 99214 OFFICE O/P EST MOD 30 MIN: CPT | Performed by: FAMILY MEDICINE

## 2023-11-09 RX ORDER — ONDANSETRON 4 MG/1
4 TABLET, ORALLY DISINTEGRATING ORAL EVERY 8 HOURS PRN
Qty: 30 TABLET | Refills: 0 | Status: SHIPPED | OUTPATIENT
Start: 2023-11-09

## 2023-11-09 ASSESSMENT — PATIENT HEALTH QUESTIONNAIRE - PHQ9
SUM OF ALL RESPONSES TO PHQ QUESTIONS 1-9: 7
10. IF YOU CHECKED OFF ANY PROBLEMS, HOW DIFFICULT HAVE THESE PROBLEMS MADE IT FOR YOU TO DO YOUR WORK, TAKE CARE OF THINGS AT HOME, OR GET ALONG WITH OTHER PEOPLE: NOT DIFFICULT AT ALL
SUM OF ALL RESPONSES TO PHQ QUESTIONS 1-9: 7

## 2023-11-09 ASSESSMENT — ENCOUNTER SYMPTOMS: NAUSEA: 1

## 2023-11-09 NOTE — TELEPHONE ENCOUNTER
"Patient was seen today in clinic and requested a new walker.    Patient was provided with DME order for walker and went upstairs.    Patient came back to  and told the  registrar that \"I went upstairs and they won't give me my walker!\"     came to the back and spoke with writer. Writer went to talk to patient and patient said to writer that he was told since he was already billed for one before he will need a cadi waiver and they did not give him a walker.\"    Writer tried calling upstairs but no answer so writer went upstairs to talk to the  registrar.    Registrar stated that since he was already billed for a walker in 2021. He is not able to get one today from them he will need to call in insurance and request for a cadi waiver. Once he gets the cadi waiver he will be able to get it at a different medical supply store but not at [facility].\"    Writer came back to relay that to the patient but the patient was gone. Writer also relayed the situation to the  registrar.    Patient came back couple minutes later and message was relayed to him to contact his insurance. He verbalized understanding.  "

## 2023-11-09 NOTE — PROGRESS NOTES
"  Assessment & Plan   Problem List Items Addressed This Visit       Type 2 diabetes mellitus without complication, without long-term current use of insulin (H)    Chronic pain of left knee    Relevant Orders    Walker Order for DME - ONLY FOR DME     Other Visit Diagnoses       Nausea    -  Primary    Tapering methadone    Plan:  Follow-up with the prescriber to discuss side effects    -Zofran as needed    Relevant Medications    ondansetron (ZOFRAN ODT) 4 MG ODT tab                      BMI:   Estimated body mass index is 35.9 kg/m  as calculated from the following:    Height as of this encounter: 1.549 m (5' 1\").    Weight as of this encounter: 86.2 kg (190 lb).           Cameron Walker MD  [facility] TIA Moore is a 63 year old, presenting for the following health issues:    Intermittent nausea    Over the past few days  Has been tapering methadone and not eating healthy for the past week   His appetite is also been diminished    -Chronic left knee pain  Need a walker with bigger wheels, (the walker patient is using now is someone else's and is breaking down),         11/9/2023     1:10 PM   Additional Questions   Roomed by Cely RUIZ CMA   Accompanied by N/A       History of Present Illness       Reason for visit:  Nausea  Symptom onset:  1-2 weeks ago  Symptoms include:  Nausea at night, loss of appetite  Symptom intensity:  Moderate  Symptom progression:  Staying the same  Had these symptoms before:  No  What makes it worse:  Eating certain foods like greasy restaurant foods, night time  What makes it better:  N/A        Review of Systems   Gastrointestinal:  Positive for nausea.            Objective    /58 (BP Location: Right arm, Patient Position: Sitting, Cuff Size: Adult Large)   Pulse 80   Temp 97.9  F (36.6  C) (Oral)   Resp 16   Ht 1.549 m (5' 1\")   Wt 86.2 kg (190 lb)   SpO2 95%   BMI 35.90 kg/m    Body mass index is 35.9 kg/m .    Physical Exam "

## 2023-11-09 NOTE — Clinical Note
Please abstract the following data from this visit with this patient into the appropriate field in Epic:  Tests that can be patient reported without a hard copy:  Eye exam with ophthalmology on this date: late part of October 23'  Exam Location: [facility] at [phone]  Other Tests found in the patient's chart through Chart Review/Care Everywhere:  {Abstract Quality List (Optional):701110}  Note to Abstraction: If this section is blank, no results were found via Chart Review/Care Everywhere.

## 2023-11-13 ENCOUNTER — TRANSFERRED RECORDS (OUTPATIENT)
Dept: HEALTH INFORMATION MANAGEMENT | Facility: CLINIC | Age: 63
End: 2023-11-13
Payer: COMMERCIAL

## 2023-11-15 ENCOUNTER — TELEPHONE (OUTPATIENT)
Dept: FAMILY MEDICINE | Facility: CLINIC | Age: 63
End: 2023-11-15
Payer: COMMERCIAL

## 2023-11-15 NOTE — TELEPHONE ENCOUNTER
Medication Question or Refill    Contacts         Type Contact Phone/Fax    11/15/2023 04:54 PM CST Phone (Incoming) Lore Mojicaheaven FORD (Self) [phone] (M)            What medication are you calling about (include dose and sig)?:     metFORMIN (GLUCOPHAGE XR) 500 MG 24 hr tablet   amLODIPine (NORVASC) 5 MG tablet   meloxicam (MOBIC) 15 MG tablet   losartan (COZAAR) 100 MG tablet   hydrochlorothiazide (HYDRODIURIL) 25 MG tablet   fenofibrate (TRICOR) 48 MG tablet       Preferred Pharmacy:    [id number] - [place], [place] - [address] AT [facility] [address]  [address]  [place] [address]  Phone: [phone] Fax: [phone]      Controlled Substance Agreement on file:   CSA -- Patient Level:    CSA: None found at the patient level.       Who prescribed the medication?: PCP    Do you need a refill? Yes [facility]     When did you use the medication last? Out of medication    Patient offered an appointment? No last visit with PCP = 11/09/2023  Do you have any questions or concerns?  Yes: [facility] to [facility] today and was told by Pharmacist that Dr. Walker cancelled all his prescriptions.  Advised patient that does not appear to be the case but will get message to PCP and Care Team urgently.      Patient is very concerned out of metformin and his BP medication.     Requesting call back.       Okay to leave a detailed message?: Yes at Cell number on file:    Telephone Information:   Mobile [phone]

## 2023-11-16 DIAGNOSIS — I10 PRIMARY HYPERTENSION: ICD-10-CM

## 2023-11-16 DIAGNOSIS — I10 ESSENTIAL HYPERTENSION: ICD-10-CM

## 2023-11-16 DIAGNOSIS — R52 PAIN: ICD-10-CM

## 2023-11-16 DIAGNOSIS — E11.9 TYPE 2 DIABETES MELLITUS WITHOUT COMPLICATION, WITHOUT LONG-TERM CURRENT USE OF INSULIN (H): ICD-10-CM

## 2023-11-16 DIAGNOSIS — E78.5 DYSLIPIDEMIA: ICD-10-CM

## 2023-11-16 RX ORDER — MELOXICAM 15 MG/1
15 TABLET ORAL DAILY PRN
Qty: 60 TABLET | Refills: 3 | Status: SHIPPED | OUTPATIENT
Start: 2023-11-16

## 2023-11-16 RX ORDER — AMLODIPINE BESYLATE 5 MG/1
5 TABLET ORAL DAILY
Qty: 90 TABLET | Refills: 1 | Status: SHIPPED | OUTPATIENT
Start: 2023-11-16 | End: 2024-05-15

## 2023-11-16 RX ORDER — HYDROCHLOROTHIAZIDE 25 MG/1
25 TABLET ORAL DAILY
Qty: 90 TABLET | Refills: 1 | Status: SHIPPED | OUTPATIENT
Start: 2023-11-16 | End: 2024-05-15

## 2023-11-16 RX ORDER — LOSARTAN POTASSIUM 100 MG/1
100 TABLET ORAL DAILY
Qty: 90 TABLET | Refills: 1 | Status: SHIPPED | OUTPATIENT
Start: 2023-11-16 | End: 2024-05-15

## 2023-11-16 RX ORDER — METFORMIN HCL 500 MG
500 TABLET, EXTENDED RELEASE 24 HR ORAL
Qty: 90 TABLET | Refills: 1 | Status: SHIPPED | OUTPATIENT
Start: 2023-11-16 | End: 2024-02-22

## 2023-11-16 RX ORDER — FENOFIBRATE 48 MG/1
48 TABLET, COATED ORAL DAILY
Qty: 90 TABLET | Refills: 1 | Status: SHIPPED | OUTPATIENT
Start: 2023-11-16 | End: 2024-05-15

## 2023-11-20 ENCOUNTER — DOCUMENTATION ONLY (OUTPATIENT)
Dept: FAMILY MEDICINE | Facility: CLINIC | Age: 63
End: 2023-11-20
Payer: COMMERCIAL

## 2023-11-20 ENCOUNTER — TELEPHONE (OUTPATIENT)
Dept: FAMILY MEDICINE | Facility: CLINIC | Age: 63
End: 2023-11-20
Payer: COMMERCIAL

## 2023-11-20 NOTE — PROGRESS NOTES
Form was received 11/20/23 and placed on PCPs desk to be signed.  Will fax when signed by PCP     Thank you   BESSY Alvarado

## 2023-12-06 ENCOUNTER — TRANSFERRED RECORDS (OUTPATIENT)
Dept: HEALTH INFORMATION MANAGEMENT | Facility: CLINIC | Age: 63
End: 2023-12-06
Payer: COMMERCIAL

## 2023-12-14 DIAGNOSIS — I10 ESSENTIAL HYPERTENSION: ICD-10-CM

## 2023-12-14 RX ORDER — POTASSIUM CHLORIDE 1500 MG/1
20 TABLET, EXTENDED RELEASE ORAL DAILY
Qty: 90 TABLET | Refills: 0 | Status: SHIPPED | OUTPATIENT
Start: 2023-12-14

## 2024-02-21 ENCOUNTER — TELEPHONE (OUTPATIENT)
Dept: FAMILY MEDICINE | Facility: CLINIC | Age: 64
End: 2024-02-21

## 2024-02-21 NOTE — TELEPHONE ENCOUNTER
Pt was at [facility] the other day and was some how scheduled for a ED follow up at [facility], the NP who saw him just called and told me that. Per NP, his blood sugar was over 500, he had been drinking Fireball so that could spike the blood sugar. She also was told by pt that he had stopped taking his Diabete medications, she asked if we could get him in to see his PCP, I did schedule him for tomorrow with his PCP. She stated she will send him to lab to get some blood work done for Dr. Walker to view tomorrow on Care Everywhere as well. She did advise him to start taking his Metformin again and to make sure he shows up for his appt tomorrow with his PCP.

## 2024-02-22 ENCOUNTER — OFFICE VISIT (OUTPATIENT)
Dept: FAMILY MEDICINE | Facility: CLINIC | Age: 64
End: 2024-02-22
Payer: COMMERCIAL

## 2024-02-22 VITALS
OXYGEN SATURATION: 98 % | SYSTOLIC BLOOD PRESSURE: 112 MMHG | HEIGHT: 61 IN | RESPIRATION RATE: 18 BRPM | BODY MASS INDEX: 29.27 KG/M2 | HEART RATE: 74 BPM | DIASTOLIC BLOOD PRESSURE: 70 MMHG | WEIGHT: 155 LBS

## 2024-02-22 DIAGNOSIS — M25.562 CHRONIC PAIN OF LEFT KNEE: ICD-10-CM

## 2024-02-22 DIAGNOSIS — I10 ESSENTIAL HYPERTENSION: ICD-10-CM

## 2024-02-22 DIAGNOSIS — G89.29 CHRONIC PAIN OF LEFT KNEE: ICD-10-CM

## 2024-02-22 DIAGNOSIS — Z74.09 IMPAIRED MOBILITY: ICD-10-CM

## 2024-02-22 DIAGNOSIS — E78.5 DYSLIPIDEMIA: ICD-10-CM

## 2024-02-22 DIAGNOSIS — E11.9 TYPE 2 DIABETES MELLITUS WITHOUT COMPLICATION, WITHOUT LONG-TERM CURRENT USE OF INSULIN (H): Primary | ICD-10-CM

## 2024-02-22 PROCEDURE — 99214 OFFICE O/P EST MOD 30 MIN: CPT | Performed by: FAMILY MEDICINE

## 2024-02-22 RX ORDER — LANCETS
EACH MISCELLANEOUS
Qty: 100 EACH | Refills: 6 | Status: SHIPPED | OUTPATIENT
Start: 2024-02-22

## 2024-02-22 RX ORDER — GLIPIZIDE 5 MG/1
5 TABLET, FILM COATED, EXTENDED RELEASE ORAL DAILY
COMMUNITY
Start: 2024-02-21 | End: 2024-05-15

## 2024-02-22 ASSESSMENT — PATIENT HEALTH QUESTIONNAIRE - PHQ9
10. IF YOU CHECKED OFF ANY PROBLEMS, HOW DIFFICULT HAVE THESE PROBLEMS MADE IT FOR YOU TO DO YOUR WORK, TAKE CARE OF THINGS AT HOME, OR GET ALONG WITH OTHER PEOPLE: NOT DIFFICULT AT ALL
SUM OF ALL RESPONSES TO PHQ QUESTIONS 1-9: 4
SUM OF ALL RESPONSES TO PHQ QUESTIONS 1-9: 4

## 2024-02-22 NOTE — ASSESSMENT & PLAN NOTE
On losartan, amlodipine and hydrochlorothiazide, potassium tablets, with no intolerance    Normotensive,   continue current plan of care

## 2024-02-22 NOTE — ASSESSMENT & PLAN NOTE
Not optimized     Plan:  Start Jardiance 10 mg daily  Continue on glipizide XL 5 mg daily    Monitor BG's, the goal to keep less than 200  Call if BG's are persistently above 200  Follow-up in person in 1 month to reassess

## 2024-02-22 NOTE — PROGRESS NOTES
"  Assessment & Plan   Problem List Items Addressed This Visit       Essential hypertension     On losartan, amlodipine and hydrochlorothiazide, potassium tablets, with no intolerance    Normotensive,   continue current plan of care         Dyslipidemia     Continue current statin and fenofibrate         Type 2 diabetes mellitus without complication, without long-term current use of insulin (H) - Primary     Not optimized     Plan:  Start Jardiance 10 mg daily  Continue on glipizide XL 5 mg daily    Monitor BG's, the goal to keep less than 200  Call if BG's are persistently above 200  Follow-up in person in 1 month to reassess         Relevant Medications    glipiZIDE (GLUCOTROL XL) 5 MG 24 hr tablet    blood glucose monitoring (NO BRAND SPECIFIED) meter device kit    blood glucose (NO BRAND SPECIFIED) test strip    thin (NO BRAND SPECIFIED) lancets    empagliflozin (JARDIANCE) 10 MG TABS tablet    Chronic pain of left knee    Relevant Orders    Walker Order for DME - ONLY FOR DME     Other Visit Diagnoses       Impaired mobility        Relevant Orders    Walker Order for DME - ONLY FOR DME                    BMI  Estimated body mass index is 29.29 kg/m  as calculated from the following:    Height as of this encounter: 1.549 m (5' 1\").    Weight as of this encounter: 70.3 kg (155 lb).             Subjective   Oscar is a 63 year old, presenting for med check and follow-up of:    Diabetes:   -Noncompliant to metformin due to intolerance, resulting in high blood sugar levels ending up in the ER with AMS  -In follow-up to the ER he was started on glipizide XL 5 mg.  -He has no blood glucose meter to monitor.    Impaired mobility with chronic pain of the left knee  Requesting a new walker with wheels, as his current one is breaking down.         2/22/2024     1:16 PM   Additional Questions   Roomed by Rony Cevallos   Accompanied by N/A     History of Present Illness       Diabetes:   He presents for follow up of diabetes.    He " "is not checking blood glucose.        He is concerned about blood sugar frequently over 200.   He is having excessive thirst, blurry vision and weight loss.            He eats 0-1 servings of fruits and vegetables daily.He consumes 2 sweetened beverage(s) daily.He exercises with enough effort to increase his heart rate 9 or less minutes per day.  He exercises with enough effort to increase his heart rate 3 or less days per week. He is missing 1 dose(s) of medications per week.  He is not taking prescribed medications regularly due to side effects and remembering to take.             Objective    /70 (BP Location: Right arm, Patient Position: Sitting, Cuff Size: Adult Regular)   Pulse 74   Resp 18   Ht 1.549 m (5' 1\")   Wt 70.3 kg (155 lb)   SpO2 98%   BMI 29.29 kg/m    Body mass index is 29.29 kg/m .    Physical Exam               Signed Electronically by: Cameron Walker MD    "

## 2024-03-18 ENCOUNTER — TELEPHONE (OUTPATIENT)
Dept: FAMILY MEDICINE | Facility: CLINIC | Age: 64
End: 2024-03-18
Payer: COMMERCIAL

## 2024-03-18 DIAGNOSIS — E11.9 TYPE 2 DIABETES MELLITUS WITHOUT COMPLICATION, WITHOUT LONG-TERM CURRENT USE OF INSULIN (H): ICD-10-CM

## 2024-03-18 NOTE — TELEPHONE ENCOUNTER
Medication Question or Refill    Contacts         Type Contact Phone/Fax    03/18/2024 01:34 PM CDT Phone (Incoming) Oscar Mojica (Self) [phone] (M)            What medication are you calling about (include dose and sig)?: Jardiance 10mg     Preferred Pharmacy:   [id number] - [place], [place] - [address] AT [facility] [address]  [address]  [facility] [address]  Phone: [phone] Fax: [phone]    [id number] - [place], [place] - [address] AT [facility] [address]  [address]  [place] [address]  Phone: [phone] Fax: [phone]      Controlled Substance Agreement on file:   CSA -- Patient Level:    CSA: None found at the patient level.       Who prescribed the medication?: PCP    Do you need a refill? Yes    When did you use the medication last? everyday    Patient offered an appointment? No    Do you have any questions or concerns?  Yes: pharmacy told pt. To contact PCP to get a refill. Pt. Only has 4 pills left      Okay to leave a detailed message?: Yes at Cell number on file:    Telephone Information:   Mobile [phone]

## 2024-05-15 ENCOUNTER — OFFICE VISIT (OUTPATIENT)
Dept: FAMILY MEDICINE | Facility: CLINIC | Age: 64
End: 2024-05-15
Payer: COMMERCIAL

## 2024-05-15 VITALS
DIASTOLIC BLOOD PRESSURE: 83 MMHG | BODY MASS INDEX: 29.49 KG/M2 | RESPIRATION RATE: 16 BRPM | OXYGEN SATURATION: 97 % | TEMPERATURE: 97.9 F | HEIGHT: 61 IN | HEART RATE: 77 BPM | WEIGHT: 156.2 LBS | SYSTOLIC BLOOD PRESSURE: 146 MMHG

## 2024-05-15 DIAGNOSIS — I10 PRIMARY HYPERTENSION: ICD-10-CM

## 2024-05-15 DIAGNOSIS — E78.5 DYSLIPIDEMIA: ICD-10-CM

## 2024-05-15 DIAGNOSIS — Z74.09 IMPAIRED MOBILITY: ICD-10-CM

## 2024-05-15 DIAGNOSIS — Z13.9 ENCOUNTER FOR SCREENING INVOLVING SOCIAL DETERMINANTS OF HEALTH (SDOH): ICD-10-CM

## 2024-05-15 DIAGNOSIS — Z00.00 ROUTINE GENERAL MEDICAL EXAMINATION AT A HEALTH CARE FACILITY: Primary | ICD-10-CM

## 2024-05-15 DIAGNOSIS — I10 ESSENTIAL HYPERTENSION: ICD-10-CM

## 2024-05-15 DIAGNOSIS — Z12.5 SCREENING FOR PROSTATE CANCER: ICD-10-CM

## 2024-05-15 DIAGNOSIS — G89.29 CHRONIC PAIN OF LEFT KNEE: ICD-10-CM

## 2024-05-15 DIAGNOSIS — M25.562 CHRONIC PAIN OF LEFT KNEE: ICD-10-CM

## 2024-05-15 DIAGNOSIS — F32.0 CURRENT MILD EPISODE OF MAJOR DEPRESSIVE DISORDER WITHOUT PRIOR EPISODE (H): ICD-10-CM

## 2024-05-15 DIAGNOSIS — M17.12 PRIMARY OSTEOARTHRITIS OF LEFT KNEE: ICD-10-CM

## 2024-05-15 DIAGNOSIS — E11.9 TYPE 2 DIABETES MELLITUS WITHOUT COMPLICATION, WITHOUT LONG-TERM CURRENT USE OF INSULIN (H): ICD-10-CM

## 2024-05-15 LAB — HBA1C MFR BLD: 7 % (ref 0–5.6)

## 2024-05-15 PROCEDURE — 36415 COLL VENOUS BLD VENIPUNCTURE: CPT | Performed by: FAMILY MEDICINE

## 2024-05-15 PROCEDURE — G0103 PSA SCREENING: HCPCS | Performed by: FAMILY MEDICINE

## 2024-05-15 PROCEDURE — 80061 LIPID PANEL: CPT | Performed by: FAMILY MEDICINE

## 2024-05-15 PROCEDURE — 91320 SARSCV2 VAC 30MCG TRS-SUC IM: CPT | Performed by: FAMILY MEDICINE

## 2024-05-15 PROCEDURE — 83036 HEMOGLOBIN GLYCOSYLATED A1C: CPT | Performed by: FAMILY MEDICINE

## 2024-05-15 PROCEDURE — 80053 COMPREHEN METABOLIC PANEL: CPT | Performed by: FAMILY MEDICINE

## 2024-05-15 PROCEDURE — 90480 ADMN SARSCOV2 VAC 1/ONLY CMP: CPT | Performed by: FAMILY MEDICINE

## 2024-05-15 PROCEDURE — 99215 OFFICE O/P EST HI 40 MIN: CPT | Mod: 25 | Performed by: FAMILY MEDICINE

## 2024-05-15 PROCEDURE — 99396 PREV VISIT EST AGE 40-64: CPT | Mod: 25 | Performed by: FAMILY MEDICINE

## 2024-05-15 RX ORDER — POTASSIUM CHLORIDE 1500 MG/1
20 TABLET, EXTENDED RELEASE ORAL DAILY
Qty: 90 TABLET | Refills: 1 | Status: CANCELLED | OUTPATIENT
Start: 2024-05-15

## 2024-05-15 SDOH — HEALTH STABILITY: PHYSICAL HEALTH: ON AVERAGE, HOW MANY MINUTES DO YOU ENGAGE IN EXERCISE AT THIS LEVEL?: 20 MIN

## 2024-05-15 SDOH — HEALTH STABILITY: PHYSICAL HEALTH: ON AVERAGE, HOW MANY DAYS PER WEEK DO YOU ENGAGE IN MODERATE TO STRENUOUS EXERCISE (LIKE A BRISK WALK)?: 4 DAYS

## 2024-05-15 ASSESSMENT — SOCIAL DETERMINANTS OF HEALTH (SDOH): HOW OFTEN DO YOU GET TOGETHER WITH FRIENDS OR RELATIVES?: THREE TIMES A WEEK

## 2024-05-15 ASSESSMENT — PAIN SCALES - GENERAL: PAINLEVEL: NO PAIN (0)

## 2024-05-15 NOTE — COMMUNITY RESOURCES LIST (ENGLISH)
May 15, 2024           YOUR PERSONALIZED LIST OF SERVICES & PROGRAMS           NAVIGATION    Eligibility Screening      [facility] [place] - SNAP (formerly food stamps) Screening and Application help  Phone: [phone]  Website: https://www.Kwicr.org/programs/[place]-food-helpline/  Language: English  Hours: Mon 10:00 AM - 5:00 PM Tue 10:00 AM - 5:00 PM Wed 10:00 AM - 5:00 PM Thu 10:00 AM - 5:00 PM Fri 10:00 AM - 5:00 PM  Fee: Free  Accessibility: Ada accessible, Blind accommodation, Deaf or hard of hearing, Translation services      [facility] HelpLine  Phone: [phone]  Website: https://www.Kwicr.org/find-help/  Language: English, Somali  Hours: Mon 10:00 AM - 5:00 PM Tue 10:00 AM - 5:00 PM Wed 10:00 AM - 5:00 PM Thu 10:00 AM - 5:00 PM Fri 10:00 AM - 5:00 PM  Fee: Free  Accessibility: Ada accessible, Blind accommodation, Deaf or hard of hearing, Translation services      [employer] - Certified Application Counselor (CAC)  Phone: [phone]  Website: https://www.[facility].org/about-us/assister-program/cacs/index.jsp  Language: English  Hours: Mon 8:00 AM - 4:00 PM Tue 8:00 AM - 4:00 PM Wed 8:00 AM - 4:00 PM Thu 8:00 AM - 4:00 PM        ASSISTANCE    Nutrition Benefits      [facility] [place] - SNAP (formerly food stamps) Screening and Application help  Phone: [phone]  Website: https://www.Kwicr.org/programs/[place]-food-helpline/  Language: English  Hours: Mon 10:00 AM - 5:00 PM Tue 10:00 AM - 5:00 PM Wed 10:00 AM - 5:00 PM Thu 10:00 AM - 5:00 PM Fri 10:00 AM - 5:00 PM  Fee: Free  Accessibility: Ada accessible, Blind accommodation, Deaf or hard of hearing, Translation services      [employer] [facility]  Phone: [phone]  Website: https://www.Kwicr.org/programs/market-bucks/  Language: English  Hours: Mon 10:00 AM - 5:00 PM Tue 10:00 AM - 5:00 PM Wed 10:00 AM - 5:00 PM Thu 10:00 AM - 5:00 PM Fri 10:00 AM  - 5:00 PM  Fee: Self pay      [facility] Food HelpLine  Phone: [phone]  Website: https://www.Enclarity.org/find-help/  Language: English, Malagasy  Hours: Mon 10:00 AM - 5:00 PM Tue 10:00 AM - 5:00 PM Wed 10:00 AM - 5:00 PM Thu 10:00 AM - 5:00 PM Fri 10:00 AM - 5:00 PM  Fee: Free  Accessibility: Ada accessible, Blind accommodation, Deaf or hard of hearing, Translation services    [facility] Food Shelf - Food pantry  [address] [place] [address] (Distance: 4.4 miles)  Phone: [phone]  Website: http://www.[employer]/  Language: English  Fee: Free      [facility] [place] - [employer] Shelf   Phone: [phone]  Website: https://www.Zaseorg/find-help/  Language: English  Hours: Mon 10:00 AM - 5:00 PM Tue 10:00 AM - 5:00 PM Wed 10:00 AM - 5:00 PM Thu 10:00 AM - 5:00 PM Fri 10:00 AM - 5:00 PM  Fee: Free  Accessibility: Ada accessible, Blind accommodation, Deaf or hard of hearing, Translation services      [employer] - [place] [employer] Food Shelf  Phone: [phone]  Website: www.Adioso.org  Language: English, Malagasy  Hours: Mon 9:00 AM - 3:30 PM Tue 9:00 AM - 6:30 PM Wed 9:00 AM - 3:30 PM Thu 9:00 AM - 12:30 PM Fri 9:00 AM - 12:30 PM Sat 9:00 AM - 12:00 PM  Fee: Free        & SHELTER    Case Management      Housing Online - https://Shanghai Yinku network/  [address] [address] (Distance: 11.7 miles)  Website: https://iRx Reminder.Hortonworks  Language: English      - Online housing search assistance  [address] [address] (Distance: 12.9 miles)  Phone: [phone]  Website: http://www.housinglink.org/  Language: English, Malagasy, Sierra Leonean, Hmong  Accessibility: [facility]      [employer] - Housing Stabilization Services  Phone: [phone] 629-7247  Website: https://Free-lance.ru.Hortonworks/  Language: English  Hours: Mon 8:00 AM -  4:00 PM Tue 8:00 AM - 4:00 PM Wed 8:00 AM - 4:00 PM Thu 8:00 AM - 4:00 PM Fri 8:00 AM - 4:00 PM  Fee: Free  Accessibility: Blind accommodation, Deaf or hard of hearing  Transportation Options: Free transportation    Payment Assistance      [employer]) - Rent and mortgage payment assistance  [address] [place] [address] (Distance: 2.8 miles)  Language: English, Slovak  Fee: Free  Accessibility: Ada accessible      [facility] - Security Deposit Assistance  [address] [place] [address] (Distance: 4.2 miles)  Phone: [phone]  Language: English  Fee: Free      [employer] - Keep the Key  Phone: (735) 730-8435  Website: https://www.bkg25-yrmaeogkeysxga.org/programs.html  Language: English  Hours: Mon 7:00 AM - 7:00 PM Tue 7:00 AM - 7:00 PM Wed 7:00 AM - 7:00 PM Thu 7:00 AM - 7:00 PM Fri 7:00 AM - 7:00 PM  Fee: Free    Mediation & Eviction Prevention      Living - Housing Stabilization Services  [address] [place] [address] (Distance: 12.5 miles)  Phone: [phone]  Website: https://Aubrey  Language: Spanish, English, Malagasy  Fee: Insurance, Self pay      [facility] - Mortgage Foreclosure Prevention  [address] [place] [address] (Distance: 7.9 miles)  Phone: [phone]  Language: English  Fee: Free  Accessibility: Ada accessible      Line - Tenant Rights / Eviction Prevention  Website: https://Fremont Centerlinemn.org/p-hzfx-vn-/  Language: English, Slovak            Medical Transportation, (NEMT)      [facility] Nurse Program - Transportation to medical appointments  [address] [place] [address] (Distance: 8.3 miles)  Phone: [phone]  Website: http://ourladyofpeScheurer Hospital.org/  Language: English, Slovak, Malagasy  Fee: Sliding scale  Accessibility: Translation services      [employer] - Transportation to medical appointments  [address]  [place] [address] (Distance: 4.2 miles)  Phone: [phone]  Website: https://www.RealMassive/  Language: English  Fee: Self pay, Insurance      Social Service [facility] - Neighbor to Neighbor Program  Phone: [phone]) [phone]  Email: alphonso@[facility]  Website: https://www.[facility].org/services/older-adults/-services/neighbor-to-neighbor  Language: English  Hours: Mon 8:00 AM - 5:00 PM Tue 8:00 AM - 5:00 PM Wed 8:00 AM - 5:00 PM Thu 8:00 AM - 5:00 PM Fri 8:00 AM - 5:00 PM  Fee: Insurance, Self pay  Accessibility: Deaf or hard of hearing, Blind accommodation, Translation services    Expense Assistance      [employer]it - [place] - Transit Assistance Program (TAP) - Transportation expense assistance  [address] [place] [address] (Distance: 4.3 miles)  Language: English, Gabonese  Fee: Free, Sliding scale, Self pay  Accessibility: Translation services      [place] - Transit Link  [address] [place] [address] (Distance: 4.3 miles)  Phone: [phone]  Website: https://Marblar/Transportation/Services/Transit-Link.aspx  Language: English  Fee: Self pay      - Dislocated Worker/Adult [employer] Employment Program  Phone: (825) [phone]  Email: lion@Double Fusionmn"[employer]  Website: https://Double Fusionmn.[employer]/services/employment-services/dislocated-worker-program/  Language: English, Australian  Hours: Mon 8:00 AM - 4:30 PM Tue 8:00 AM - 4:30 PM Wed 8:00 AM - 4:30 PM Thu 8:00 AM - 4:30 PM Fri 8:00 AM - 4:30 PM  Fee: Free  Accessibility: Ada accessible    Coordination      Mobility - Paratransit or Dial-A-Ride service  [address] [place] [address] (Distance: 4.3 miles)  Phone: [phone]  Website: http://metRevneticsobility.org  Language: English  Fee: Self pay  Accessibility: Translation services, [place] accessible      Transit - [place] - Transit Link  [address] [place] [address] (Distance: 4.3 miles)  Language: English  Fee: Self pay  Accessibility: Translation services      -  [facility]  Phone: [phone]  Website: http://www.Chinese Online               IMPORTANT NUMBERS & WEBSITES        Emergency Services  911  .   United Way  211 http://211unitedway.org  .   Poison Control  (348) 200-5751 http://mnpoison.org http://wisconsinpoison.org  .     Suicide and Crisis Lifeline  988 http://988Nuvyyoline.org  .   Childhelp [place] Child Abuse Hotline  196.408.2774 http://Childhelphotline.org   .   [place] Sexual Assault Hotline  (886) 434-3259 (HOPE) http://Whitevectorn.org   .     National Runaway Safeline  (773) 964-1528 (RUNAWAY) http://Spartan BioscienceruIndustry Dive.Datamolino  .   Pregnancy & Postpartum Support  Call/text [phone]  [place]: http://ppsupportmn.org  [place]: http://Theatro.com/wi  .   Substance Abuse National Helpline ([facility])  540-860-HELP (2217) http://Findtreatment.gov   .                DISCLAIMER: These resources have been generated via the [place] [employer] Platform. [place] [employer] does not endorse any service providers mentioned in this resource list. [place] does not guarantee that the services mentioned in this resource list will be available to you or will improve your health or wellness.    [facility]

## 2024-05-15 NOTE — PATIENT INSTRUCTIONS
"Preventive Care Advice   This is general advice we often give to help people stay healthy. Your care team may have specific advice just for you. Please talk to your care team about your own preventive care needs.  Lifestyle  Exercise at least 150 minutes each week (30 minutes a day, 5 days a week).  Do muscle strengthening activities 2 days a week. These help control your weight and prevent disease.  No smoking.  Wear sunscreen to prevent skin cancer.  Have your home tested for radon every 2 to 5 years. Radon is a colorless, odorless gas that can harm your lungs. To learn more, go to www.health.[facility].mn. and search for \"Radon in Homes.\"  Keep guns unloaded and locked up in a safe place like a safe or gun vault, or, use a gun lock and hide the keys. Always lock away bullets separately. To learn more, visit [employer].[place].gov and search for \"safe gun storage.\"  Nutrition  Eat 5 or more servings of fruits and vegetables each day.  Try wheat bread, brown rice and whole grain pasta (instead of white bread, rice, and pasta).  Get enough calcium and vitamin D. Check the label on foods and aim for 100% of the RDA (recommended daily allowance).  Regular exams  Have a dental exam and cleaning every 6 months.  See your health care team every year to talk about:  Any changes in your health.  Any medicines your care team has prescribed.  Preventive care, family planning, and ways to prevent chronic diseases.  Shots (vaccines)   HPV shots (up to age 26), if you've never had them before.  Hepatitis B shots (up to age 59), if you've never had them before.  COVID-19 shot: Get this shot when it's due.  Flu shot: Get a flu shot every year.  Tetanus shot: Get a tetanus shot every 10 years.  Pneumococcal, hepatitis A, and RSV shots: Ask your care team if you need these based on your risk.  Shingles shot (for age 50 and up).  General health tests  Diabetes screening:  Starting at age 35, Get screened for diabetes at least every 3 years.  If " you are younger than age 35, ask your care team if you should be screened for diabetes.  Cholesterol test: At age 39, start having a cholesterol test every 5 years, or more often if advised.  Bone density scan (DEXA): At age 50, ask your care team if you should have this scan for osteoporosis (brittle bones).  Hepatitis C: Get tested at least once in your life.  Abdominal aortic aneurysm screening: Talk to your doctor about having this screening if you:  Have ever smoked; and  Are biologically male; and  Are between the ages of 65 and 75.  STIs (sexually transmitted infections)  Before age 24: Ask your care team if you should be screened for STIs.  After age 24: Get screened for STIs if you're at risk. You are at risk for STIs (including HIV) if:  You are sexually active with more than one person.  You don't use condoms every time.  You or a partner was diagnosed with a sexually transmitted infection.  If you are at risk for HIV, ask about PrEP medicine to prevent HIV.  Get tested for HIV at least once in your life, whether you are at risk for HIV or not.  Cancer screening tests  Cervical cancer screening: If you have a cervix, begin getting regular cervical cancer screening tests at age 21. Most people who have regular screenings with normal results can stop after age 65. Talk about this with your provider.  Breast cancer scan (mammogram): If you've ever had breasts, begin having regular mammograms starting at age 40. This is a scan to check for breast cancer.  Colon cancer screening: It is important to start screening for colon cancer at age 45.  Have a colonoscopy test every 10 years (or more often if you're at risk) Or, ask your provider about stool tests like a FIT test every year or Cologuard test every 3 years.  To learn more about your testing options, visit: www.[email or web address].pdf.  For help making a decision, visit: [email or web address].  Prostate cancer screening test: If you have a prostate and are age 55  to 69, ask your provider if you would benefit from a yearly prostate cancer screening test.  Lung cancer screening: If you are a current or former smoker age 50 to 80, ask your care team if ongoing lung cancer screenings are right for you.  For informational purposes only. Not to replace the advice of your health care provider. Copyright   2023 [employer]. All rights reserved. Clinically reviewed by the [facility]itions [facility]. Kutuan 143090 - REV 04/24.    Preventing Falls: Care Instructions  Injuries and health problems such as trouble walking or poor eyesight can increase your risk of falling. So can some medicines. But there are things you can do to help prevent falls. You can exercise to get stronger. You can also arrange your home to make it safer.    Talk to your doctor about the medicines you take. Ask if any of them increase the risk of falls and whether they can be changed or stopped.   Try to exercise regularly. It can help improve your strength and balance. This can help lower your risk of falling.     Practice fall safety and prevention.    Wear low-heeled shoes that fit well and give your feet good support. Talk to your doctor if you have foot problems that make this hard.  Carry a cellphone or wear a medical alert device that you can use to call for help.  Use stepladders instead of chairs to reach high objects. Don't climb if you're at risk for falls. Ask for help, if needed.  Wear the correct eyeglasses, if you need them.    Make your home safer.    Remove rugs, cords, clutter, and furniture from walkways.  Keep your house well lit. Use night-lights in hallways and bathrooms.  Install and use sturdy handrails on stairways.  Wear nonskid footwear, even inside. Don't walk barefoot or in socks without shoes.    Be safe outside.    Use handrails, curb cuts, and ramps whenever possible.  Keep your hands free by using a shoulder bag or backpack.  Try to walk in well-lit  "areas. Watch out for uneven ground, changes in pavement, and debris.  Be careful in the winter. Walk on the grass or gravel when sidewalks are slippery. Use de-icer on steps and walkways. Add non-slip devices to shoes.    Put grab bars and nonskid mats in your shower or tub and near the toilet. Try to use a shower chair or bath bench when bathing.   Get into a tub or shower by putting in your weaker leg first. Get out with your strong side first. Have a phone or medical alert device in the bathroom with you.   Where can you learn more?  Go to https://www.[employer]/patiented  Enter G117 in the search box to learn more about \"Preventing Falls: Care Instructions.\"  Current as of: July 17, 2023               Content Version: 14.0    9201-5810 [employer].   Care instructions adapted under license by your healthcare professional. If you have questions about a medical condition or this instruction, always ask your healthcare professional. [employer] disclaims any warranty or liability for your use of this information.      Learning About Stress  What is stress?     Stress is your body's response to a hard situation. Your body can have a physical, emotional, or mental response. Stress is a fact of life for most people, and it affects everyone differently. What causes stress for you may not be stressful for someone else.  A lot of things can cause stress. You may feel stress when you go on a job interview, take a test, or run a race. This kind of short-term stress is normal and even useful. It can help you if you need to work hard or react quickly. For example, stress can help you finish an important job on time.  Long-term stress is caused by ongoing stressful situations or events. Examples of long-term stress include long-term health problems, ongoing problems at work, or conflicts in your family. Long-term stress can harm your health.  How does stress affect your health?  When you are " stressed, your body responds as though you are in danger. It makes hormones that speed up your heart, make you breathe faster, and give you a burst of energy. This is called the fight-or-flight stress response. If the stress is over quickly, your body goes back to normal and no harm is done.  But if stress happens too often or lasts too long, it can have bad effects. Long-term stress can make you more likely to get sick, and it can make symptoms of some diseases worse. If you tense up when you are stressed, you may develop neck, shoulder, or low back pain. Stress is linked to high blood pressure and heart disease.  Stress also harms your emotional health. It can make you dominguez, tense, or depressed. Your relationships may suffer, and you may not do well at work or school.  What can you do to manage stress?  You can try these things to help manage stress:   Do something active. Exercise or activity can help reduce stress. Walking is a great way to get started. Even everyday activities such as housecleaning or yard work can help.  Try yoga or jorge a chi. These techniques combine exercise and meditation. You may need some training at first to learn them.  Do something you enjoy. For example, listen to music or go to a movie. Practice your hobby or do volunteer work.  Meditate. This can help you relax, because you are not worrying about what happened before or what may happen in the future.  Do guided imagery. Imagine yourself in any setting that helps you feel calm. You can use online videos, books, or a teacher to guide you.  Do breathing exercises. For example:  From a standing position, bend forward from the waist with your knees slightly bent. Let your arms dangle close to the floor.  Breathe in slowly and deeply as you return to a standing position. Roll up slowly and lift your head last.  Hold your breath for just a few seconds in the standing position.  Breathe out slowly and bend forward from the waist.  Let your  "feelings out. Talk, laugh, cry, and express anger when you need to. Talking with supportive friends or family, a counselor, or a debi leader about your feelings is a healthy way to relieve stress. Avoid discussing your feelings with people who make you feel worse.  Write. It may help to write about things that are bothering you. This helps you find out how much stress you feel and what is causing it. When you know this, you can find better ways to cope.  What can you do to prevent stress?  You might try some of these things to help prevent stress:  Manage your time. This helps you find time to do the things you want and need to do.  Get enough sleep. Your body recovers from the stresses of the day while you are sleeping.  Get support. Your family, friends, and community can make a difference in how you experience stress.  Limit your news feed. Avoid or limit time on social media or news that may make you feel stressed.  Do something active. Exercise or activity can help reduce stress. Walking is a great way to get started.  Where can you learn more?  Go to https://www.[employer].net/patiented  Enter N032 in the search box to learn more about \"Learning About Stress.\"  Current as of: October 24, 2023               Content Version: 14.0    0776-4329 [employer].   Care instructions adapted under license by your healthcare professional. If you have questions about a medical condition or this instruction, always ask your healthcare professional. [employer] disclaims any warranty or liability for your use of this information.      Substance Use Disorder: Care Instructions  Overview     You can improve your life and health by stopping your use of alcohol or drugs. When you don't drink or use drugs, you may feel and sleep better. You may get along better with your family, friends, and coworkers. There are medicines and programs that can help with substance use disorder.  How can you care for " yourself at home?  Here are some ways to help you stay sober and prevent relapse.  If you have been given medicine to help keep you sober or reduce your cravings, be sure to take it exactly as prescribed.  Talk to your doctor about programs that can help you stop using drugs or drinking alcohol.  Do not keep alcohol or drugs in your home.  Plan ahead. Think about what you'll say if other people ask you to drink or use drugs. Try not to spend time with people who drink or use drugs.  Use the time and money spent on drinking or drugs to do something that's important to you.  Preventing a relapse  Have a plan to deal with relapse. Learn to recognize changes in your thinking that lead you to drink or use drugs. Get help before you start to drink or use drugs again.  Try to stay away from situations, friends, or places that may lead you to drink or use drugs.  If you feel the need to drink alcohol or use drugs again, seek help right away. Call a trusted friend or family member. Some people get support from organizations such as Narcotics Anonymous or "[employer] or from treatment facilities.  If you relapse, get help as soon as you can. Some people make a plan with another person that outlines what they want that person to do for them if they relapse. The plan usually includes how to handle the relapse and who to notify in case of relapse.  Don't give up. Remember that a relapse doesn't mean that you have failed. Use the experience to learn the triggers that lead you to drink or use drugs. Then quit again. Recovery is a lifelong process. Many people have several relapses before they are able to quit for good.  Follow-up care is a key part of your treatment and safety. Be sure to make and go to all appointments, and call your doctor if you are having problems. It's also a good idea to know your test results and keep a list of the medicines you take.  When should you call for help?   Call 911  anytime you think you may  "need emergency care. For example, call if you or someone else:    Has overdosed or has withdrawal signs. Be sure to tell the emergency workers that you are or someone else is using or trying to quit using drugs. Overdose or withdrawal signs may include:  Losing consciousness.  Seizure.  Seeing or hearing things that aren't there (hallucinations).     Is thinking or talking about suicide or harming others.   Where to get help 24 hours a day, 7 days a week   If you or someone you know talks about suicide, self-harm, a mental health crisis, a substance use crisis, or any other kind of emotional distress, get help right away. You can:    Call the Suicide and Crisis Lifeline at 470.     Call 8-005-523-TALK (1-292.337.6582).     Text HOME to 129823 to access the Crisis Text Line.   Consider saving these numbers in your phone.  Go to [employer] for more information or to chat online.  Call your doctor now or seek immediate medical care if:    You are having withdrawal symptoms. These may include nausea or vomiting, sweating, shakiness, and anxiety.   Watch closely for changes in your health, and be sure to contact your doctor if:    You have a relapse.     You need more help or support to stop.   Where can you learn more?  Go to https://www.[employer]lift.net/patiented  Enter H573 in the search box to learn more about \"Substance Use Disorder: Care Instructions.\"  Current as of: November 15, 2023               Content Version: 14.0    1609-3610 [employer].   Care instructions adapted under license by your healthcare professional. If you have questions about a medical condition or this instruction, always ask your healthcare professional. [employer] disclaims any warranty or liability for your use of this information.      Safer Sex: Care Instructions  Overview  Safer sex is a way to reduce your risk of getting a sexually transmitted infection (STI). It can also help prevent pregnancy.  Several " products can help you practice safer sex and reduce your chance of STIs. One of the best is a condom. There are internal and external condoms. You can use a special rubber sheet (dental dam) for protection during oral sex. Disposable gloves can keep your hands from touching blood, semen, or other body fluids that can carry infections.  Remember that birth control methods such as diaphragms, IUDs, foams, and birth control pills do not stop you from getting STIs.  Follow-up care is a key part of your treatment and safety. Be sure to make and go to all appointments, and call your doctor if you are having problems. It's also a good idea to know your test results and keep a list of the medicines you take.  How can you care for yourself at home?  Think about getting vaccinated to help prevent hepatitis A, hepatitis B, and human papillomavirus (HPV). They can be spread through sex.  Use a condom every time you have sex. Use an external condom, which goes on the penis. Or use an internal condom, which goes into the vagina or anus.  Make sure you use the right size external condom. A condom that's too small can break easily. A condom that's too big can slip off during sex.  Use a new condom each time you have sex. Be careful not to poke a hole in the condom when you open the wrapper.  Don't use an internal condom and an external condom at the same time.  Never use petroleum jelly (such as Vaseline), grease, hand lotion, baby oil, or anything with oil in it. These products can make holes in the condom.  After intercourse, hold the edge of the condom as you remove it. This will help keep semen from spilling out of the condom.  Do not have sex with anyone who has symptoms of an STI, such as sores on the genitals or mouth.  Do not drink a lot of alcohol or use drugs before sex.  Limit your sex partners. Sex with one partner who has sex only with you can reduce your risk of getting an STI.  Don't share sex toys. But if you do  "share them, use a condom and clean the sex toys between each use.  Talk to any partners before you have sex. Talk about what you feel comfortable with and whether you have any boundaries with sex. And find out if your partner or partners may be at risk for any STI. Keep in mind that a person may be able to spread an STI even if they do not have symptoms. You and any partners may want to get tested for STIs.  Where can you learn more?  Go to https://www.[employer].net/patiented  Enter B608 in the search box to learn more about \"Safer Sex: Care Instructions.\"  Current as of: November 27, 2023               Content Version: 14.0    6274-9825 [employer].   Care instructions adapted under license by your healthcare professional. If you have questions about a medical condition or this instruction, always ask your healthcare professional. [employer] disclaims any warranty or liability for your use of this information.      "

## 2024-05-15 NOTE — PROGRESS NOTES
Preventive Care Visit  [facility]  Cameron Walker MD, Family Medicine  May 15, 2024      Assessment & Plan   Problem List Items Addressed This Visit       Primary hypertension     On losartan, amlodipine and hydrochlorothiazide, potassium tablets, with no intolerance    Been out of blood pressure meds  Normal BMP/ potassium     Plan:  continue current plan of care  No potassium   Follow-up to reassess in 3 months         Relevant Medications    hydrochlorothiazide (HYDRODIURIL) 25 MG tablet    amLODIPine (NORVASC) 5 MG tablet    losartan (COZAAR) 100 MG tablet    Dyslipidemia     Ran out of the cholesterol meds for x3 months     Elevated cholesterol/LDL  Plan:  Resume atorvastatin 80 mg and fenofibrate daily and recheck labs in 3 months         Relevant Medications    fenofibrate (TRICOR) 48 MG tablet    atorvastatin (LIPITOR) 80 MG tablet    Other Relevant Orders    Lipid panel reflex to direct LDL Fasting (Completed)    Lipid panel reflex to direct LDL Fasting    ALT    Major depression     Hasn't taken the sertraline for the past few months   Would like to resume taking.          Relevant Medications    sertraline (ZOLOFT) 50 MG tablet    Type 2 diabetes mellitus without complication, without long-term current use of insulin (H)     On Jardiance 10 mg daily,  glipizide XL 5 mg daily( ran out 2 months ago )       Monitor BG's not been tested     Plan to keep < 150     Mildly elevated blood glucose and A1c within the satisfactory range    Plan:   Continue current meds           Relevant Medications    glipiZIDE (GLUCOTROL XL) 5 MG 24 hr tablet    Other Relevant Orders    Hemoglobin A1c (Completed)    Comprehensive metabolic panel (Completed)    Chronic pain of left knee     History of DJD, with progressive worsening  Previously referred and evaluated by sports medicine, recommending physical therapy and topical Voltaren gel    Requesting a brace and a walker         Relevant Medications  "   diclofenac (VOLTAREN) 1 % topical gel    Other Relevant Orders    Primary Care - Care Coordination Referral    Walker Order for DME - ONLY FOR DME    Impaired mobility    Relevant Orders    Ankle/Knee Bracing Supplies Order Knee Sleeve/Brace; Left; Closed    Screening for prostate cancer     JUDIE declined         Relevant Orders    PSA, screen (Completed)     Other Visit Diagnoses       Routine general medical examination at a health care facility    -  Primary    Primary osteoarthritis of left knee        Relevant Medications    diclofenac (VOLTAREN) 1 % topical gel    Other Relevant Orders    Walker Order for DME - ONLY FOR DME    Encounter for screening involving social determinants of health (SDoH)        Relevant Orders    Primary Care - Care Coordination Referral             Patient has been advised of split billing requirements and indicates understanding: Yes      40 minutes spent by me on the date of the encounter in addition to physicals, doing chart review, review of test results, patient visit, and documentation        BMI  Estimated body mass index is 29.51 kg/m  as calculated from the following:    Height as of this encounter: 1.549 m (5' 1\").    Weight as of this encounter: 70.9 kg (156 lb 3.2 oz).   Weight management plan: Discussed healthy diet and exercise guidelines    Counseling  Appropriate preventive services were discussed with this patient, including applicable screening as appropriate for fall prevention, nutrition, physical activity, Tobacco-use cessation, weight loss and cognition.  Checklist reviewing preventive services available has been given to the patient.  Reviewed patient's diet, addressing concerns and/or questions.           Rashel Moore is a 63 year old, presenting for the following:  Physical        5/15/2024     3:50 PM   Additional Questions   Roomed by CHRISTOPH Sanchez   Accompanied by HUGO       Health Care Directive  Patient does not have a Health Care Directive or " Living Will: Discussed advance care planning with patient; however, patient declined at this time.    [facility]  Med check and follow up on chronic diease               5/15/2024   General Health   How would you rate your overall physical health? (!) FAIR   Feel stress (tense, anxious, or unable to sleep) To some extent   (!) STRESS CONCERN      5/15/2024   Nutrition   Three or more servings of calcium each day? (!) NO   Diet: Regular (no restrictions)   How many servings of fruit and vegetables per day? (!) 2-3   How many sweetened beverages each day? 0-1         5/15/2024   Exercise   Days per week of moderate/strenous exercise 4 days   Average minutes spent exercising at this level 20 min         5/15/2024   Social Factors   Frequency of gathering with friends or relatives Three times a week   Worry food won't last until get money to buy more Yes   Food not last or not have enough money for food? Yes   Do you have housing?  Yes   Are you worried about losing your housing? Yes   Lack of transportation? Yes   Unable to get utilities (heat,electricity)? No   Want help with housing or utility concern? (!) YES   (!) FOOD SECURITY CONCERN PRESENT (!) TRANSPORTATION CONCERN PRESENT(!) HOUSING CONCERN PRESENT      5/15/2024   Fall Risk   Fallen 2 or more times in the past year? No   Trouble with walking or balance? Yes           5/15/2024   Dental   Dentist two times every year? Yes         5/15/2024   TB Screening   Were you born outside of the [place]? No         Today's PHQ-9 Score:       2/22/2024    12:46 PM   PHQ-9 SCORE   PHQ-9 Total Score MyChart 4 (Minimal depression)   PHQ-9 Total Score 4           5/15/2024   Substance Use   If I could quit smoking, I would Somewhat agree   I want to quit somking, worry about health affects Somewhat agree   Willing to make a plan to quit smoking Somewhat agree   Willing to cut down before quitting Somewhat agree   Alcohol more than 3/day or more than 7/wk No   Do you use any other  "substances recreationally? (!) COCAINE     Social History     Tobacco Use    Smoking status: Every Day     Current packs/day: 0.50     Average packs/day: 0.5 packs/day for 35.0 years (17.5 ttl pk-yrs)     Types: Cigarettes     Start date: 5/15/1989    Smokeless tobacco: Never   Vaping Use    Vaping status: Never Used   Substance Use Topics    Alcohol use: Yes     Comment: Alcoholic Drinks/day: 1/2 pint on 2/11/2020    Drug use: Yes     Types: \"Crack\" cocaine     Comment: Drug use: 1 week sober            5/15/2024   STI Screening   New sexual partner(s) since last STI/HIV test? (!) YES    Last PSA:   Prostate Specific Antigen Screen   Date Value Ref Range Status   05/15/2024 0.62 0.00 - 4.50 ng/mL Final   09/27/2019 1.3 0.0 - 3.5 ng/mL Final     ASCVD Risk   The 10-year ASCVD risk score (Marisa ECHEVARRIA, et al., 2019) is: 46%    Values used to calculate the score:      Age: 63 years      Sex: Male      Is Non- : Yes      Diabetic: Yes      Tobacco smoker: Yes      Systolic Blood Pressure: 146 mmHg      Is BP treated: Yes      HDL Cholesterol: 66 mg/dL      Total Cholesterol: 208 mg/dL           Reviewed and updated as needed this visit by Provider   Tobacco  Allergies  Meds  Problems  Med Hx  Surg Hx  Fam Hx                     Objective    Exam  BP (!) 146/83 (BP Location: Right arm, Patient Position: Sitting, Cuff Size: Adult Large)   Pulse 77   Temp 97.9  F (36.6  C) (Oral)   Resp 16   Ht 1.549 m (5' 1\")   Wt 70.9 kg (156 lb 3.2 oz)   SpO2 97%   BMI 29.51 kg/m     Estimated body mass index is 29.51 kg/m  as calculated from the following:    Height as of this encounter: 1.549 m (5' 1\").    Weight as of this encounter: 70.9 kg (156 lb 3.2 oz).    Physical Exam  GENERAL: alert and no distress  EYES: Eyes grossly normal to inspection, PERRL and conjunctivae and sclerae normal  HENT: oropharynx clear and oral mucous membranes moist  NECK: no adenopathy, no asymmetry, masses, or " scars  RESP: lungs clear to auscultation - no rales, rhonchi or wheezes  CV: regular rate and rhythm, normal S1 S2, no S3 or S4, no murmur, click or rub, no peripheral edema  ABDOMEN: soft, nontender, no hepatosplenomegaly, no masses and bowel sounds normal  MS: no gross musculoskeletal defects noted, no edema  SKIN: no suspicious lesions or rashes  NEURO: Normal strength and tone, mentation intact and speech normal  PSYCH: mentation appears normal, affect normal/bright  Diabetic foot exam: normal DP and PT pulses, no trophic changes or ulcerative lesions, and normal sensory exam      Signed Electronically by: Cameron Walker MD

## 2024-05-15 NOTE — ASSESSMENT & PLAN NOTE
History of DJD, with progressive worsening  Previously referred and evaluated by sports medicine, recommending physical therapy and topical Voltaren gel    Requesting a brace and a walker

## 2024-05-15 NOTE — ASSESSMENT & PLAN NOTE
On Jardiance 10 mg daily,  glipizide XL 5 mg daily( ran out 2 months ago )       Monitor BG's not been tested     Plan to keep < 150     Mildly elevated blood glucose and A1c within the satisfactory range    Plan:   Continue current meds

## 2024-05-15 NOTE — ASSESSMENT & PLAN NOTE
Ran out of the cholesterol meds for x3 months     Elevated cholesterol/LDL  Plan:  Resume atorvastatin 80 mg and fenofibrate daily and recheck labs in 3 months

## 2024-05-15 NOTE — ASSESSMENT & PLAN NOTE
On losartan, amlodipine and hydrochlorothiazide, potassium tablets, with no intolerance    Been out of blood pressure meds  Normal BMP/ potassium     Plan:  continue current plan of care  No potassium   Follow-up to reassess in 3 months

## 2024-05-16 DIAGNOSIS — F32.0 CURRENT MILD EPISODE OF MAJOR DEPRESSIVE DISORDER WITHOUT PRIOR EPISODE (H): ICD-10-CM

## 2024-05-16 DIAGNOSIS — E11.9 TYPE 2 DIABETES MELLITUS WITHOUT COMPLICATION, WITHOUT LONG-TERM CURRENT USE OF INSULIN (H): ICD-10-CM

## 2024-05-16 LAB
ALBUMIN SERPL BCG-MCNC: 4.4 G/DL (ref 3.5–5.2)
ALP SERPL-CCNC: 76 U/L (ref 40–150)
ALT SERPL W P-5'-P-CCNC: 13 U/L (ref 0–70)
ANION GAP SERPL CALCULATED.3IONS-SCNC: 12 MMOL/L (ref 7–15)
AST SERPL W P-5'-P-CCNC: 21 U/L (ref 0–45)
BILIRUB SERPL-MCNC: 0.3 MG/DL
BUN SERPL-MCNC: 19.8 MG/DL (ref 8–23)
CALCIUM SERPL-MCNC: 9.7 MG/DL (ref 8.8–10.2)
CHLORIDE SERPL-SCNC: 98 MMOL/L (ref 98–107)
CHOLEST SERPL-MCNC: 208 MG/DL
CREAT SERPL-MCNC: 1.03 MG/DL (ref 0.67–1.17)
DEPRECATED HCO3 PLAS-SCNC: 28 MMOL/L (ref 22–29)
EGFRCR SERPLBLD CKD-EPI 2021: 82 ML/MIN/1.73M2
FASTING STATUS PATIENT QL REPORTED: ABNORMAL
FASTING STATUS PATIENT QL REPORTED: ABNORMAL
GLUCOSE SERPL-MCNC: 103 MG/DL (ref 70–99)
HDLC SERPL-MCNC: 66 MG/DL
LDLC SERPL CALC-MCNC: 112 MG/DL
NONHDLC SERPL-MCNC: 142 MG/DL
POTASSIUM SERPL-SCNC: 4.2 MMOL/L (ref 3.4–5.3)
PROT SERPL-MCNC: 7.3 G/DL (ref 6.4–8.3)
PSA SERPL DL<=0.01 NG/ML-MCNC: 0.62 NG/ML (ref 0–4.5)
SODIUM SERPL-SCNC: 138 MMOL/L (ref 135–145)
TRIGL SERPL-MCNC: 150 MG/DL

## 2024-05-16 RX ORDER — EMPAGLIFLOZIN 10 MG/1
10 TABLET, FILM COATED ORAL DAILY
Qty: 30 TABLET | Refills: 0 | OUTPATIENT
Start: 2024-05-16

## 2024-05-16 NOTE — TELEPHONE ENCOUNTER
FAX [facility] 2nd Request Prescription Refill    empagliflozin (JARDIANCE) 10 MG TABS tablet 30 tablet 0 3/19/2024 -- No   Sig - Route: Take 1 tablet (10 mg) by mouth daily - Oral   Sent to pharmacy as: Empagliflozin 10 MG Oral Tablet (JARDIANCE)       sertraline (ZOLOFT) 50 MG tablet

## 2024-05-17 ENCOUNTER — PATIENT OUTREACH (OUTPATIENT)
Dept: CARE COORDINATION | Facility: CLINIC | Age: 64
End: 2024-05-17
Payer: COMMERCIAL

## 2024-05-17 RX ORDER — HYDROCHLOROTHIAZIDE 25 MG/1
25 TABLET ORAL DAILY
Qty: 90 TABLET | Refills: 1 | Status: SHIPPED | OUTPATIENT
Start: 2024-05-17

## 2024-05-17 RX ORDER — GLIPIZIDE 5 MG/1
5 TABLET, FILM COATED, EXTENDED RELEASE ORAL DAILY
Qty: 90 TABLET | Refills: 1 | Status: SHIPPED | OUTPATIENT
Start: 2024-05-17

## 2024-05-17 RX ORDER — AMLODIPINE BESYLATE 5 MG/1
5 TABLET ORAL DAILY
Qty: 90 TABLET | Refills: 1 | Status: SHIPPED | OUTPATIENT
Start: 2024-05-17

## 2024-05-17 RX ORDER — FENOFIBRATE 48 MG/1
48 TABLET, COATED ORAL DAILY
Qty: 90 TABLET | Refills: 0 | Status: SHIPPED | OUTPATIENT
Start: 2024-05-17

## 2024-05-17 RX ORDER — ATORVASTATIN CALCIUM 80 MG/1
80 TABLET, FILM COATED ORAL DAILY
Qty: 90 TABLET | Refills: 0 | Status: SHIPPED | OUTPATIENT
Start: 2024-05-17

## 2024-05-17 RX ORDER — LOSARTAN POTASSIUM 100 MG/1
100 TABLET ORAL DAILY
Qty: 90 TABLET | Refills: 1 | Status: SHIPPED | OUTPATIENT
Start: 2024-05-17

## 2024-05-17 NOTE — PROGRESS NOTES
Clinic Care Coordination Contact  [facility]/Voicemail    Clinical Data: Care Coordinator Outreach    Outreach Documentation Number of Outreach Attempt   5/17/2024   2:28 PM 1       Left message on patient's voicemail with call back information and requested return call.    Plan: Care Coordinator CHW to discuss recent CC referral placed by PCP  Care Coordinator will try to reach patient again in 1-2 business days.    Order Questions    Question Answer   Reason for Referral: Patient/Caregiver Support   Patient/Caregiver Suport: Home Safety    Resources for Support   Clinical Staff have discussed the Care Coordination Referral with the patient and/or caregiver: Yes     Neela VASQUEZ  Community Health Worker  [facility] Care Coordination  [email or web address]  [email or web address]  Office: [phone]

## 2024-05-20 NOTE — TELEPHONE ENCOUNTER
General Call    Contacts         Type Contact Phone/Fax    05/16/2024 09:27 AM CDT Interface (Incoming) [id number] - [place] - [address] [facility] [address] [phone]    05/16/2024 03:03 PM CDT Interface (Incoming) [id number] - [place], [place] - [address] AT [facility] [address]     05/20/2024 12:13 PM CDT Phone (Incoming) Oscar Mojica (Self) [phone] ()          Reason for Call:  RX Denial    What are your questions or concerns:  [facility] advised patient to contact clinic refill request for JARDIANCE was refused.  Per patient this is his diabetic medication and he needs this.     Date of last appointment with provider: 05/15/204    Okay to leave a detailed message?: Yes at Cell number on file:    Telephone Information:   Mobile [phone]

## 2024-05-22 ENCOUNTER — PATIENT OUTREACH (OUTPATIENT)
Dept: CARE COORDINATION | Facility: CLINIC | Age: 64
End: 2024-05-22
Payer: COMMERCIAL

## 2024-05-22 NOTE — PROGRESS NOTES
Clinic Care Coordination Contact  Community Health Worker Initial Outreach    CHW Initial Information Gathering:  Referral Source: PCP  Preferred Hospital: [facility]  [phone]  Preferred Urgent Care: [facility] [place], [phone]  Current living arrangement:: I live alone  Type of residence:: Apartment - handicap accessible  Informal Support system:: Friends, Neighbors  No PCP office visit in Past Year: No  Transportation means:: Regular car, Friend, Family  CHW Additional Questions  If ED/Hospital discharge, follow-up appointment scheduled as recommended?: N/A  Medication changes made following ED/Hospital discharge?: N/A  MyChart active?: No  Patient agreeable to assistance with activating MyChart?: No    Patient accepts CC: Yes. Patient scheduled for assessment with CC RN on Thursday 5/23/2024 at 1:00 pm. Patient noted desire to discuss recent SDOH/CC referral placed by PCP.     Food resources, Help with dietician referral  Order Questions     Question Answer   Reason for Referral: Patient/Caregiver Support   Patient/Caregiver Suport: Home Safety     Resources for Support   Clinical Staff have discussed the Care Coordination Referral with the patient and/or caregiver: Yes      Neela VASQUEZ  Community Health Worker  [facility] Care Coordination  [email or web address]  [email or web address]  Office: [phone]

## 2024-05-24 ENCOUNTER — PATIENT OUTREACH (OUTPATIENT)
Dept: NURSING | Facility: CLINIC | Age: 64
End: 2024-05-24
Payer: COMMERCIAL

## 2024-05-24 NOTE — PROGRESS NOTES
Clinic Care Coordination Contact  CCC RN spoke with patient. Discussed referral for Care Coordination. Patient stated he currently has a CAD  and [facility] Care Coordinator who assists him with his needs. He stated he currently is receiving delivered meals, has SNAP, has a PCA, has housekeeping and is going to an adult day program three days a week. Patient denied having any other needs or concerns. He was not enrolled in Care Coordination as he has good community support. Encouraged him to contact his current  and care coordinator for any additional needs or concerns.

## 2024-06-26 ENCOUNTER — TELEPHONE (OUTPATIENT)
Dept: FAMILY MEDICINE | Facility: CLINIC | Age: 64
End: 2024-06-26
Payer: COMMERCIAL

## 2024-06-26 NOTE — TELEPHONE ENCOUNTER
Call pt but couldn't reach or get a hold of pt. Trying to do an quality blood pressure check on pt.    Thank you,  [facility]

## 2024-07-31 ENCOUNTER — TELEPHONE (OUTPATIENT)
Dept: FAMILY MEDICINE | Facility: CLINIC | Age: 64
End: 2024-07-31
Payer: COMMERCIAL

## 2024-07-31 NOTE — TELEPHONE ENCOUNTER
Forms/Letter Request    Type of form/letter: [facility] Form    Do we have the form/letter: Unsure form faxed to  on 7/22/24    Who is the form from? [facility] (if other please explain)    Where did/will the form come from? form was faxed in    When is form/letter needed by: as soon as feasible.  Relayed to caller [place]. Requesting call back to confirm that form has been received.    How would you like the form/letter returned: Fax : [phone]    Patient Notified form requests are processed in 5-7 business days:Yes    Okay to leave a detailed message?: Yes at Other phone number:  [phone] call back number for Nay[facility]

## 2024-07-31 NOTE — TELEPHONE ENCOUNTER
Form has been received. Called Nayeli at [facility] regarding this, and let her know that PCP has been out and returns Monday.       Forms have been placed on PCP's desk for review upon return.       Pablo Magallanes Jr., CMA on 7/31/2024 at 12:19 PM

## 2024-09-12 ENCOUNTER — OFFICE VISIT (OUTPATIENT)
Dept: FAMILY MEDICINE | Facility: CLINIC | Age: 64
End: 2024-09-12
Payer: COMMERCIAL

## 2024-09-12 VITALS
OXYGEN SATURATION: 100 % | HEART RATE: 65 BPM | RESPIRATION RATE: 12 BRPM | BODY MASS INDEX: 30.38 KG/M2 | SYSTOLIC BLOOD PRESSURE: 173 MMHG | DIASTOLIC BLOOD PRESSURE: 78 MMHG | TEMPERATURE: 98.7 F | WEIGHT: 160.9 LBS | HEIGHT: 61 IN

## 2024-09-12 DIAGNOSIS — F79 MENTAL DISABILITY: Primary | ICD-10-CM

## 2024-09-12 DIAGNOSIS — Z74.09 IMPAIRED MOBILITY: ICD-10-CM

## 2024-09-12 PROCEDURE — 99213 OFFICE O/P EST LOW 20 MIN: CPT | Performed by: FAMILY MEDICINE

## 2024-09-12 PROCEDURE — G2211 COMPLEX E/M VISIT ADD ON: HCPCS | Performed by: FAMILY MEDICINE

## 2024-09-12 ASSESSMENT — PATIENT HEALTH QUESTIONNAIRE - PHQ9
SUM OF ALL RESPONSES TO PHQ QUESTIONS 1-9: 11
10. IF YOU CHECKED OFF ANY PROBLEMS, HOW DIFFICULT HAVE THESE PROBLEMS MADE IT FOR YOU TO DO YOUR WORK, TAKE CARE OF THINGS AT HOME, OR GET ALONG WITH OTHER PEOPLE: SOMEWHAT DIFFICULT
SUM OF ALL RESPONSES TO PHQ QUESTIONS 1-9: 11

## 2024-09-12 ASSESSMENT — PAIN SCALES - GENERAL: PAINLEVEL: NO PAIN (0)

## 2024-09-12 NOTE — PROGRESS NOTES
"  Assessment & Plan     Mental disability  Impaired mobility  Form reviewed and signed.     The longitudinal plan of care for the diagnosis(es)/condition(s) as documented were addressed during this visit. Due to the added complexity in care, I will continue to support Oscar in the subsequent management and with ongoing continuity of care.      I spent a total of 20 minutes on the day of the visit.   Time spent by me doing chart review, history and exam, documentation and further activities per the note      Nicotine/Tobacco Cessation  He reports that he has been smoking cigarettes. He started smoking about 35 years ago. He has a 17.7 pack-year smoking history. He has never used smokeless tobacco.  Nicotine/Tobacco Cessation Plan  Self help information given to patient      Depression Screening Follow Up        9/12/2024     8:54 AM   PHQ   PHQ-9 Total Score 11   Q9: Thoughts of better off dead/self-harm past 2 weeks Several days   F/U: Thoughts of suicide or self-harm Yes   F/U: Self harm-plan No   F/U: Self-harm action No   F/U: Safety concerns Yes     Patient attests to safety and intends of no self harm       Subjective   Oscar is a 64 year old, presenting for the following health issues:  Forms (Professional Statement of Need with housing assistance)        9/12/2024     8:54 AM   Additional Questions   Roomed by CHRISTOPH Sanchez   Accompanied by HUGO         9/12/2024   Forms   Any forms needing to be completed Yes        History of Present Illness       Reason for visit:  Form- Professional Statement of Need He is missing 1 dose(s) of medications per week.  He is not taking prescribed medications regularly due to remembering to take.                     Objective    BP (!) 173/78 (BP Location: Right arm, Patient Position: Sitting, Cuff Size: Adult Regular)   Pulse 65   Temp 98.7  F (37.1  C) (Oral)   Resp 12   Ht 1.549 m (5' 1\")   Wt 73 kg (160 lb 14.4 oz)   SpO2 100%   BMI 30.40 kg/m    Body mass index is " 30.4 kg/m .    Physical Exam   GENERAL: alert and no distress  PSYCH: mentation appears normal, affect normal            Signed Electronically by: Cameron Walker MD

## 2024-09-30 ENCOUNTER — OFFICE VISIT (OUTPATIENT)
Dept: FAMILY MEDICINE | Facility: CLINIC | Age: 64
End: 2024-09-30
Payer: COMMERCIAL

## 2024-09-30 VITALS
HEIGHT: 61 IN | TEMPERATURE: 98.6 F | OXYGEN SATURATION: 97 % | RESPIRATION RATE: 16 BRPM | WEIGHT: 159 LBS | BODY MASS INDEX: 30.02 KG/M2 | HEART RATE: 85 BPM | DIASTOLIC BLOOD PRESSURE: 92 MMHG | SYSTOLIC BLOOD PRESSURE: 156 MMHG

## 2024-09-30 DIAGNOSIS — Z72.0 TOBACCO ABUSE: ICD-10-CM

## 2024-09-30 DIAGNOSIS — I10 PRIMARY HYPERTENSION: ICD-10-CM

## 2024-09-30 DIAGNOSIS — I10 ESSENTIAL HYPERTENSION: ICD-10-CM

## 2024-09-30 DIAGNOSIS — E78.5 DYSLIPIDEMIA: ICD-10-CM

## 2024-09-30 DIAGNOSIS — E11.9 TYPE 2 DIABETES MELLITUS WITHOUT COMPLICATION, WITHOUT LONG-TERM CURRENT USE OF INSULIN (H): Primary | ICD-10-CM

## 2024-09-30 LAB
ALT SERPL W P-5'-P-CCNC: 14 U/L (ref 0–70)
ANION GAP SERPL CALCULATED.3IONS-SCNC: 11 MMOL/L (ref 7–15)
BUN SERPL-MCNC: 26.7 MG/DL (ref 8–23)
CALCIUM SERPL-MCNC: 9.5 MG/DL (ref 8.8–10.4)
CHLORIDE SERPL-SCNC: 98 MMOL/L (ref 98–107)
CHOLEST SERPL-MCNC: 148 MG/DL
CREAT SERPL-MCNC: 1.16 MG/DL (ref 0.67–1.17)
CREAT UR-MCNC: 59.2 MG/DL
EGFRCR SERPLBLD CKD-EPI 2021: 71 ML/MIN/1.73M2
FASTING STATUS PATIENT QL REPORTED: NO
FASTING STATUS PATIENT QL REPORTED: NO
GLUCOSE SERPL-MCNC: 80 MG/DL (ref 70–99)
HCO3 SERPL-SCNC: 28 MMOL/L (ref 22–29)
HDLC SERPL-MCNC: 60 MG/DL
LDLC SERPL CALC-MCNC: 74 MG/DL
MICROALBUMIN UR-MCNC: <12 MG/L
MICROALBUMIN/CREAT UR: NORMAL MG/G{CREAT}
NONHDLC SERPL-MCNC: 88 MG/DL
POTASSIUM SERPL-SCNC: 3.9 MMOL/L (ref 3.4–5.3)
SODIUM SERPL-SCNC: 137 MMOL/L (ref 135–145)
TRIGL SERPL-MCNC: 69 MG/DL

## 2024-09-30 PROCEDURE — 90471 IMMUNIZATION ADMIN: CPT | Performed by: FAMILY MEDICINE

## 2024-09-30 PROCEDURE — 36415 COLL VENOUS BLD VENIPUNCTURE: CPT | Performed by: FAMILY MEDICINE

## 2024-09-30 PROCEDURE — 90480 ADMN SARSCOV2 VAC 1/ONLY CMP: CPT | Performed by: FAMILY MEDICINE

## 2024-09-30 PROCEDURE — 84460 ALANINE AMINO (ALT) (SGPT): CPT | Performed by: FAMILY MEDICINE

## 2024-09-30 PROCEDURE — 80048 BASIC METABOLIC PNL TOTAL CA: CPT | Performed by: FAMILY MEDICINE

## 2024-09-30 PROCEDURE — 99214 OFFICE O/P EST MOD 30 MIN: CPT | Mod: 25 | Performed by: FAMILY MEDICINE

## 2024-09-30 PROCEDURE — 82043 UR ALBUMIN QUANTITATIVE: CPT | Performed by: FAMILY MEDICINE

## 2024-09-30 PROCEDURE — 91320 SARSCV2 VAC 30MCG TRS-SUC IM: CPT | Performed by: FAMILY MEDICINE

## 2024-09-30 PROCEDURE — 82570 ASSAY OF URINE CREATININE: CPT | Performed by: FAMILY MEDICINE

## 2024-09-30 PROCEDURE — 90673 RIV3 VACCINE NO PRESERV IM: CPT | Performed by: FAMILY MEDICINE

## 2024-09-30 PROCEDURE — 80061 LIPID PANEL: CPT | Performed by: FAMILY MEDICINE

## 2024-09-30 NOTE — LETTER
October 4, 2024      Oscar Mojica  [address]  [place] [address]        Dear ,    We are writing to inform you of your test results.    Your test results fall within the expected range(s) or remain unchanged from previous results.  Please continue with current treatment plan.    Resulted Orders   Lipid panel reflex to direct LDL Fasting   Result Value Ref Range    Cholesterol 148 <200 mg/dL    Triglycerides 69 <150 mg/dL    Direct Measure HDL 60 >=40 mg/dL    LDL Cholesterol Calculated 74 <100 mg/dL    Non HDL Cholesterol 88 <130 mg/dL    Patient Fasting > 8hrs? No     Narrative    Cholesterol  Desirable: < 200 mg/dL  Borderline High: 200 - 239 mg/dL  High: >= 240 mg/dL    Triglycerides  Normal: < 150 mg/dL  Borderline High: 150 - 199 mg/dL  High: 200-499 mg/dL  Very High: >= 500 mg/dL    Direct Measure HDL  Female: >= 50 mg/dL   Male: >= 40 mg/dL    LDL Cholesterol  Desirable: < 100 mg/dL  Above Desirable: 100 - 129 mg/dL   Borderline High: 130 - 159 mg/dL   High:  160 - 189 mg/dL   Very High: >= 190 mg/dL    Non HDL Cholesterol  Desirable: < 130 mg/dL  Above Desirable: 130 - 159 mg/dL  Borderline High: 160 - 189 mg/dL  High: 190 - 219 mg/dL  Very High: >= 220 mg/dL   ALT   Result Value Ref Range    ALT 14 0 - 70 U/L   Basic metabolic panel  (Ca, Cl, CO2, Creat, Gluc, K, Na, BUN)   Result Value Ref Range    Sodium 137 135 - 145 mmol/L    Potassium 3.9 3.4 - 5.3 mmol/L    Chloride 98 98 - 107 mmol/L    Carbon Dioxide (CO2) 28 22 - 29 mmol/L    Anion Gap 11 7 - 15 mmol/L    Urea Nitrogen 26.7 (H) 8.0 - 23.0 mg/dL    Creatinine 1.16 0.67 - 1.17 mg/dL    GFR Estimate 71 >60 mL/min/1.73m2      Comment:      eGFR calculated using 2021 CKD-EPI equation.    Calcium 9.5 8.8 - 10.4 mg/dL      Comment:      Reference intervals for this test were updated on 7/16/2024 to reflect our healthy population more accurately. There may be differences in the flagging of prior results with similar values performed  with this method. Those prior results can be interpreted in the context of the updated reference intervals.    Glucose 80 70 - 99 mg/dL    Patient Fasting > 8hrs? No    Albumin Random Urine Quantitative with Creat Ratio   Result Value Ref Range    Creatinine Urine mg/dL 59.2 mg/dL      Comment:      The reference ranges have not been established in urine creatinine. The results should be integrated into the clinical context for interpretation.    Albumin Urine mg/L <12.0 mg/L      Comment:      The reference ranges have not been established in urine albumin. The results should be integrated into the clinical context for interpretation.    Albumin Urine mg/g Cr        Comment:      Unable to calculate, urine albumin and/or urine creatinine is outside detectable limits.  Microalbuminuria is defined as an albumin:creatinine ratio of 17 to 299 for males and 25 to 299 for females. A ratio of albumin:creatinine of 300 or higher is indicative of overt proteinuria.  Due to biologic variability, positive results should be confirmed by a second, first-morning random or 24-hour timed urine specimen. If there is discrepancy, a third specimen is recommended. When 2 out of 3 results are in the microalbuminuria range, this is evidence for incipient nephropathy and warrants increased efforts at glucose control, blood pressure control, and institution of therapy with an angiotensin-converting-enzyme (ACE) inhibitor (if the patient can tolerate it).         If you have any questions or concerns, please call the clinic at the number listed above.       Sincerely,      Cameron Walker MD

## 2024-09-30 NOTE — PROGRESS NOTES
"  Assessment & Plan   Problem List Items Addressed This Visit       Primary hypertension    Dyslipidemia    Tobacco abuse    Type 2 diabetes mellitus without complication, without long-term current use of insulin (H) - Primary    Relevant Orders    Albumin Random Urine Quantitative with Creat Ratio    OPTOMETRY REFERRAL    Adult Diabetes Education  Referral      Elevated blood pressure today. He attests to not taking his blood pressure meds this morning yet.   Recent labs reviewed.     Continue current management   Follow up in 2 months.        The longitudinal plan of care for the diagnosis(es)/condition(s) as documented were addressed during this visit. Due to the added complexity in care, I will continue to support Oscar in the subsequent management and with ongoing continuity of care.      Subjective   Oscar is a 64 year old, with diabetes, HLD and hypertension comes into discuss review and fill out nutrition form.  Is also requesting referral to diabetic education .         9/30/2024     8:21 AM   Additional Questions   Roomed by Rony Cevallos   Accompanied by N/A         9/30/2024   Forms   Any forms needing to be completed Yes        History of Present Illness       Reason for visit:  Forms   He is taking medications regularly.                     Objective    BP (!) 156/92   Pulse 85   Temp 98.6  F (37  C) (Oral)   Resp 16   Ht 1.549 m (5' 1\")   Wt 72.1 kg (159 lb)   SpO2 97%   BMI 30.04 kg/m    Body mass index is 30.04 kg/m .    Physical Exam               Signed Electronically by: Cameron Walker MD    "

## 2024-10-14 ENCOUNTER — VIRTUAL VISIT (OUTPATIENT)
Dept: EDUCATION SERVICES | Facility: CLINIC | Age: 64
End: 2024-10-14
Attending: FAMILY MEDICINE
Payer: COMMERCIAL

## 2024-10-14 DIAGNOSIS — E11.9 TYPE 2 DIABETES MELLITUS WITHOUT COMPLICATION, WITHOUT LONG-TERM CURRENT USE OF INSULIN (H): Primary | ICD-10-CM

## 2024-10-14 PROCEDURE — G0108 DIAB MANAGE TRN  PER INDIV: HCPCS | Mod: 93 | Performed by: DIETITIAN, REGISTERED

## 2024-10-14 NOTE — LETTER
10/14/2024         RE: Oscar Mojica  [address]  [place] [address]        Dear Colleague,    Thank you for referring your patient, Oscar Mojica, to the [facility]. Please see a copy of my visit note below.    Diabetes Self-Management Education & Support    Presents for:      Type of Service: Telephone Visit      ASSESSMENT:    Initial diabetes education visit.   Previous diagnosis  Most recent A1C near optimal    Pt lives by himself.Has weekly PCA visits.   Self manages meds. Endorses occasional missed doses.   Denies any s/s of hypo or hyperglycemia    Shares that he has misplaced his glucose meter but he might still have the test strips and lancets - writer has mailed a sample meter (One Touch Verio Perfect) + test strips to patient, so he can start home monitoring his BG    Relies on [employer] meals and restaurant food. Pt wonders about what foods would help lower his cholesterol. We discussed high fiber-low fat diet and general diet guidelines. Also sent/emailed some patient education materials.    Likes to walk whenever he can. Also goes to adult day care 3x/wk      Education/Discussion: Reviewed diabetes basics, AIC and BS goals, sx and tx of hypo and hyperglycemia, complications of uncontrolled diabetes, general activity and diet guidelines, CHO foods, concept of budgeting and balancing, planning ahead for healthy meals, examples of quick and healthy meals/snacks, plate method for portion control, meter and testing basics: pt expressed understanding.    Discussed the need to eat healthily - mainly vegetables, some meat, limited rice/bread, noodles, exercise, drinking more water, sleep well       Patient's most recent   Lab Results   Component Value Date    A1C 7.0 05/15/2024     is not meeting goal of <7.0      PLAN    Continue with current therapy.  Start testing BG 1x/d and keep a BG log for review.  Eat 3 regular meals and not skip/delay - plan ahead and always keep  "some healthy snacks available     Topics to cover at upcoming visits: Healthy Eating, Monitoring, Taking Medication, Problem Solving, and Reducing Risks    Follow-up: pt states that he will call back later to schedule a follow up as/if needed  PCP:11/11  Instructed pt to bring his meter/BG log to all clinic visits     See Care Plan for co-developed, patient-state behavior change goals.  AVS provided for patient today.    Education Materials Provided:  Emailed with permission  Living Healthy with Diabetes, My Plate Planner, and CHO foods and serving sizes      SUBJECTIVE/OBJECTIVE:  Accompanied by: Self  Diabetes education in the past 24mo: No  Focus of Visit: Assistance w/ making life changes, Healthy Eating, Self-care behavioral goal setting  Disease course: Stable  Cultural Influences/Ethnic Background:  Not  or     Diabetes Symptoms & Complications:  Disease course: Stable       Patient Problem List and Family Medical History reviewed for relevant medical history, current medical status, and diabetes risk factors.    Vitals:  There were no vitals taken for this visit.  Estimated body mass index is 30.04 kg/m  as calculated from the following:    Height as of 9/30/24: 1.549 m (5' 1\").    Weight as of 9/30/24: 72.1 kg (159 lb).   Last 3 BP:   BP Readings from Last 3 Encounters:   09/30/24 (!) 156/92   09/12/24 (!) 173/78   05/15/24 (!) 146/83       History   Smoking Status     Every Day     Types: Cigarettes   Smokeless Tobacco     Never       Labs:  Lab Results   Component Value Date    A1C 7.0 05/15/2024     Lab Results   Component Value Date    GLC 80 09/30/2024     02/02/2021     Lab Results   Component Value Date    LDL 74 09/30/2024     Direct Measure HDL   Date Value Ref Range Status   09/30/2024 60 >=40 mg/dL Final   ]  GFR Estimate   Date Value Ref Range Status   09/30/2024 71 >60 mL/min/1.73m2 Final     Comment:     eGFR calculated using 2021 CKD-EPI equation.   02/02/2021 60 (L) >60 " "mL/min/1.73m2 Final     GFR Estimate If Black   Date Value Ref Range Status   02/02/2021 >60 >60 mL/min/1.73m2 Final     Lab Results   Component Value Date    CR 1.16 09/30/2024     No results found for: \"MICROALBUMIN\"    Healthy Eating:  Healthy Eating Assessed Today: Yes  Who cooks/prepares meals for you?: Other  Who purchases food in  your home?: Self, Other  How many times a week on average do you eat food made away from home (restaurant/take-out)?: 5+  Meals include: Breakfast, Dinner  Beverages: Water, Juice, Soda    Being Active:  Being Active Assessed Today: Yes  Exercise:: Currently not exercising    Monitoring:  Monitoring Assessed Today: Yes    ***    Taking Medications:  Diabetes Medication(s)       Sodium-Glucose Co-Transporter 2 (SGLT2) Inhibitors       empagliflozin (JARDIANCE) 10 MG TABS tablet Take 1 tablet (10 mg) by mouth daily       Sulfonylureas       glipiZIDE (GLUCOTROL XL) 5 MG 24 hr tablet Take 1 tablet (5 mg) by mouth daily            Taking Medication Assessed Today: Yes  Current Treatments: Diet, Oral Medication (taken by mouth)  Diabetes medication side effects?: No    Problem Solving:  Is the patient at risk for hypoglycemia?: Yes  Hypoglycemia Frequency: Never    Healthy Coping:  Stage of change: CONTEMPLATION (Considering change and yet undecided)  Patient Activation Measure Survey Score:       No data to display                Time Spent: {:939593} minutes  Encounter Type: Individual    Any diabetes medication dose changes were made via the CDE Protocol per the patient's {:[id number]}. A copy of this encounter was shared with the provider.       "

## 2024-10-14 NOTE — LETTER
10/14/2024         RE: Oscar Mojica  [address]  [place] [address]        Dear Colleague,    Thank you for referring your patient, Osacr Mojica, to the [facility]. Please see a copy of my visit note below.    Diabetes Self-Management Education & Support    Presents for:      Type of Service: Telephone Visit      ASSESSMENT:    Initial diabetes education visit.   Previous diagnosis  Most recent A1C near optimal  Dyslipidemia  HTN    Pt lives by himself.Has weekly PCA visits.   Self manages meds. Endorses occasional missed doses.   Denies any s/s of hypo or hyperglycemia    Shares that he has misplaced his glucose meter but he might still have the test strips and lancets - writer has mailed a sample meter (One Touch Verio Perfect) + test strips to patient, so he can start home monitoring his BG    Relies on [employer] meals and restaurant food. Pt wonders about what foods would help lower his cholesterol. We discussed high fiber-low fat diet and general diet guidelines. Also sent/emailed some patient education materials.    Likes to walk whenever he can. Also goes to adult day care 3x/wk      Education/Discussion: Reviewed diabetes basics, AIC and BS goals, sx and tx of hypo and hyperglycemia, complications of uncontrolled diabetes, general activity and diet guidelines, CHO foods, concept of budgeting and balancing, planning ahead for healthy meals, examples of quick and healthy meals/snacks, plate method for portion control, meter and testing basics: pt expressed understanding.    Discussed the need to eat healthily - mainly vegetables, some meat, limited rice/bread, noodles, exercise, drinking more water, sleep well       Patient's most recent   Lab Results   Component Value Date    A1C 7.0 05/15/2024     is not meeting goal of <7.0      PLAN    Continue with current therapy.  Start testing BG 1x/d and keep a BG log for review.  Eat 3 regular meals and not skip/delay - plan ahead  "and always keep some healthy snacks available     Topics to cover at upcoming visits: Healthy Eating, Monitoring, Taking Medication, Problem Solving, and Reducing Risks    Follow-up: pt states that he will call back later to schedule a follow up as/if needed  PCP:11/11  Instructed pt to bring his meter/BG log to all clinic visits     See Care Plan for co-developed, patient-state behavior change goals.  AVS provided for patient today.    Education Materials Provided:  Emailed with permission  Living Healthy with Diabetes, My Plate Planner, and CHO foods and serving sizes      SUBJECTIVE/OBJECTIVE:  Accompanied by: Self  Diabetes education in the past 24mo: No  Focus of Visit: Assistance w/ making life changes, Healthy Eating, Self-care behavioral goal setting  Disease course: Stable  Cultural Influences/Ethnic Background:  Not  or     Diabetes Symptoms & Complications:  Disease course: Stable       Patient Problem List and Family Medical History reviewed for relevant medical history, current medical status, and diabetes risk factors.    Vitals:  There were no vitals taken for this visit.  Estimated body mass index is 30.04 kg/m  as calculated from the following:    Height as of 9/30/24: 1.549 m (5' 1\").    Weight as of 9/30/24: 72.1 kg (159 lb).   Last 3 BP:   BP Readings from Last 3 Encounters:   09/30/24 (!) 156/92   09/12/24 (!) 173/78   05/15/24 (!) 146/83       History   Smoking Status    Every Day    Types: Cigarettes   Smokeless Tobacco    Never       Labs:  Lab Results   Component Value Date    A1C 7.0 05/15/2024     Lab Results   Component Value Date    GLC 80 09/30/2024     02/02/2021     Lab Results   Component Value Date    LDL 74 09/30/2024     Direct Measure HDL   Date Value Ref Range Status   09/30/2024 60 >=40 mg/dL Final   ]  GFR Estimate   Date Value Ref Range Status   09/30/2024 71 >60 mL/min/1.73m2 Final     Comment:     eGFR calculated using 2021 CKD-EPI equation. " "  02/02/2021 60 (L) >60 mL/min/1.73m2 Final     GFR Estimate If Black   Date Value Ref Range Status   02/02/2021 >60 >60 mL/min/1.73m2 Final     Lab Results   Component Value Date    CR 1.16 09/30/2024     No results found for: \"MICROALBUMIN\"    Healthy Eating:  Healthy Eating Assessed Today: Yes  Who cooks/prepares meals for you?: Other  Who purchases food in  your home?: Self, Other  How many times a week on average do you eat food made away from home (restaurant/take-out)?: 5+  Meals include: Breakfast, Dinner  Beverages: Water, Juice, Soda    Being Active:  Being Active Assessed Today: Yes  Exercise:: Currently not exercising    Monitoring:  Monitoring Assessed Today: Yes      Taking Medications:  Diabetes Medication(s)       Sodium-Glucose Co-Transporter 2 (SGLT2) Inhibitors       empagliflozin (JARDIANCE) 10 MG TABS tablet Take 1 tablet (10 mg) by mouth daily       Sulfonylureas       glipiZIDE (GLUCOTROL XL) 5 MG 24 hr tablet Take 1 tablet (5 mg) by mouth daily            Taking Medication Assessed Today: Yes  Current Treatments: Diet, Oral Medication (taken by mouth)  Diabetes medication side effects?: No    Problem Solving:  Is the patient at risk for hypoglycemia?: Yes  Hypoglycemia Frequency: Never    Healthy Coping:  Stage of change: CONTEMPLATION (Considering change and yet undecided)  Patient Activation Measure Survey Score:       No data to display                Time Spent: 60 minutes  Encounter Type: Individual    Any diabetes medication dose changes were made via the CDE Protocol per the patient's referring provider. A copy of this encounter was shared with the provider.       "

## 2024-10-14 NOTE — PROGRESS NOTES
Diabetes Self-Management Education & Support    Presents for:      Type of Service: Telephone Visit      ASSESSMENT:    Initial diabetes education visit.   Previous diagnosis  Most recent A1C near optimal  Dyslipidemia  HTN    Pt lives by himself.Has weekly PCA visits.   Self manages meds. Endorses occasional missed doses.   Denies any s/s of hypo or hyperglycemia    Shares that he has misplaced his glucose meter but he might still have the test strips and lancets - writer has mailed a sample meter (One Touch Verio Perfect) + test strips to patient, so he can start home monitoring his BG    Relies on [employer] meals and restaurant food. Pt wonders about what foods would help lower his cholesterol. We discussed high fiber-low fat diet and general diet guidelines. Also sent/emailed some patient education materials.    Likes to walk whenever he can. Also goes to adult day care 3x/wk      Education/Discussion: Reviewed diabetes basics, AIC and BS goals, sx and tx of hypo and hyperglycemia, complications of uncontrolled diabetes, general activity and diet guidelines, CHO foods, concept of budgeting and balancing, planning ahead for healthy meals, examples of quick and healthy meals/snacks, plate method for portion control, meter and testing basics: pt expressed understanding.    Discussed the need to eat healthily - mainly vegetables, some meat, limited rice/bread, noodles, exercise, drinking more water, sleep well       Patient's most recent   Lab Results   Component Value Date    A1C 7.0 05/15/2024     is not meeting goal of <7.0      PLAN    Continue with current therapy.  Start testing BG 1x/d and keep a BG log for review.  Eat 3 regular meals and not skip/delay - plan ahead and always keep some healthy snacks available     Topics to cover at upcoming visits: Healthy Eating, Monitoring, Taking Medication, Problem Solving, and Reducing Risks    Follow-up: pt states that he will call back later to schedule a follow up as/if  "needed  PCP:11/11  Instructed pt to bring his meter/BG log to all clinic visits     See Care Plan for co-developed, patient-state behavior change goals.  AVS provided for patient today.    Education Materials Provided:  Emailed with permission  Living Healthy with Diabetes, My Plate Planner, and CHO foods and serving sizes      SUBJECTIVE/OBJECTIVE:  Accompanied by: Self  Diabetes education in the past 24mo: No  Focus of Visit: Assistance w/ making life changes, Healthy Eating, Self-care behavioral goal setting  Disease course: Stable  Cultural Influences/Ethnic Background:  Not  or     Diabetes Symptoms & Complications:  Disease course: Stable       Patient Problem List and Family Medical History reviewed for relevant medical history, current medical status, and diabetes risk factors.    Vitals:  There were no vitals taken for this visit.  Estimated body mass index is 30.04 kg/m  as calculated from the following:    Height as of 9/30/24: 1.549 m (5' 1\").    Weight as of 9/30/24: 72.1 kg (159 lb).   Last 3 BP:   BP Readings from Last 3 Encounters:   09/30/24 (!) 156/92   09/12/24 (!) 173/78   05/15/24 (!) 146/83       History   Smoking Status    Every Day    Types: Cigarettes   Smokeless Tobacco    Never       Labs:  Lab Results   Component Value Date    A1C 7.0 05/15/2024     Lab Results   Component Value Date    GLC 80 09/30/2024     02/02/2021     Lab Results   Component Value Date    LDL 74 09/30/2024     Direct Measure HDL   Date Value Ref Range Status   09/30/2024 60 >=40 mg/dL Final   ]  GFR Estimate   Date Value Ref Range Status   09/30/2024 71 >60 mL/min/1.73m2 Final     Comment:     eGFR calculated using 2021 CKD-EPI equation.   02/02/2021 60 (L) >60 mL/min/1.73m2 Final     GFR Estimate If Black   Date Value Ref Range Status   02/02/2021 >60 >60 mL/min/1.73m2 Final     Lab Results   Component Value Date    CR 1.16 09/30/2024     No results found for: \"MICROALBUMIN\"    Healthy " Eating:  Healthy Eating Assessed Today: Yes  Who cooks/prepares meals for you?: Other  Who purchases food in  your home?: Self, Other  How many times a week on average do you eat food made away from home (restaurant/take-out)?: 5+  Meals include: Breakfast, Dinner  Beverages: Water, Juice, Soda    Being Active:  Being Active Assessed Today: Yes  Exercise:: Currently not exercising    Monitoring:  Monitoring Assessed Today: Yes      Taking Medications:  Diabetes Medication(s)       Sodium-Glucose Co-Transporter 2 (SGLT2) Inhibitors       empagliflozin (JARDIANCE) 10 MG TABS tablet Take 1 tablet (10 mg) by mouth daily       Sulfonylureas       glipiZIDE (GLUCOTROL XL) 5 MG 24 hr tablet Take 1 tablet (5 mg) by mouth daily            Taking Medication Assessed Today: Yes  Current Treatments: Diet, Oral Medication (taken by mouth)  Diabetes medication side effects?: No    Problem Solving:  Is the patient at risk for hypoglycemia?: Yes  Hypoglycemia Frequency: Never    Healthy Coping:  Stage of change: CONTEMPLATION (Considering change and yet undecided)  Patient Activation Measure Survey Score:       No data to display                Time Spent: 60 minutes  Encounter Type: Individual    Any diabetes medication dose changes were made via the CDE Protocol per the patient's referring provider. A copy of this encounter was shared with the provider.

## 2024-10-14 NOTE — LETTER
10/14/2024         RE: Oscar Mojica  [address]  [place] [address]        Dear Colleague,    Thank you for referring your patient, Oscar Mojica, to the [facility]. Please see a copy of my visit note below.    Diabetes Self-Management Education & Support    Presents for:      Type of Service: Telephone Visit      ASSESSMENT:    Initial diabetes education visit.   Previous diagnosis  Most recent A1C near optimal    Pt lives by himself.Has weekly PCA visits.   Self manages meds. Endorses occasional missed doses.   Denies any s/s of hypo or hyperglycemia    Shares that he has misplaced his glucose meter but he might still have the test strips and lancets - writer has mailed a sample meter (One Touch Verio Perfect) + test strips to patient, so he can start home monitoring his BG    Relies on [employer]OM[employer] meals and restaurant food. Pt wonders about what foods would help lower his cholesterol. We discussed high fiber-low fat diet and general diet guidelines. Also sent/emailed some patient education materials.    Likes to walk whenever he can. Also goes to adult day care 3x/wk      Education/Discussion: Reviewed diabetes basics, AIC and BS goals, sx and tx of hypo and hyperglycemia, complications of uncontrolled diabetes, general activity and diet guidelines, CHO foods, concept of budgeting and balancing, planning ahead for healthy meals, examples of quick and healthy meals/snacks, plate method for portion control, meter and testing basics: pt expressed understanding.    Discussed the need to eat healthily - mainly vegetables, some meat, limited rice/bread, noodles, exercise, drinking more water, sleep well       Patient's most recent   Lab Results   Component Value Date    A1C 7.0 05/15/2024     is not meeting goal of <7.0      PLAN    Continue with current therapy.  Start testing BG 1x/d and keep a BG log for review.  Eat 3 regular meals and not skip/delay - plan ahead and always keep  "some healthy snacks available     Topics to cover at upcoming visits: Healthy Eating, Monitoring, Taking Medication, Problem Solving, and Reducing Risks    Follow-up: pt states that he will call back later to schedule a follow up as/if needed  PCP:11/11  Instructed pt to bring his meter/BG log to all clinic visits     See Care Plan for co-developed, patient-state behavior change goals.  AVS provided for patient today.    Education Materials Provided:  Emailed with permission  Living Healthy with Diabetes, My Plate Planner, and CHO foods and serving sizes      SUBJECTIVE/OBJECTIVE:  Accompanied by: Self  Diabetes education in the past 24mo: No  Focus of Visit: Assistance w/ making life changes, Healthy Eating, Self-care behavioral goal setting  Disease course: Stable  Cultural Influences/Ethnic Background:  Not  or     Diabetes Symptoms & Complications:  Disease course: Stable       Patient Problem List and Family Medical History reviewed for relevant medical history, current medical status, and diabetes risk factors.    Vitals:  There were no vitals taken for this visit.  Estimated body mass index is 30.04 kg/m  as calculated from the following:    Height as of 9/30/24: 1.549 m (5' 1\").    Weight as of 9/30/24: 72.1 kg (159 lb).   Last 3 BP:   BP Readings from Last 3 Encounters:   09/30/24 (!) 156/92   09/12/24 (!) 173/78   05/15/24 (!) 146/83       History   Smoking Status    Every Day    Types: Cigarettes   Smokeless Tobacco    Never       Labs:  Lab Results   Component Value Date    A1C 7.0 05/15/2024     Lab Results   Component Value Date    GLC 80 09/30/2024     02/02/2021     Lab Results   Component Value Date    LDL 74 09/30/2024     Direct Measure HDL   Date Value Ref Range Status   09/30/2024 60 >=40 mg/dL Final   ]  GFR Estimate   Date Value Ref Range Status   09/30/2024 71 >60 mL/min/1.73m2 Final     Comment:     eGFR calculated using 2021 CKD-EPI equation.   02/02/2021 60 (L) >60 " "mL/min/1.73m2 Final     GFR Estimate If Black   Date Value Ref Range Status   02/02/2021 >60 >60 mL/min/1.73m2 Final     Lab Results   Component Value Date    CR 1.16 09/30/2024     No results found for: \"MICROALBUMIN\"    Healthy Eating:  Healthy Eating Assessed Today: Yes  Who cooks/prepares meals for you?: Other  Who purchases food in  your home?: Self, Other  How many times a week on average do you eat food made away from home (restaurant/take-out)?: 5+  Meals include: Breakfast, Dinner  Beverages: Water, Juice, Soda    Being Active:  Being Active Assessed Today: Yes  Exercise:: Currently not exercising    Monitoring:  Monitoring Assessed Today: Yes    ***    Taking Medications:  Diabetes Medication(s)       Sodium-Glucose Co-Transporter 2 (SGLT2) Inhibitors       empagliflozin (JARDIANCE) 10 MG TABS tablet Take 1 tablet (10 mg) by mouth daily       Sulfonylureas       glipiZIDE (GLUCOTROL XL) 5 MG 24 hr tablet Take 1 tablet (5 mg) by mouth daily            Taking Medication Assessed Today: Yes  Current Treatments: Diet, Oral Medication (taken by mouth)  Diabetes medication side effects?: No    Problem Solving:  Is the patient at risk for hypoglycemia?: Yes  Hypoglycemia Frequency: Never    Healthy Coping:  Stage of change: CONTEMPLATION (Considering change and yet undecided)  Patient Activation Measure Survey Score:       No data to display                Time Spent: {:641115} minutes  Encounter Type: Individual    Any diabetes medication dose changes were made via the CDE Protocol per the patient's {:[id number]}. A copy of this encounter was shared with the provider.     Diabetes Self-Management Education & Support    Presents for:      Type of Service: Telephone Visit      ASSESSMENT:    Initial diabetes education visit.   Previous diagnosis  Most recent A1C near optimal  Dyslipidemia  HTN    Pt lives by himself.Has weekly PCA visits.   Self manages meds. Endorses occasional missed doses.   Denies any s/s of hypo " or hyperglycemia    Shares that he has misplaced his glucose meter but he might still have the test strips and lancets - writer has mailed a sample meter (One Touch Verio Perfect) + test strips to patient, so he can start home monitoring his BG    Relies on [employer]OM[employer] meals and restaurant food. Pt wonders about what foods would help lower his cholesterol. We discussed high fiber-low fat diet and general diet guidelines. Also sent/emailed some patient education materials.    Likes to walk whenever he can. Also goes to adult day care 3x/wk      Education/Discussion: Reviewed diabetes basics, AIC and BS goals, sx and tx of hypo and hyperglycemia, complications of uncontrolled diabetes, general activity and diet guidelines, CHO foods, concept of budgeting and balancing, planning ahead for healthy meals, examples of quick and healthy meals/snacks, plate method for portion control, meter and testing basics: pt expressed understanding.    Discussed the need to eat healthily - mainly vegetables, some meat, limited rice/bread, noodles, exercise, drinking more water, sleep well       Patient's most recent   Lab Results   Component Value Date    A1C 7.0 05/15/2024     is not meeting goal of <7.0      PLAN    Continue with current therapy.  Start testing BG 1x/d and keep a BG log for review.  Eat 3 regular meals and not skip/delay - plan ahead and always keep some healthy snacks available     Topics to cover at upcoming visits: Healthy Eating, Monitoring, Taking Medication, Problem Solving, and Reducing Risks    Follow-up: pt states that he will call back later to schedule a follow up as/if needed  PCP:11/11  Instructed pt to bring his meter/BG log to all clinic visits     See Care Plan for co-developed, patient-state behavior change goals.  AVS provided for patient today.    Education Materials Provided:  Emailed with permission  Living Healthy with Diabetes, My Plate Planner, and CHO foods and serving  "sizes      SUBJECTIVE/OBJECTIVE:  Accompanied by: Self  Diabetes education in the past 24mo: No  Focus of Visit: Assistance w/ making life changes, Healthy Eating, Self-care behavioral goal setting  Disease course: Stable  Cultural Influences/Ethnic Background:  Not  or     Diabetes Symptoms & Complications:  Disease course: Stable       Patient Problem List and Family Medical History reviewed for relevant medical history, current medical status, and diabetes risk factors.    Vitals:  There were no vitals taken for this visit.  Estimated body mass index is 30.04 kg/m  as calculated from the following:    Height as of 9/30/24: 1.549 m (5' 1\").    Weight as of 9/30/24: 72.1 kg (159 lb).   Last 3 BP:   BP Readings from Last 3 Encounters:   09/30/24 (!) 156/92   09/12/24 (!) 173/78   05/15/24 (!) 146/83       History   Smoking Status    Every Day    Types: Cigarettes   Smokeless Tobacco    Never       Labs:  Lab Results   Component Value Date    A1C 7.0 05/15/2024     Lab Results   Component Value Date    GLC 80 09/30/2024     02/02/2021     Lab Results   Component Value Date    LDL 74 09/30/2024     Direct Measure HDL   Date Value Ref Range Status   09/30/2024 60 >=40 mg/dL Final   ]  GFR Estimate   Date Value Ref Range Status   09/30/2024 71 >60 mL/min/1.73m2 Final     Comment:     eGFR calculated using 2021 CKD-EPI equation.   02/02/2021 60 (L) >60 mL/min/1.73m2 Final     GFR Estimate If Black   Date Value Ref Range Status   02/02/2021 >60 >60 mL/min/1.73m2 Final     Lab Results   Component Value Date    CR 1.16 09/30/2024     No results found for: \"MICROALBUMIN\"    Healthy Eating:  Healthy Eating Assessed Today: Yes  Who cooks/prepares meals for you?: Other  Who purchases food in  your home?: Self, Other  How many times a week on average do you eat food made away from home (restaurant/take-out)?: 5+  Meals include: Breakfast, Dinner  Beverages: Water, Juice, Soda    Being Active:  Being Active " Assessed Today: Yes  Exercise:: Currently not exercising    Monitoring:  Monitoring Assessed Today: Yes      Taking Medications:  Diabetes Medication(s)       Sodium-Glucose Co-Transporter 2 (SGLT2) Inhibitors       empagliflozin (JARDIANCE) 10 MG TABS tablet Take 1 tablet (10 mg) by mouth daily       Sulfonylureas       glipiZIDE (GLUCOTROL XL) 5 MG 24 hr tablet Take 1 tablet (5 mg) by mouth daily            Taking Medication Assessed Today: Yes  Current Treatments: Diet, Oral Medication (taken by mouth)  Diabetes medication side effects?: No    Problem Solving:  Is the patient at risk for hypoglycemia?: Yes  Hypoglycemia Frequency: Never    Healthy Coping:  Stage of change: CONTEMPLATION (Considering change and yet undecided)  Patient Activation Measure Survey Score:       No data to display                Time Spent: 60 minutes  Encounter Type: Individual    Any diabetes medication dose changes were made via the CDE Protocol per the patient's referring provider. A copy of this encounter was shared with the provider.

## 2024-11-20 ENCOUNTER — TELEPHONE (OUTPATIENT)
Dept: FAMILY MEDICINE | Facility: CLINIC | Age: 64
End: 2024-11-20
Payer: COMMERCIAL

## 2024-11-20 DIAGNOSIS — E78.5 DYSLIPIDEMIA: ICD-10-CM

## 2024-11-20 DIAGNOSIS — I10 ESSENTIAL HYPERTENSION: ICD-10-CM

## 2024-11-20 DIAGNOSIS — I10 PRIMARY HYPERTENSION: ICD-10-CM

## 2024-11-20 DIAGNOSIS — E11.9 TYPE 2 DIABETES MELLITUS WITHOUT COMPLICATION, WITHOUT LONG-TERM CURRENT USE OF INSULIN (H): ICD-10-CM

## 2024-11-20 DIAGNOSIS — K21.9 GASTROESOPHAGEAL REFLUX DISEASE WITHOUT ESOPHAGITIS: ICD-10-CM

## 2024-11-20 DIAGNOSIS — F32.0 CURRENT MILD EPISODE OF MAJOR DEPRESSIVE DISORDER WITHOUT PRIOR EPISODE (H): ICD-10-CM

## 2024-11-20 NOTE — TELEPHONE ENCOUNTER
Test Results    Contacts       Contact Date/Time Type Contact Phone/Fax    11/20/2024 01:59 PM CST Phone (Incoming) Oscar Mojica (Self) [phone]            Who ordered the test:  Dr. Walker    Type of test: Lab and cholesterol, tirglycerides,     Date of test:  IN October    Where was the test performed:  [facility]    What are your questions/concerns?:  Would like more information on results    Okay to leave a detailed message?: Yes at Other phone number:  [phone]

## 2024-11-20 NOTE — TELEPHONE ENCOUNTER
General Call      Reason for Call:  Medications refills     What are your questions or concerns:      Patient is looking for refills on medications that are about to run out     Patient couldn't name all the ones he needs     -Patient has upcoming appt on 12/3/2024    Date of last appointment with provider: 9/30/2024    Okay to leave a detailed message?:     Call back  [phone]: Yes (Ira)     Oscar gave verbal for her to answer her own phone and give it to him

## 2024-11-21 RX ORDER — HYDROCHLOROTHIAZIDE 25 MG/1
25 TABLET ORAL DAILY
Qty: 90 TABLET | Refills: 0 | Status: SHIPPED | OUTPATIENT
Start: 2024-11-21

## 2024-11-21 RX ORDER — AMLODIPINE BESYLATE 5 MG/1
5 TABLET ORAL DAILY
Qty: 90 TABLET | Refills: 0 | Status: SHIPPED | OUTPATIENT
Start: 2024-11-21

## 2024-11-21 RX ORDER — GLIPIZIDE 5 MG/1
5 TABLET, FILM COATED, EXTENDED RELEASE ORAL DAILY
Qty: 30 TABLET | Refills: 0 | Status: SHIPPED | OUTPATIENT
Start: 2024-11-21

## 2024-11-21 RX ORDER — ATORVASTATIN CALCIUM 80 MG/1
80 TABLET, FILM COATED ORAL DAILY
Qty: 30 TABLET | Refills: 0 | Status: SHIPPED | OUTPATIENT
Start: 2024-11-21

## 2024-11-21 RX ORDER — LOSARTAN POTASSIUM 100 MG/1
100 TABLET ORAL DAILY
Qty: 90 TABLET | Refills: 0 | Status: SHIPPED | OUTPATIENT
Start: 2024-11-21

## 2024-11-21 RX ORDER — FENOFIBRATE 48 MG/1
48 TABLET, COATED ORAL DAILY
Qty: 30 TABLET | Refills: 0 | Status: SHIPPED | OUTPATIENT
Start: 2024-11-21

## 2024-11-21 NOTE — TELEPHONE ENCOUNTER
Attempted to contact patient, female answered voice and informed writer that he is out.  Asked that he return call to clinic and she will give him message.

## 2024-11-21 NOTE — TELEPHONE ENCOUNTER
Please give more information on labs from September. Results just state normal patient would like more information

## 2024-11-25 ENCOUNTER — TRANSFERRED RECORDS (OUTPATIENT)
Dept: HEALTH INFORMATION MANAGEMENT | Facility: CLINIC | Age: 64
End: 2024-11-25

## 2024-12-03 ENCOUNTER — OFFICE VISIT (OUTPATIENT)
Dept: FAMILY MEDICINE | Facility: CLINIC | Age: 64
End: 2024-12-03
Payer: COMMERCIAL

## 2024-12-03 VITALS
RESPIRATION RATE: 18 BRPM | HEART RATE: 93 BPM | BODY MASS INDEX: 29.87 KG/M2 | TEMPERATURE: 98.8 F | WEIGHT: 158.2 LBS | OXYGEN SATURATION: 96 % | SYSTOLIC BLOOD PRESSURE: 152 MMHG | HEIGHT: 61 IN | DIASTOLIC BLOOD PRESSURE: 78 MMHG

## 2024-12-03 DIAGNOSIS — E78.5 DYSLIPIDEMIA: ICD-10-CM

## 2024-12-03 DIAGNOSIS — F31.9 BIPOLAR 1 DISORDER (H): ICD-10-CM

## 2024-12-03 DIAGNOSIS — F10.21 ALCOHOL DEPENDENCE IN REMISSION (H): ICD-10-CM

## 2024-12-03 DIAGNOSIS — G89.29 CHRONIC PAIN OF LEFT KNEE: ICD-10-CM

## 2024-12-03 DIAGNOSIS — F11.20 METHADONE DEPENDENCE (H): ICD-10-CM

## 2024-12-03 DIAGNOSIS — M25.562 CHRONIC PAIN OF LEFT KNEE: ICD-10-CM

## 2024-12-03 DIAGNOSIS — M17.12 PRIMARY OSTEOARTHRITIS OF LEFT KNEE: ICD-10-CM

## 2024-12-03 DIAGNOSIS — I10 ESSENTIAL HYPERTENSION: Primary | ICD-10-CM

## 2024-12-03 DIAGNOSIS — E11.9 TYPE 2 DIABETES MELLITUS WITHOUT COMPLICATION, WITHOUT LONG-TERM CURRENT USE OF INSULIN (H): ICD-10-CM

## 2024-12-03 PROBLEM — I20.1 CORONARY ARTERY VASOSPASM (H): Status: RESOLVED | Noted: 2017-07-29 | Resolved: 2024-12-03

## 2024-12-03 PROBLEM — E66.01 MORBID OBESITY (H): Status: RESOLVED | Noted: 2019-09-18 | Resolved: 2024-12-03

## 2024-12-03 PROBLEM — B18.2 HEP C W/O COMA, CHRONIC (H): Status: RESOLVED | Noted: 2021-03-04 | Resolved: 2024-12-03

## 2024-12-03 PROCEDURE — G2211 COMPLEX E/M VISIT ADD ON: HCPCS | Performed by: FAMILY MEDICINE

## 2024-12-03 PROCEDURE — 99214 OFFICE O/P EST MOD 30 MIN: CPT | Performed by: FAMILY MEDICINE

## 2024-12-03 RX ORDER — HYDROCHLOROTHIAZIDE 25 MG/1
25 TABLET ORAL DAILY
Qty: 90 TABLET | Refills: 0 | Status: SHIPPED | OUTPATIENT
Start: 2024-12-03

## 2024-12-03 RX ORDER — LOSARTAN POTASSIUM 100 MG/1
100 TABLET ORAL DAILY
Qty: 90 TABLET | Refills: 0 | Status: SHIPPED | OUTPATIENT
Start: 2024-12-03

## 2024-12-03 RX ORDER — GLIPIZIDE 5 MG/1
5 TABLET, FILM COATED, EXTENDED RELEASE ORAL DAILY
Qty: 30 TABLET | Refills: 0 | Status: SHIPPED | OUTPATIENT
Start: 2024-12-03

## 2024-12-03 RX ORDER — AMLODIPINE BESYLATE 10 MG/1
10 TABLET ORAL DAILY
Qty: 30 TABLET | Refills: 0 | Status: SHIPPED | OUTPATIENT
Start: 2024-12-03

## 2024-12-03 NOTE — ASSESSMENT & PLAN NOTE
On Jardiance 10 mg daily,  glipizide XL 5 mg daily  Well-tolerated    Check labs in 3 weeks     Keep average BG < 150

## 2024-12-03 NOTE — ASSESSMENT & PLAN NOTE
On losartan, amlodipine and hydrochlorothiazide, with no intolerance    Elevated blood pressure     Plan:  Start amlodipine 10 mg daily  Continue losartan 100 mg, hydrochlorothiazide 25 mg daily    Follow-up to reassess in 1 month

## 2024-12-03 NOTE — ASSESSMENT & PLAN NOTE
On atorvastatin 80 mg and fenofibrate 48 mg daily  Well-tolerated    Satisfactory lipids  Continue current management

## 2024-12-03 NOTE — PROGRESS NOTES
"  Assessment & Plan   Problem List Items Addressed This Visit       Alcohol dependence in remission (H)    Bipolar 1 disorder (H)    Essential hypertension - Primary     On losartan, amlodipine and hydrochlorothiazide, with no intolerance    Elevated blood pressure     Plan:  Start amlodipine 10 mg daily  Continue losartan 100 mg, hydrochlorothiazide 25 mg daily    Follow-up to reassess in 1 month         Relevant Medications    amLODIPine (NORVASC) 10 MG tablet    hydrochlorothiazide (HYDRODIURIL) 25 MG tablet    losartan (COZAAR) 100 MG tablet    Dyslipidemia     On atorvastatin 80 mg and fenofibrate 48 mg daily  Well-tolerated    Satisfactory lipids  Continue current management             Methadone dependence (H)    Type 2 diabetes mellitus without complication, without long-term current use of insulin (H)     On Jardiance 10 mg daily,  glipizide XL 5 mg daily  Well-tolerated    Check labs in 3 weeks     Keep average BG < 150                Relevant Medications    empagliflozin (JARDIANCE) 10 MG TABS tablet    glipiZIDE (GLUCOTROL XL) 5 MG 24 hr tablet    Other Relevant Orders    Hemoglobin A1c    Chronic pain of left knee    Relevant Medications    diclofenac (VOLTAREN) 1 % topical gel    Primary osteoarthritis of left knee    Relevant Medications    diclofenac (VOLTAREN) 1 % topical gel             BMI  Estimated body mass index is 29.89 kg/m  as calculated from the following:    Height as of this encounter: 1.549 m (5' 1\").    Weight as of this encounter: 71.8 kg (158 lb 3.2 oz).   Weight management plan: Discussed healthy diet and exercise guidelines    The longitudinal plan of care for the diagnosis(es)/condition(s) as documented were addressed during this visit. Due to the added complexity in care, I will continue to support Oscar in the subsequent management and with ongoing continuity of care.      Subjective   Oscar is a 64 year old, presenting for the following health issues:  No chief complaint on " "file.        12/3/2024     1:24 PM   Additional Questions   Roomed by Cely RUIZ CMA     History of Present Illness       Diabetes:   He presents for follow up of diabetes.    He is not checking blood glucose.        He is concerned about other.    He is not experiencing numbness or burning in feet, excessive thirst, blurry vision, weight changes or redness, sores or blisters on feet. The patient has not had a diabetic eye exam in the last 12 months.          Hypertension: He presents for follow up of hypertension.  He does not check blood pressure  regularly outside of the clinic. Outside blood pressures have been over 140/90. He does not follow a low salt diet.                       Objective    BP (!) 152/78   Pulse 93   Temp 98.8  F (37.1  C) (Oral)   Resp 18   Ht 1.549 m (5' 1\")   Wt 71.8 kg (158 lb 3.2 oz)   SpO2 96%   BMI 29.89 kg/m    Body mass index is 29.89 kg/m .    Physical Exam               Signed Electronically by: Cameron Walker MD    "

## 2024-12-20 DIAGNOSIS — R52 PAIN: ICD-10-CM

## 2024-12-20 NOTE — TELEPHONE ENCOUNTER
Medication Question or Refill        What medication are you calling about (include dose and sig)?:    Disp Refills Start End DAVID   meloxicam (MOBIC) 15 MG tablet 60 tablet 3 11/16/2023 -- No   Sig - Route: Take 1 tablet (15 mg) by mouth daily as needed for moderate pain TAKE 1 TABLET(15 MG) BY MOUTH DAILY - Oral   Sent to pharmacy as: Meloxicam 15 MG Oral Tablet (MOBIC)   Class: E-Prescribe   Order: [id number]   E-Prescribing Status: Receipt confirmed by pharmacy (11/16/2023  3:00 PM CST)       Preferred Pharmacy  [id number] - [place], [place] - [address] AT [facility] [address]  [address]  [place] [address]  Phone: [phone] Fax: [phone]      Controlled Substance Agreement on file:   CSA -- Patient Level:    CSA: None found at the patient level.       Who prescribed the medication?: PCP    Do you need a refill? Yes    When did you use the medication last? 11/16/2024

## 2024-12-23 RX ORDER — MELOXICAM 15 MG/1
15 TABLET ORAL DAILY PRN
Qty: 60 TABLET | Refills: 3 | Status: SHIPPED | OUTPATIENT
Start: 2024-12-23

## 2024-12-26 ENCOUNTER — LAB (OUTPATIENT)
Dept: LAB | Facility: CLINIC | Age: 64
End: 2024-12-26
Payer: COMMERCIAL

## 2024-12-26 DIAGNOSIS — E11.9 TYPE 2 DIABETES MELLITUS WITHOUT COMPLICATION, WITHOUT LONG-TERM CURRENT USE OF INSULIN (H): ICD-10-CM

## 2024-12-26 LAB
EST. AVERAGE GLUCOSE BLD GHB EST-MCNC: 128 MG/DL
HBA1C MFR BLD: 6.1 % (ref 0–5.6)

## 2025-01-14 ENCOUNTER — TELEPHONE (OUTPATIENT)
Dept: FAMILY MEDICINE | Facility: CLINIC | Age: 65
End: 2025-01-14
Payer: COMMERCIAL

## 2025-01-14 NOTE — TELEPHONE ENCOUNTER
Pt calling stating he has an STI, and he is requesting an appointment with PCP. Appointment scheduled 1/20.     ANKITA Pichardo.

## 2025-01-20 ENCOUNTER — OFFICE VISIT (OUTPATIENT)
Dept: FAMILY MEDICINE | Facility: CLINIC | Age: 65
End: 2025-01-20
Payer: COMMERCIAL

## 2025-01-20 VITALS
HEART RATE: 78 BPM | WEIGHT: 161.1 LBS | HEIGHT: 61 IN | RESPIRATION RATE: 15 BRPM | TEMPERATURE: 98.1 F | DIASTOLIC BLOOD PRESSURE: 74 MMHG | SYSTOLIC BLOOD PRESSURE: 136 MMHG | OXYGEN SATURATION: 99 % | BODY MASS INDEX: 30.41 KG/M2

## 2025-01-20 DIAGNOSIS — Z11.4 SCREENING FOR HIV (HUMAN IMMUNODEFICIENCY VIRUS): ICD-10-CM

## 2025-01-20 DIAGNOSIS — Z11.59 NEED FOR HEPATITIS C SCREENING TEST: ICD-10-CM

## 2025-01-20 DIAGNOSIS — Z86.19 HISTORY OF HEPATITIS C VIRUS INFECTION: ICD-10-CM

## 2025-01-20 DIAGNOSIS — Z11.3 SCREEN FOR STD (SEXUALLY TRANSMITTED DISEASE): ICD-10-CM

## 2025-01-20 DIAGNOSIS — R36.9 PENILE DISCHARGE: Primary | ICD-10-CM

## 2025-01-20 LAB
HBV CORE AB SERPL QL IA: REACTIVE
HBV CORE IGM SERPL QL IA: NONREACTIVE
HBV SURFACE AB SERPL IA-ACNC: 5.79 M[IU]/ML
HBV SURFACE AB SERPL IA-ACNC: NONREACTIVE M[IU]/ML
HBV SURFACE AG SERPL QL IA: NONREACTIVE
HCV AB SERPL QL IA: REACTIVE
HCV AB SERPL QL IA: REACTIVE
HIV 1+2 AB+HIV1 P24 AG SERPL QL IA: NONREACTIVE

## 2025-01-20 PROCEDURE — 87591 N.GONORRHOEAE DNA AMP PROB: CPT | Performed by: FAMILY MEDICINE

## 2025-01-20 PROCEDURE — 86803 HEPATITIS C AB TEST: CPT | Mod: 59 | Performed by: FAMILY MEDICINE

## 2025-01-20 PROCEDURE — 86803 HEPATITIS C AB TEST: CPT | Performed by: FAMILY MEDICINE

## 2025-01-20 PROCEDURE — 96372 THER/PROPH/DIAG INJ SC/IM: CPT | Performed by: FAMILY MEDICINE

## 2025-01-20 PROCEDURE — 87340 HEPATITIS B SURFACE AG IA: CPT | Performed by: FAMILY MEDICINE

## 2025-01-20 PROCEDURE — 86704 HEP B CORE ANTIBODY TOTAL: CPT | Performed by: FAMILY MEDICINE

## 2025-01-20 PROCEDURE — 86780 TREPONEMA PALLIDUM: CPT | Performed by: FAMILY MEDICINE

## 2025-01-20 PROCEDURE — 86696 HERPES SIMPLEX TYPE 2 TEST: CPT | Performed by: FAMILY MEDICINE

## 2025-01-20 PROCEDURE — 87522 HEPATITIS C REVRS TRNSCRPJ: CPT | Performed by: FAMILY MEDICINE

## 2025-01-20 PROCEDURE — 87389 HIV-1 AG W/HIV-1&-2 AB AG IA: CPT | Performed by: FAMILY MEDICINE

## 2025-01-20 PROCEDURE — 86695 HERPES SIMPLEX TYPE 1 TEST: CPT | Performed by: FAMILY MEDICINE

## 2025-01-20 PROCEDURE — 86705 HEP B CORE ANTIBODY IGM: CPT | Performed by: FAMILY MEDICINE

## 2025-01-20 PROCEDURE — 99214 OFFICE O/P EST MOD 30 MIN: CPT | Mod: 25 | Performed by: FAMILY MEDICINE

## 2025-01-20 PROCEDURE — 86706 HEP B SURFACE ANTIBODY: CPT | Performed by: FAMILY MEDICINE

## 2025-01-20 PROCEDURE — 36415 COLL VENOUS BLD VENIPUNCTURE: CPT | Performed by: FAMILY MEDICINE

## 2025-01-20 PROCEDURE — 87491 CHLMYD TRACH DNA AMP PROBE: CPT | Performed by: FAMILY MEDICINE

## 2025-01-20 RX ORDER — CEFTRIAXONE SODIUM 1 G
500 VIAL (EA) INJECTION ONCE
Status: COMPLETED | OUTPATIENT
Start: 2025-01-20 | End: 2025-01-20

## 2025-01-20 RX ADMIN — Medication 500 MG: at 15:10

## 2025-01-20 NOTE — PROGRESS NOTES
"  Assessment & Plan     Penile discharge  - Chlamydia trachomatis/Neisseria gonorrhoeae by PCR    Positive for GC.   Practice safe sex. Partner need to follow up for treatment.   - cefTRIAXone (ROCEPHIN) in lidocaine 1% for IM administration only 500 mg    Anticipatory follow up       Screen for STD (sexually transmitted disease)  - Hepatitis C antibody; Future  - HIV Antigen Antibody Combo Cascade; Future  - Herpes Simplex Virus 1 and 2 IgG; Future  - Treponema Abs w Reflex to RPR and Titer; Future  - Hepatitis B Surface Antibody; Future  - Hepatitis B surface antigen; Future  - Hepatitis B core antibody; Future    H/o Hep C infection.      Positive for hepatitis B core antibody, which is indicative of way to recovery from and acute hepatitis B infection.      Recheck lab in 3-6 months to ensure complete recovery.        Need for hepatitis C screening test  - Hepatitis C Screen Reflex to HCV RNA Quant and Genotype; Future  Lab:   Confirms past Hep C infection.      Screening for HIV (human immunodeficiency virus)  Negative         The longitudinal plan of care for the diagnosis(es)/condition(s) as documented were addressed during this visit. Due to the added complexity in care, I will continue to support Oscar in the subsequent management and with ongoing continuity of care.    Subjective   Oscar is a 64 year old, presenting for evaluation of an STD, reporting penile discharge following unprotected sex on 12/24.     Additionally, the patient requests a form to be signed        1/20/2025     2:45 PM   Additional Questions   Roomed by Reyna     STD    History of Present Illness       Reason for visit:  STD exposure  Symptom onset:  More than a month  Symptoms include:  Discharge from penis  Symptom intensity:  Moderate  Had these symptoms before:  No   He is taking medications regularly.                     Objective    /74   Pulse 78   Temp 98.1  F (36.7  C) (Oral)   Resp 15   Ht 1.549 m (5' 1\")   Wt 73.1 " kg (161 lb 1.6 oz)   SpO2 99%   BMI 30.44 kg/m    Body mass index is 30.44 kg/m .    Physical Exam               Signed Electronically by: Cameron Walker MD

## 2025-01-20 NOTE — LETTER
January 22, 2025      Oscar Mojica  [address]  [place] [address]        Dear ,    We are writing to inform you of your test results.    Positive for GC.  Appropriate antibiotic already given during the last OV   Practice safe sex.   Anticipatory follow up   ositive for hepatitis B core antibody, which is indicative of way to recovery from and acute hepatitis B infection.     Recheck lab in 3-6 months to ensure complete recovery. Will need to schedule this appointment with Dr. Walker     H/o Hep C infection.     Negative Titer    Resulted Orders   Chlamydia trachomatis/Neisseria gonorrhoeae by PCR   Result Value Ref Range    Chlamydia Trachomatis Negative Negative      Comment:      Negative for C. trachomatis rRNA by transcription mediated amplification.   A negative result by transcription mediated amplification does not preclude the presence of infection because results are dependent on proper and adequate collection, absence of inhibitors and sufficient rRNA to be detected.    Neisseria gonorrhoeae Positive (A) Negative      Comment:      Positive for N. gonorrhoeae rRNA by transcription mediated amplification. As is true for all non-culture methods, a positive specimen obtained from a patient after therapeutic treatment cannot be interpreted as indicating the presence of viable N. gonorrhoeae.    CTNG Specimen Source Urine, Voided    Hepatitis C Screen Reflex to HCV RNA Quant and Genotype   Result Value Ref Range    Hepatitis C Antibody Reactive (A) Nonreactive      Comment:      The detection of anti-HCV antibodies indicates a current HCV infection or past infection that has resolved, or false HCV antibody positivity.     The CDC recommends that a reactive result should be followed by Nucleic acid testing for HCV RNA. If HCV RNA is detected, that indicates current HCV infection.    If HCV RNA is not detected, that indicates either past, resolved HCV infection, or false HCV  antibody positivity.   Hepatitis C antibody   Result Value Ref Range    Hepatitis C Antibody Reactive (A) Nonreactive      Comment:      The detection of anti-HCV antibodies indicates a current HCV infection or past infection that has resolved, or false HCV antibody positivity.     The CDC recommends that a reactive result should be followed by Nucleic acid testing for HCV RNA. If HCV RNA is detected, that indicates current HCV infection.    If HCV RNA is not detected, that indicates either past, resolved HCV infection, or false HCV antibody positivity.   HIV Antigen Antibody Combo Cascade   Result Value Ref Range    HIV Antigen Antibody Combo Nonreactive Nonreactive      Comment:      Negative HIV-1 p24 antigen and HIV-1/2 antibody screening test results usually indicate the absence of HIV-1 and HIV-2 infection. However, such negative results do not rule-out acute HIV infection.  If acute HIV-1 or HIV-2 infection is suspected, detection of HIV-1 or HIV-2 RNA  is recommended. This result is obtained using the Roche Elecsys HIV Duo method on the elliot e801 immunoassay analyzer.   Herpes Simplex Virus 1 and 2 IgG   Result Value Ref Range    HSV Type 1 IgG Instrument Value 47.50 (H) <0.90 Index    Herpes Simplex Virus Type 1 IgG Antibody Positive.  IgG antibody to HSV-1 detected. (A) No HSV-1 IgG antibodies detected    HSV Type 2 IgG Instrument Value 0.34 <0.90 Index    Herpes Simplex Virus Type 2 IgG Antibody No HSV-2 IgG antibodies detected. No HSV-2 IgG antibodies detected   Treponema Abs w Reflex to RPR and Titer   Result Value Ref Range    Treponema Antibody Total Nonreactive Nonreactive   Hepatitis B Surface Antibody   Result Value Ref Range    Hepatitis B Surface Antibody Nonreactive       Comment:      Nonreactive results, defined as anti-HBs levels of less than 8.5 mIU/mL, indicate a lack of recovery from acute or chronic hepatitis B or inadequate immune response to HBV vaccination.    Hepatitis B Surface  Antibody Instrument Value 5.79 <8.5 m[IU]/mL   Hepatitis B surface antigen   Result Value Ref Range    Hepatitis B Surface Antigen Nonreactive Nonreactive   Hepatitis B core antibody   Result Value Ref Range    Hepatitis B Core Antibody Total Reactive (A) Nonreactive      Comment:      A reactive result indicates acute, chronic or past/resolved hepatitis B infection.   Hepatitis C RNA, Quantitative by PCR with Confirmatory Reflex to Genotyping   Result Value Ref Range    Hepatitis C RNA IU/mL Not Detected Not Detected IU/mL    Narrative    The elliot  Hepatitis C assay is an FDA-approved in vitro nucleic acid amplification test for the quantification of Hepatitis C virus (HCV) RNA in human EDTA plasma or serum, using the Roche elliot  instrument system for automated viral nucleic acid extraction, purification, amplification, and detection of the viral nucleic acid target. This assay utilizes dual probes to detect and quantify, but not discriminate genotypes 1-6. The test is intended for use as an aid in the diagnosis of HCV infection and an aid in the management of HCV-infected patients undergoing anti-viral therapy. Titer results are reported in IU/mL.   Hepatitis B core antibody IgM   Result Value Ref Range    Hepatitis B Core Antibody IgM Nonreactive Nonreactive      Comment:      A nonreactive result suggests lack of recent exposure to the virus in the preceding 6 months.       If you have any questions or concerns, please call the clinic at the number listed above.       Sincerely,      Cameron Walker MD    Electronically signed

## 2025-01-21 LAB
C TRACH DNA SPEC QL PROBE+SIG AMP: NEGATIVE
HCV RNA SERPL NAA+PROBE-ACNC: NOT DETECTED IU/ML
HSV1 IGG SERPL QL IA: 47.5 INDEX
HSV1 IGG SERPL QL IA: ABNORMAL
HSV2 IGG SERPL QL IA: 0.34 INDEX
HSV2 IGG SERPL QL IA: ABNORMAL
N GONORRHOEA DNA SPEC QL NAA+PROBE: POSITIVE
SPECIMEN TYPE: ABNORMAL
T PALLIDUM AB SER QL: NONREACTIVE

## 2025-01-22 PROBLEM — Z86.19 HISTORY OF HEPATITIS C VIRUS INFECTION: Status: ACTIVE | Noted: 2025-01-22

## 2025-01-23 DIAGNOSIS — I10 ESSENTIAL HYPERTENSION: ICD-10-CM

## 2025-01-27 RX ORDER — AMLODIPINE BESYLATE 10 MG/1
10 TABLET ORAL DAILY
Qty: 90 TABLET | Refills: 3 | Status: SHIPPED | OUTPATIENT
Start: 2025-01-27

## 2025-02-16 DIAGNOSIS — E11.9 TYPE 2 DIABETES MELLITUS WITHOUT COMPLICATION, WITHOUT LONG-TERM CURRENT USE OF INSULIN (H): ICD-10-CM

## 2025-02-17 RX ORDER — GLIPIZIDE 5 MG/1
5 TABLET, FILM COATED, EXTENDED RELEASE ORAL DAILY
Qty: 90 TABLET | Refills: 0 | Status: SHIPPED | OUTPATIENT
Start: 2025-02-17

## 2025-03-19 DIAGNOSIS — E78.5 DYSLIPIDEMIA: ICD-10-CM

## 2025-03-19 RX ORDER — ATORVASTATIN CALCIUM 80 MG/1
80 TABLET, FILM COATED ORAL DAILY
Qty: 90 TABLET | Refills: 1 | Status: SHIPPED | OUTPATIENT
Start: 2025-03-19

## 2025-03-31 DIAGNOSIS — E11.9 TYPE 2 DIABETES MELLITUS WITHOUT COMPLICATION, WITHOUT LONG-TERM CURRENT USE OF INSULIN (H): ICD-10-CM

## 2025-03-31 NOTE — TELEPHONE ENCOUNTER
Reason for Call:  Medication refill:  Bridge Fill - until 04/08/25 appointment    Do you use a [facility]? NO  Name of the pharmacy and phone number for the current request:      [id number] - [place], [place] - [address] [place] [facility] [address]       Name of the medication requested: empagliflozin (JARDIANCE) 10 MG TABS tablet     Other : Caller introduces self as  to help patient get to his appointments and refills etc.  Scheduled appt for patient with PCP 04/08/25 requests to update phone to his number - relayed that would need to be authorized with patient and discussed at appointment.     Call taken on 3/31/2025 at 9:20 AM by Kaya Calderon

## 2025-04-10 ENCOUNTER — HOSPITAL ENCOUNTER (OUTPATIENT)
Dept: ULTRASOUND IMAGING | Facility: HOSPITAL | Age: 65
Discharge: HOME | End: 2025-04-10
Attending: FAMILY MEDICINE
Payer: COMMERCIAL

## 2025-04-10 ENCOUNTER — OFFICE VISIT (OUTPATIENT)
Dept: FAMILY MEDICINE | Facility: CLINIC | Age: 65
End: 2025-04-10
Payer: COMMERCIAL

## 2025-04-10 VITALS
RESPIRATION RATE: 16 BRPM | HEIGHT: 61 IN | DIASTOLIC BLOOD PRESSURE: 70 MMHG | TEMPERATURE: 98.3 F | BODY MASS INDEX: 30.17 KG/M2 | SYSTOLIC BLOOD PRESSURE: 142 MMHG | OXYGEN SATURATION: 98 % | HEART RATE: 79 BPM | WEIGHT: 159.8 LBS

## 2025-04-10 DIAGNOSIS — I10 ESSENTIAL HYPERTENSION: ICD-10-CM

## 2025-04-10 DIAGNOSIS — N50.811 RIGHT TESTICULAR PAIN: ICD-10-CM

## 2025-04-10 DIAGNOSIS — E11.9 TYPE 2 DIABETES MELLITUS WITHOUT COMPLICATION, WITHOUT LONG-TERM CURRENT USE OF INSULIN (H): ICD-10-CM

## 2025-04-10 DIAGNOSIS — N50.811 RIGHT TESTICULAR PAIN: Primary | ICD-10-CM

## 2025-04-10 DIAGNOSIS — R79.89 LOW VITAMIN D LEVEL: ICD-10-CM

## 2025-04-10 LAB
ALBUMIN SERPL BCG-MCNC: 4.2 G/DL (ref 3.5–5.2)
ALP SERPL-CCNC: 67 U/L (ref 40–150)
ALT SERPL W P-5'-P-CCNC: 15 U/L (ref 0–70)
ANION GAP SERPL CALCULATED.3IONS-SCNC: 9 MMOL/L (ref 7–15)
AST SERPL W P-5'-P-CCNC: 24 U/L (ref 0–45)
BILIRUB SERPL-MCNC: 0.2 MG/DL
BUN SERPL-MCNC: 17.3 MG/DL (ref 8–23)
CALCIUM SERPL-MCNC: 9.4 MG/DL (ref 8.8–10.4)
CHLORIDE SERPL-SCNC: 104 MMOL/L (ref 98–107)
CHOLEST SERPL-MCNC: 159 MG/DL
CREAT SERPL-MCNC: 0.84 MG/DL (ref 0.67–1.17)
EGFRCR SERPLBLD CKD-EPI 2021: >90 ML/MIN/1.73M2
EST. AVERAGE GLUCOSE BLD GHB EST-MCNC: 126 MG/DL
FASTING STATUS PATIENT QL REPORTED: ABNORMAL
FASTING STATUS PATIENT QL REPORTED: NORMAL
GLUCOSE SERPL-MCNC: 127 MG/DL (ref 70–99)
HBA1C MFR BLD: 6 % (ref 0–5.6)
HCO3 SERPL-SCNC: 28 MMOL/L (ref 22–29)
HDLC SERPL-MCNC: 52 MG/DL
LDLC SERPL CALC-MCNC: 84 MG/DL
NONHDLC SERPL-MCNC: 107 MG/DL
POTASSIUM SERPL-SCNC: 4.2 MMOL/L (ref 3.4–5.3)
PROT SERPL-MCNC: 7 G/DL (ref 6.4–8.3)
SODIUM SERPL-SCNC: 141 MMOL/L (ref 135–145)
TRIGL SERPL-MCNC: 117 MG/DL
VIT D+METAB SERPL-MCNC: 32 NG/ML (ref 20–50)

## 2025-04-10 PROCEDURE — 36415 COLL VENOUS BLD VENIPUNCTURE: CPT | Performed by: FAMILY MEDICINE

## 2025-04-10 PROCEDURE — 83036 HEMOGLOBIN GLYCOSYLATED A1C: CPT | Performed by: FAMILY MEDICINE

## 2025-04-10 PROCEDURE — 80061 LIPID PANEL: CPT | Performed by: FAMILY MEDICINE

## 2025-04-10 PROCEDURE — 80053 COMPREHEN METABOLIC PANEL: CPT | Performed by: FAMILY MEDICINE

## 2025-04-10 PROCEDURE — 3078F DIAST BP <80 MM HG: CPT | Performed by: FAMILY MEDICINE

## 2025-04-10 PROCEDURE — 82306 VITAMIN D 25 HYDROXY: CPT | Performed by: FAMILY MEDICINE

## 2025-04-10 PROCEDURE — 99214 OFFICE O/P EST MOD 30 MIN: CPT | Performed by: FAMILY MEDICINE

## 2025-04-10 PROCEDURE — 76870 US EXAM SCROTUM: CPT

## 2025-04-10 PROCEDURE — 3077F SYST BP >= 140 MM HG: CPT | Performed by: FAMILY MEDICINE

## 2025-04-10 PROCEDURE — G2211 COMPLEX E/M VISIT ADD ON: HCPCS | Performed by: FAMILY MEDICINE

## 2025-04-10 PROCEDURE — 93976 VASCULAR STUDY: CPT

## 2025-04-10 ASSESSMENT — ANXIETY QUESTIONNAIRES: GAD7 TOTAL SCORE: INCOMPLETE

## 2025-04-10 ASSESSMENT — PATIENT HEALTH QUESTIONNAIRE - PHQ9
10. IF YOU CHECKED OFF ANY PROBLEMS, HOW DIFFICULT HAVE THESE PROBLEMS MADE IT FOR YOU TO DO YOUR WORK, TAKE CARE OF THINGS AT HOME, OR GET ALONG WITH OTHER PEOPLE: NOT DIFFICULT AT ALL
SUM OF ALL RESPONSES TO PHQ QUESTIONS 1-9: 13
SUM OF ALL RESPONSES TO PHQ QUESTIONS 1-9: 13

## 2025-04-10 NOTE — PROGRESS NOTES
1. Right testicular pain    2. Low vitamin D level    3. Type 2 diabetes mellitus without complication, without long-term current use of insulin (H)    4. Essential hypertension       Orders Placed This Encounter   Procedures    US Testicular & Scrotum w Doppler Ltd    Hemoglobin A1c    Comprehensive metabolic panel    Lipid panel reflex to direct LDL Fasting    Vitamin D Deficiency        Testicular Pain:   testicular and scrotal ultrasound which showed   1.  Bilateral varicoceles.  2.  Normal testicles.  Reassurance given  Close follow-up if not better as anticipated      Vitamin D:   Normal level vitamin D  Recommend daily supplementation (sent to preferred pharmacy)     Labs for Diabetes and Hypertension:   Elevated blood glucose in the context of diabetes and satisfactory A1c  Satisfactory lipids  Continue current management     No need for additional potassium supplementation going forward      The longitudinal plan of care for the diagnosis(es)/condition(s) as documented were addressed during this visit. Due to the added complexity in care, I will continue to support Oscar in the subsequent management and with ongoing continuity of care.    Subjective   Oscar is a 64-year-old male presenting for a refill request for Vitamin D3 and Potassium Chloride. He reports having right testicular pain for the past few days, with no penile discharge. He has a history of low vitamin D levels and would like to have his current levels checked. Additionally, he is following up on his diabetes and hypertension. He has been on prescribed potassium tablets but was advised to stop, and has not been taking them.        4/10/2025    11:48 AM   Additional Questions   Roomed by Jose J ORDAZ MA     History of Present Illness       Back Pain:  He presents for follow up of back pain. Patient's back pain is a chronic problem.  Location of back pain:  Right middle of back, right shoulder, left shoulder, right buttock, left buttock, right side  of waist and left side of waist  Description of back pain: sharp and stabbing  Back pain spreads: right buttocks, left buttocks, right knee, left knee, right shoulder and left shoulder    Since patient first noticed back pain, pain is: always present, but gets better and worse  Does back pain interfere with his job:  Not applicable       CKD: He uses over the counter pain medication, including excedrid, a few times a month.    Mental Health Follow-up:  Patient presents to follow-up on Depression & Anxiety.Patient's depression since last visit has been:  Medium  The patient is having other symptoms associated with depression.  Patient's anxiety since last visit has been:  Medium  The patient is having other symptoms associated with anxiety.  Any significant life events: No  Patient is not feeling anxious or having panic attacks.  Patient has concerns about alcohol or drug use.    Diabetes:   He presents for follow up of diabetes.    He is not checking blood glucose.        He is concerned about other.   He is having blurry vision.  The patient has not had a diabetic eye exam in the last 12 months.          Hyperlipidemia:  He presents for follow up of hyperlipidemia.   He is taking medication to lower cholesterol. He is not having myalgia or other side effects to statin medications.    Hypertension: He presents for follow up of hypertension.  He does not check blood pressure  regularly outside of the clinic. Outside blood pressures have been over 140/90. He does not follow a low salt diet.     Reason for visit:  Kidney diabetes    He eats 2-3 servings of fruits and vegetables daily.He consumes 5 sweetened beverage(s) daily.He exercises with enough effort to increase his heart rate 10 to 19 minutes per day.  He exercises with enough effort to increase his heart rate 4 days per week. He is missing 2 dose(s) of medications per week.  He is not taking prescribed medications regularly due to remembering to take.     "                  Objective    BP (!) 142/70   Pulse 79   Temp 98.3  F (36.8  C) (Oral)   Resp 16   Ht 1.549 m (5' 1\")   Wt 72.5 kg (159 lb 12.8 oz)   SpO2 98%   BMI 30.19 kg/m    Body mass index is 30.19 kg/m .    Physical Exam   Testicular exam:  Normal to inspection with no discharge or swelling  Palpable tenderness to the right testicle  No regional adenopathy            Signed Electronically by: Cameron Walker MD    "

## 2025-04-15 ENCOUNTER — PATIENT OUTREACH (OUTPATIENT)
Dept: CARE COORDINATION | Facility: CLINIC | Age: 65
End: 2025-04-15
Payer: COMMERCIAL

## 2025-04-16 RX ORDER — CHOLECALCIFEROL (VITAMIN D3) 50 MCG
1 TABLET ORAL DAILY
Qty: 90 TABLET | Refills: 3 | Status: SHIPPED | OUTPATIENT
Start: 2025-04-16

## 2025-04-29 ENCOUNTER — PATIENT OUTREACH (OUTPATIENT)
Dept: CARE COORDINATION | Facility: CLINIC | Age: 65
End: 2025-04-29
Payer: COMMERCIAL

## 2025-06-08 DIAGNOSIS — E78.5 DYSLIPIDEMIA: ICD-10-CM

## 2025-06-09 RX ORDER — FENOFIBRATE 48 MG/1
48 TABLET, FILM COATED ORAL DAILY
Qty: 90 TABLET | Refills: 0 | Status: SHIPPED | OUTPATIENT
Start: 2025-06-09

## 2025-08-30 ENCOUNTER — HOSPITAL ENCOUNTER (OUTPATIENT)
Facility: HOSPITAL | Age: 65
Setting detail: OBSERVATION
Discharge: HOME | End: 2025-08-31
Attending: STUDENT IN AN ORGANIZED HEALTH CARE EDUCATION/TRAINING PROGRAM | Admitting: STUDENT IN AN ORGANIZED HEALTH CARE EDUCATION/TRAINING PROGRAM
Payer: COMMERCIAL

## 2025-08-30 DIAGNOSIS — F11.90 OPIOID USE: Primary | ICD-10-CM

## 2025-08-30 DIAGNOSIS — M62.81 GENERALIZED MUSCLE WEAKNESS: ICD-10-CM

## 2025-08-30 DIAGNOSIS — R41.82 ALTERED MENTAL STATUS, UNSPECIFIED ALTERED MENTAL STATUS TYPE: ICD-10-CM

## 2025-08-30 LAB
ALBUMIN SERPL BCG-MCNC: 4.5 G/DL (ref 3.5–5.2)
ALP SERPL-CCNC: 74 U/L (ref 40–150)
ALT SERPL W P-5'-P-CCNC: 16 U/L (ref 0–70)
ANION GAP SERPL CALCULATED.3IONS-SCNC: 10 MMOL/L (ref 7–15)
APAP SERPL-MCNC: <5 UG/ML (ref 10–30)
AST SERPL W P-5'-P-CCNC: 23 U/L (ref 0–45)
BASE EXCESS BLDV CALC-SCNC: 3.8 MMOL/L (ref -3–3)
BASOPHILS # BLD AUTO: 0.05 10E3/UL (ref 0–0.2)
BASOPHILS NFR BLD AUTO: 0.9 %
BILIRUB SERPL-MCNC: 0.7 MG/DL
BUN SERPL-MCNC: 16.4 MG/DL (ref 8–23)
CALCIUM SERPL-MCNC: 9.6 MG/DL (ref 8.8–10.4)
CHLORIDE SERPL-SCNC: 98 MMOL/L (ref 98–107)
CREAT SERPL-MCNC: 1.07 MG/DL (ref 0.67–1.17)
EGFRCR SERPLBLD CKD-EPI 2021: 77 ML/MIN/1.73M2
EOSINOPHIL # BLD AUTO: 0.22 10E3/UL (ref 0–0.7)
EOSINOPHIL NFR BLD AUTO: 4 %
ERYTHROCYTE [DISTWIDTH] IN BLOOD BY AUTOMATED COUNT: 13 % (ref 10–15)
ETHANOL SERPL-MCNC: <0.01 G/DL
GLUCOSE SERPL-MCNC: 87 MG/DL (ref 70–99)
HCO3 BLDV-SCNC: 31 MMOL/L (ref 21–28)
HCO3 SERPL-SCNC: 30 MMOL/L (ref 22–29)
HCT VFR BLD AUTO: 43.1 % (ref 40–53)
HGB BLD-MCNC: 13.7 G/DL (ref 13.3–17.7)
HOLD SPECIMEN: NORMAL
IMM GRANULOCYTES # BLD: <0.03 10E3/UL
IMM GRANULOCYTES NFR BLD: 0.2 %
LYMPHOCYTES # BLD AUTO: 1.7 10E3/UL (ref 0.8–5.3)
LYMPHOCYTES NFR BLD AUTO: 31 %
MCH RBC QN AUTO: 25.5 PG (ref 26.5–33)
MCHC RBC AUTO-ENTMCNC: 31.8 G/DL (ref 31.5–36.5)
MCV RBC AUTO: 80.1 FL (ref 78–100)
MONOCYTES # BLD AUTO: 0.47 10E3/UL (ref 0–1.3)
MONOCYTES NFR BLD AUTO: 8.6 %
NEUTROPHILS # BLD AUTO: 3.03 10E3/UL (ref 1.6–8.3)
NEUTROPHILS NFR BLD AUTO: 55.3 %
NRBC # BLD AUTO: <0.03 10E3/UL
NRBC BLD AUTO-RTO: 0 /100
O2/TOTAL GAS SETTING VFR VENT: 21 %
OXYHGB MFR BLDV: 80 % (ref 70–75)
PCO2 BLDV: 54 MM HG (ref 40–50)
PH BLDV: 7.36 [PH] (ref 7.32–7.43)
PLATELET # BLD AUTO: 272 10E3/UL (ref 150–450)
PO2 BLDV: 48 MM HG (ref 25–47)
POTASSIUM SERPL-SCNC: 4.2 MMOL/L (ref 3.4–5.3)
PROT SERPL-MCNC: 7.6 G/DL (ref 6.4–8.3)
RBC # BLD AUTO: 5.38 10E6/UL (ref 4.4–5.9)
SALICYLATES SERPL-MCNC: <0.3 MG/DL (ref ?–30)
SAO2 % BLDV: 81.9 % (ref 70–75)
SODIUM SERPL-SCNC: 138 MMOL/L (ref 135–145)
WBC # BLD AUTO: 5.48 10E3/UL (ref 4–11)

## 2025-08-30 PROCEDURE — 85025 COMPLETE CBC W/AUTO DIFF WBC: CPT | Performed by: STUDENT IN AN ORGANIZED HEALTH CARE EDUCATION/TRAINING PROGRAM

## 2025-08-30 PROCEDURE — 80143 DRUG ASSAY ACETAMINOPHEN: CPT | Performed by: STUDENT IN AN ORGANIZED HEALTH CARE EDUCATION/TRAINING PROGRAM

## 2025-08-30 PROCEDURE — 93005 ELECTROCARDIOGRAM TRACING: CPT | Performed by: STUDENT IN AN ORGANIZED HEALTH CARE EDUCATION/TRAINING PROGRAM

## 2025-08-30 PROCEDURE — 80053 COMPREHEN METABOLIC PANEL: CPT | Performed by: STUDENT IN AN ORGANIZED HEALTH CARE EDUCATION/TRAINING PROGRAM

## 2025-08-30 PROCEDURE — 250N000011 HC RX IP 250 OP 636: Performed by: STUDENT IN AN ORGANIZED HEALTH CARE EDUCATION/TRAINING PROGRAM

## 2025-08-30 PROCEDURE — 82805 BLOOD GASES W/O2 SATURATION: CPT

## 2025-08-30 PROCEDURE — 250N000011 HC RX IP 250 OP 636

## 2025-08-30 PROCEDURE — 99285 EMERGENCY DEPT VISIT HI MDM: CPT | Mod: 25 | Performed by: STUDENT IN AN ORGANIZED HEALTH CARE EDUCATION/TRAINING PROGRAM

## 2025-08-30 PROCEDURE — 36415 COLL VENOUS BLD VENIPUNCTURE: CPT

## 2025-08-30 PROCEDURE — 80179 DRUG ASSAY SALICYLATE: CPT | Performed by: STUDENT IN AN ORGANIZED HEALTH CARE EDUCATION/TRAINING PROGRAM

## 2025-08-30 PROCEDURE — 96374 THER/PROPH/DIAG INJ IV PUSH: CPT

## 2025-08-30 PROCEDURE — 82077 ASSAY SPEC XCP UR&BREATH IA: CPT | Performed by: STUDENT IN AN ORGANIZED HEALTH CARE EDUCATION/TRAINING PROGRAM

## 2025-08-30 PROCEDURE — 36415 COLL VENOUS BLD VENIPUNCTURE: CPT | Performed by: STUDENT IN AN ORGANIZED HEALTH CARE EDUCATION/TRAINING PROGRAM

## 2025-08-30 RX ORDER — NALOXONE HYDROCHLORIDE 1 MG/ML
1 INJECTION INTRAMUSCULAR; INTRAVENOUS; SUBCUTANEOUS ONCE
Status: COMPLETED | OUTPATIENT
Start: 2025-08-30 | End: 2025-08-30

## 2025-08-30 RX ORDER — NALOXONE HYDROCHLORIDE 0.4 MG/ML
INJECTION, SOLUTION INTRAMUSCULAR; INTRAVENOUS; SUBCUTANEOUS
Status: COMPLETED
Start: 2025-08-30 | End: 2025-08-30

## 2025-08-30 RX ADMIN — NALOXONE HYDROCHLORIDE 0.4 MG: 0.4 INJECTION, SOLUTION INTRAMUSCULAR; INTRAVENOUS; SUBCUTANEOUS at 18:01

## 2025-08-30 RX ADMIN — NALOXONE HYDROCHLORIDE 1 MG: 1 INJECTION PARENTERAL at 21:18

## 2025-08-30 ASSESSMENT — ACTIVITIES OF DAILY LIVING (ADL)
ADLS_ACUITY_SCORE: 41

## 2025-08-31 ENCOUNTER — APPOINTMENT (OUTPATIENT)
Dept: CT IMAGING | Facility: HOSPITAL | Age: 65
End: 2025-08-31
Attending: INTERNAL MEDICINE
Payer: COMMERCIAL

## 2025-08-31 ENCOUNTER — APPOINTMENT (OUTPATIENT)
Dept: PHYSICAL THERAPY | Facility: HOSPITAL | Age: 65
End: 2025-08-31
Attending: INTERNAL MEDICINE
Payer: COMMERCIAL

## 2025-08-31 VITALS
OXYGEN SATURATION: 68 % | HEART RATE: 74 BPM | RESPIRATION RATE: 19 BRPM | TEMPERATURE: 98.7 F | HEIGHT: 62 IN | WEIGHT: 220 LBS | BODY MASS INDEX: 40.48 KG/M2 | SYSTOLIC BLOOD PRESSURE: 163 MMHG | DIASTOLIC BLOOD PRESSURE: 93 MMHG

## 2025-08-31 PROBLEM — F11.90 OPIOID USE: Status: ACTIVE | Noted: 2025-08-31

## 2025-08-31 PROBLEM — R41.82 AMS (ALTERED MENTAL STATUS): Status: ACTIVE | Noted: 2025-08-31

## 2025-08-31 PROBLEM — F10.21 ALCOHOL DEPENDENCE IN REMISSION (H): Status: ACTIVE | Noted: 2017-07-29

## 2025-08-31 LAB
ANION GAP SERPL CALCULATED.3IONS-SCNC: 13 MMOL/L (ref 7–15)
BUN SERPL-MCNC: 14 MG/DL (ref 8–23)
CALCIUM SERPL-MCNC: 9.3 MG/DL (ref 8.8–10.4)
CHLORIDE SERPL-SCNC: 100 MMOL/L (ref 98–107)
CREAT SERPL-MCNC: 0.85 MG/DL (ref 0.67–1.17)
EGFRCR SERPLBLD CKD-EPI 2021: >90 ML/MIN/1.73M2
ERYTHROCYTE [DISTWIDTH] IN BLOOD BY AUTOMATED COUNT: 13.1 % (ref 10–15)
GLUCOSE BLDC GLUCOMTR-MCNC: 101 MG/DL (ref 70–99)
GLUCOSE BLDC GLUCOMTR-MCNC: 127 MG/DL (ref 70–99)
GLUCOSE BLDC GLUCOMTR-MCNC: 136 MG/DL (ref 70–99)
GLUCOSE BLDC GLUCOMTR-MCNC: 165 MG/DL (ref 70–99)
GLUCOSE BLDC GLUCOMTR-MCNC: 55 MG/DL (ref 70–99)
GLUCOSE BLDC GLUCOMTR-MCNC: 59 MG/DL (ref 70–99)
GLUCOSE BLDC GLUCOMTR-MCNC: 78 MG/DL (ref 70–99)
GLUCOSE SERPL-MCNC: 71 MG/DL (ref 70–99)
HCO3 SERPL-SCNC: 22 MMOL/L (ref 22–29)
HCT VFR BLD AUTO: 40.2 % (ref 40–53)
HGB BLD-MCNC: 13.3 G/DL (ref 13.3–17.7)
MAGNESIUM SERPL-MCNC: 1.8 MG/DL (ref 1.7–2.3)
MCH RBC QN AUTO: 26.1 PG (ref 26.5–33)
MCHC RBC AUTO-ENTMCNC: 33.1 G/DL (ref 31.5–36.5)
MCV RBC AUTO: 79 FL (ref 78–100)
PHOSPHATE SERPL-MCNC: 3.6 MG/DL (ref 2.5–4.5)
PLATELET # BLD AUTO: 235 10E3/UL (ref 150–450)
POTASSIUM SERPL-SCNC: 3.8 MMOL/L (ref 3.4–5.3)
RBC # BLD AUTO: 5.09 10E6/UL (ref 4.4–5.9)
SODIUM SERPL-SCNC: 135 MMOL/L (ref 135–145)
WBC # BLD AUTO: 5.8 10E3/UL (ref 4–11)

## 2025-08-31 PROCEDURE — G0378 HOSPITAL OBSERVATION PER HR: HCPCS

## 2025-08-31 PROCEDURE — 99223 1ST HOSP IP/OBS HIGH 75: CPT | Performed by: HOSPITALIST

## 2025-08-31 PROCEDURE — 96361 HYDRATE IV INFUSION ADD-ON: CPT

## 2025-08-31 PROCEDURE — 83735 ASSAY OF MAGNESIUM: CPT | Performed by: INTERNAL MEDICINE

## 2025-08-31 PROCEDURE — 70450 CT HEAD/BRAIN W/O DYE: CPT

## 2025-08-31 PROCEDURE — 36415 COLL VENOUS BLD VENIPUNCTURE: CPT | Performed by: HOSPITALIST

## 2025-08-31 PROCEDURE — 82374 ASSAY BLOOD CARBON DIOXIDE: CPT | Performed by: HOSPITALIST

## 2025-08-31 PROCEDURE — 82962 GLUCOSE BLOOD TEST: CPT

## 2025-08-31 PROCEDURE — 84100 ASSAY OF PHOSPHORUS: CPT | Performed by: INTERNAL MEDICINE

## 2025-08-31 PROCEDURE — 96372 THER/PROPH/DIAG INJ SC/IM: CPT | Performed by: HOSPITALIST

## 2025-08-31 PROCEDURE — 250N000011 HC RX IP 250 OP 636: Performed by: HOSPITALIST

## 2025-08-31 PROCEDURE — 250N000013 HC RX MED GY IP 250 OP 250 PS 637: Performed by: HOSPITALIST

## 2025-08-31 PROCEDURE — 85014 HEMATOCRIT: CPT | Performed by: HOSPITALIST

## 2025-08-31 PROCEDURE — 258N000003 HC RX IP 258 OP 636: Performed by: HOSPITALIST

## 2025-08-31 PROCEDURE — 97161 PT EVAL LOW COMPLEX 20 MIN: CPT | Mod: GP

## 2025-08-31 RX ORDER — SODIUM CHLORIDE 9 MG/ML
INJECTION, SOLUTION INTRAVENOUS CONTINUOUS
Status: DISCONTINUED | OUTPATIENT
Start: 2025-08-31 | End: 2025-08-31

## 2025-08-31 RX ORDER — AMOXICILLIN 250 MG
1 CAPSULE ORAL 2 TIMES DAILY PRN
Status: DISCONTINUED | OUTPATIENT
Start: 2025-08-31 | End: 2025-08-31 | Stop reason: HOSPADM

## 2025-08-31 RX ORDER — ONDANSETRON 4 MG/1
4 TABLET, ORALLY DISINTEGRATING ORAL EVERY 6 HOURS PRN
Status: DISCONTINUED | OUTPATIENT
Start: 2025-08-31 | End: 2025-08-31 | Stop reason: HOSPADM

## 2025-08-31 RX ORDER — ACETAMINOPHEN 650 MG/1
650 SUPPOSITORY RECTAL EVERY 4 HOURS PRN
Status: DISCONTINUED | OUTPATIENT
Start: 2025-08-31 | End: 2025-08-31 | Stop reason: HOSPADM

## 2025-08-31 RX ORDER — DEXTROSE MONOHYDRATE 25 G/50ML
25-50 INJECTION, SOLUTION INTRAVENOUS
Status: DISCONTINUED | OUTPATIENT
Start: 2025-08-31 | End: 2025-08-31 | Stop reason: HOSPADM

## 2025-08-31 RX ORDER — AMOXICILLIN 250 MG
2 CAPSULE ORAL 2 TIMES DAILY PRN
Status: DISCONTINUED | OUTPATIENT
Start: 2025-08-31 | End: 2025-08-31 | Stop reason: HOSPADM

## 2025-08-31 RX ORDER — NICOTINE POLACRILEX 4 MG
15-30 LOZENGE BUCCAL
Status: DISCONTINUED | OUTPATIENT
Start: 2025-08-31 | End: 2025-08-31 | Stop reason: HOSPADM

## 2025-08-31 RX ORDER — ACETAMINOPHEN 325 MG/1
650 TABLET ORAL EVERY 4 HOURS PRN
Status: DISCONTINUED | OUTPATIENT
Start: 2025-08-31 | End: 2025-08-31 | Stop reason: HOSPADM

## 2025-08-31 RX ORDER — OMEPRAZOLE 20 MG/1
20 CAPSULE, DELAYED RELEASE ORAL DAILY
COMMUNITY

## 2025-08-31 RX ORDER — ONDANSETRON 2 MG/ML
4 INJECTION INTRAMUSCULAR; INTRAVENOUS EVERY 6 HOURS PRN
Status: DISCONTINUED | OUTPATIENT
Start: 2025-08-31 | End: 2025-08-31 | Stop reason: HOSPADM

## 2025-08-31 RX ORDER — ENOXAPARIN SODIUM 100 MG/ML
40 INJECTION SUBCUTANEOUS EVERY 24 HOURS
Status: DISCONTINUED | OUTPATIENT
Start: 2025-08-31 | End: 2025-08-31

## 2025-08-31 RX ORDER — PROCHLORPERAZINE MALEATE 10 MG
10 TABLET ORAL EVERY 6 HOURS PRN
Status: DISCONTINUED | OUTPATIENT
Start: 2025-08-31 | End: 2025-08-31 | Stop reason: HOSPADM

## 2025-08-31 RX ORDER — NALOXONE HYDROCHLORIDE 1 MG/ML
1 INJECTION INTRAMUSCULAR; INTRAVENOUS; SUBCUTANEOUS
Status: DISCONTINUED | OUTPATIENT
Start: 2025-08-31 | End: 2025-08-31 | Stop reason: HOSPADM

## 2025-08-31 RX ORDER — HYDRALAZINE HYDROCHLORIDE 20 MG/ML
10 INJECTION INTRAMUSCULAR; INTRAVENOUS EVERY 6 HOURS PRN
Status: DISCONTINUED | OUTPATIENT
Start: 2025-08-31 | End: 2025-08-31 | Stop reason: HOSPADM

## 2025-08-31 RX ORDER — ENOXAPARIN SODIUM 100 MG/ML
40 INJECTION SUBCUTANEOUS EVERY 12 HOURS
Status: DISCONTINUED | OUTPATIENT
Start: 2025-08-31 | End: 2025-08-31

## 2025-08-31 RX ADMIN — SODIUM CHLORIDE 100 ML/HR: 0.9 INJECTION, SOLUTION INTRAVENOUS at 01:21

## 2025-08-31 RX ADMIN — SODIUM CHLORIDE: 0.9 INJECTION, SOLUTION INTRAVENOUS at 10:23

## 2025-08-31 RX ADMIN — ENOXAPARIN SODIUM 40 MG: 40 INJECTION SUBCUTANEOUS at 02:08

## 2025-08-31 RX ADMIN — DEXTROSE 15 G: 15 GEL ORAL at 08:50

## 2025-08-31 ASSESSMENT — ACTIVITIES OF DAILY LIVING (ADL)
ADLS_ACUITY_SCORE: 34
ADLS_ACUITY_SCORE: 41
ADLS_ACUITY_SCORE: 27
ADLS_ACUITY_SCORE: 41
ADLS_ACUITY_SCORE: 34
ADLS_ACUITY_SCORE: 41
ADLS_ACUITY_SCORE: 34
ADLS_ACUITY_SCORE: 27
ADLS_ACUITY_SCORE: 41
DEPENDENT_IADLS:: TRANSPORTATION
ADLS_ACUITY_SCORE: 34
ADLS_ACUITY_SCORE: 34
ADLS_ACUITY_SCORE: 41

## 2025-09-02 ENCOUNTER — PATIENT OUTREACH (OUTPATIENT)
Dept: CARE COORDINATION | Facility: CLINIC | Age: 65
End: 2025-09-02
Payer: COMMERCIAL

## 2025-09-02 LAB
ATRIAL RATE - MUSE: 79 BPM
DIASTOLIC BLOOD PRESSURE - MUSE: 81 MMHG
INTERPRETATION ECG - MUSE: NORMAL
P AXIS - MUSE: 71 DEGREES
PR INTERVAL - MUSE: 166 MS
QRS DURATION - MUSE: 96 MS
QT - MUSE: 426 MS
QTC - MUSE: 488 MS
R AXIS - MUSE: 77 DEGREES
SYSTOLIC BLOOD PRESSURE - MUSE: 171 MMHG
T AXIS - MUSE: 51 DEGREES
VENTRICULAR RATE- MUSE: 79 BPM